# Patient Record
Sex: MALE | Race: WHITE | Employment: OTHER | URBAN - METROPOLITAN AREA
[De-identification: names, ages, dates, MRNs, and addresses within clinical notes are randomized per-mention and may not be internally consistent; named-entity substitution may affect disease eponyms.]

---

## 2017-04-14 RX ORDER — ALPRAZOLAM 0.5 MG/1
0.5 TABLET ORAL DAILY PRN
Qty: 90 TABLET | Refills: 0 | Status: SHIPPED | OUTPATIENT
Start: 2017-04-14 | End: 2017-04-19 | Stop reason: SDUPTHER

## 2017-04-19 RX ORDER — ALPRAZOLAM 0.5 MG/1
0.5 TABLET ORAL DAILY PRN
Qty: 90 TABLET | Refills: 0 | Status: SHIPPED | OUTPATIENT
Start: 2017-04-19 | End: 2017-07-11 | Stop reason: SDUPTHER

## 2017-07-11 RX ORDER — ALPRAZOLAM 0.5 MG/1
0.5 TABLET ORAL DAILY PRN
Qty: 90 TABLET | Refills: 0 | Status: SHIPPED | OUTPATIENT
Start: 2017-07-11 | End: 2017-10-13 | Stop reason: SDUPTHER

## 2017-07-12 ENCOUNTER — NURSE ONLY (OUTPATIENT)
Dept: INTERNAL MEDICINE | Age: 67
End: 2017-07-12

## 2017-07-12 DIAGNOSIS — Z79.899 ENCOUNTER FOR LONG-TERM CURRENT USE OF MEDICATION: Primary | ICD-10-CM

## 2017-07-12 LAB
AMPHETAMINE SCREEN, URINE: NORMAL
BARBITURATE SCREEN URINE: NORMAL
BENZODIAZEPINE SCREEN, URINE: NORMAL
CANNABINOID SCREEN URINE: NORMAL
COCAINE METABOLITE SCREEN URINE: NORMAL
Lab: NORMAL
OPIATE SCREEN URINE: NORMAL
PHENCYCLIDINE SCREEN URINE: NORMAL

## 2017-10-13 ENCOUNTER — OFFICE VISIT (OUTPATIENT)
Dept: INTERNAL MEDICINE | Age: 67
End: 2017-10-13

## 2017-10-13 VITALS
HEIGHT: 66 IN | RESPIRATION RATE: 16 BRPM | OXYGEN SATURATION: 98 % | SYSTOLIC BLOOD PRESSURE: 126 MMHG | HEART RATE: 68 BPM | WEIGHT: 202 LBS | TEMPERATURE: 97.9 F | DIASTOLIC BLOOD PRESSURE: 72 MMHG | BODY MASS INDEX: 32.47 KG/M2

## 2017-10-13 DIAGNOSIS — F41.9 ANXIETY: ICD-10-CM

## 2017-10-13 DIAGNOSIS — F51.04 PSYCHOPHYSIOLOGICAL INSOMNIA: ICD-10-CM

## 2017-10-13 DIAGNOSIS — Z23 NEED FOR INFLUENZA VACCINATION: Primary | ICD-10-CM

## 2017-10-13 PROCEDURE — 99213 OFFICE O/P EST LOW 20 MIN: CPT | Performed by: PHYSICIAN ASSISTANT

## 2017-10-13 PROCEDURE — 90662 IIV NO PRSV INCREASED AG IM: CPT | Performed by: PHYSICIAN ASSISTANT

## 2017-10-13 PROCEDURE — 90471 IMMUNIZATION ADMIN: CPT | Performed by: PHYSICIAN ASSISTANT

## 2017-10-13 RX ORDER — ALPRAZOLAM 0.5 MG/1
0.5 TABLET ORAL DAILY PRN
Qty: 83 TABLET | Refills: 0 | Status: SHIPPED | OUTPATIENT
Start: 2017-10-13 | End: 2018-01-09 | Stop reason: SDUPTHER

## 2017-10-13 RX ORDER — ALPRAZOLAM 0.5 MG/1
0.5 TABLET ORAL DAILY PRN
Qty: 7 TABLET | Refills: 0 | Status: SHIPPED | OUTPATIENT
Start: 2017-10-13 | End: 2018-04-09

## 2017-10-13 ASSESSMENT — ENCOUNTER SYMPTOMS
ABDOMINAL PAIN: 0
NAUSEA: 0
DOUBLE VISION: 0
VOMITING: 0
SHORTNESS OF BREATH: 0
DIARRHEA: 0
EYE PAIN: 0
BACK PAIN: 0
CONSTIPATION: 0
SORE THROAT: 0
COUGH: 0

## 2017-10-13 ASSESSMENT — PATIENT HEALTH QUESTIONNAIRE - PHQ9
1. LITTLE INTEREST OR PLEASURE IN DOING THINGS: 0
SUM OF ALL RESPONSES TO PHQ9 QUESTIONS 1 & 2: 0
SUM OF ALL RESPONSES TO PHQ QUESTIONS 1-9: 0
2. FEELING DOWN, DEPRESSED OR HOPELESS: 0

## 2017-10-13 NOTE — PROGRESS NOTES
Years of education: N/A     Occupational History    Not on file. Social History Main Topics    Smoking status: Former Smoker     Packs/day: 0.50     Years: 3.00     Types: Cigarettes     Quit date: 1/6/2012    Smokeless tobacco: Never Used    Alcohol use Yes      Comment: OCC.  Drug use: Unknown    Sexual activity: Not Currently     Other Topics Concern    Not on file     Social History Narrative    No narrative on file     Family History   Problem Relation Age of Onset    Coronary Art Dis Mother     Heart Failure Mother     Heart Disease Mother     Heart Surgery Sister     Cancer Sister      breast    Heart Disease Sister     Heart Disease Father     Heart Disease Brother      No Known Allergies  Current Outpatient Prescriptions   Medication Sig Dispense Refill    ALPRAZolam (XANAX) 0.5 MG tablet Take 1 tablet by mouth daily as needed for Anxiety 83 tablet 0    ALPRAZolam (XANAX) 0.5 MG tablet Take 1 tablet by mouth daily as needed for Anxiety 7 tablet 0    carvedilol (COREG) 25 MG tablet Take 1 tablet by mouth 2 times daily 90 tablet 0    lisinopril (PRINIVIL;ZESTRIL) 20 MG tablet Take 1 tablet by mouth 2 times daily. 180 tablet 3    atorvastatin (LIPITOR) 80 MG tablet Take 1 tablet by mouth daily. 90 tablet 3    aspirin 81 MG EC tablet Take 81 mg by mouth daily. No current facility-administered medications for this visit. Objective    Vitals:    10/13/17 1527   BP: 126/72   Site: Left Arm   Position: Sitting   Cuff Size: Large Adult   Pulse: 68   Resp: 16   Temp: 97.9 °F (36.6 °C)   TempSrc: Oral   SpO2: 98%   Weight: 202 lb (91.6 kg)   Height: 5' 6\" (1.676 m)     Physical Exam   Constitutional: He is oriented to person, place, and time and well-developed, well-nourished, and in no distress. HENT:   Head: Normocephalic and atraumatic.    Right Ear: External ear normal.   Left Ear: External ear normal.   Nose: Nose normal.   Mouth/Throat: Oropharynx is clear and moist. Eyes: Conjunctivae and EOM are normal. Pupils are equal, round, and reactive to light. Neck: Normal range of motion. Neck supple. Cardiovascular: Normal rate, regular rhythm and normal heart sounds. Pulmonary/Chest: Effort normal and breath sounds normal.   Neurological: He is alert and oriented to person, place, and time. Skin: Skin is warm. Psychiatric: Affect normal.            Assessment & Plan   1. Need for influenza vaccination  INFLUENZA, HIGH DOSE, 65 YRS +, IM, PF, PREFILL SYR, 0.5ML (FLUZONE HD)   2. Anxiety     3. Psychophysiological insomnia           Orders Placed This Encounter   Procedures    INFLUENZA, HIGH DOSE, 65 YRS +, IM, PF, PREFILL SYR, 0.5ML (FLUZONE HD)     Orders Placed This Encounter   Medications    ALPRAZolam (XANAX) 0.5 MG tablet     Sig: Take 1 tablet by mouth daily as needed for Anxiety     Dispense:  83 tablet     Refill:  0    ALPRAZolam (XANAX) 0.5 MG tablet     Sig: Take 1 tablet by mouth daily as needed for Anxiety     Dispense:  7 tablet     Refill:  0     Medications Discontinued During This Encounter   Medication Reason    triamcinolone (KENALOG) 0.025 % cream Therapy completed    ALPRAZolam (XANAX) 0.5 MG tablet Reorder     Return in about 3 months (around 1/13/2018), or if symptoms worsen or fail to improve, for follow up with your PCP as directed.   He should again informed of risk of dependency with this medication  Patient does not show sign of dependency  Desiree Nielsen PA-C

## 2018-01-10 RX ORDER — ALPRAZOLAM 0.5 MG/1
0.5 TABLET ORAL DAILY PRN
Qty: 90 TABLET | Refills: 0 | Status: SHIPPED | OUTPATIENT
Start: 2018-01-10 | End: 2018-04-09 | Stop reason: SDUPTHER

## 2018-01-12 RX ORDER — ALPRAZOLAM 0.5 MG/1
0.5 TABLET ORAL DAILY PRN
Qty: 7 TABLET | Refills: 0 | Status: CANCELLED | OUTPATIENT
Start: 2018-01-12

## 2018-04-09 DIAGNOSIS — F41.9 ANXIETY: Primary | ICD-10-CM

## 2018-04-09 RX ORDER — ALPRAZOLAM 0.5 MG/1
0.5 TABLET ORAL DAILY PRN
Qty: 90 TABLET | Refills: 0 | Status: SHIPPED | OUTPATIENT
Start: 2018-04-09 | End: 2018-07-06 | Stop reason: SDUPTHER

## 2018-05-02 ENCOUNTER — TELEPHONE (OUTPATIENT)
Dept: INTERNAL MEDICINE CLINIC | Age: 68
End: 2018-05-02

## 2018-05-03 ENCOUNTER — TELEPHONE (OUTPATIENT)
Dept: INTERNAL MEDICINE CLINIC | Age: 68
End: 2018-05-03

## 2018-07-06 DIAGNOSIS — F41.9 ANXIETY: ICD-10-CM

## 2018-07-06 RX ORDER — ALPRAZOLAM 0.5 MG/1
0.5 TABLET ORAL DAILY PRN
Qty: 30 TABLET | Refills: 0 | Status: SHIPPED | OUTPATIENT
Start: 2018-07-06 | End: 2018-07-31 | Stop reason: SDUPTHER

## 2018-07-06 NOTE — TELEPHONE ENCOUNTER
Pt requesting medication refill. Rx requested:  Requested Prescriptions     Pending Prescriptions Disp Refills    ALPRAZolam (XANAX) 0.5 MG tablet 90 tablet 0     Sig: Take 1 tablet by mouth daily as needed for Anxiety for up to 90 days. .       Last Office Visit:   Visit date not found    Last Tox screen:  7.12.17    Last Medication contract:    8.1.17    Next Visit Date:  Future Appointments  Date Time Provider Cirilo Lozano   7/31/2018 4:15 PM MD Law Engle 39

## 2018-07-31 ENCOUNTER — OFFICE VISIT (OUTPATIENT)
Dept: INTERNAL MEDICINE CLINIC | Age: 68
End: 2018-07-31
Payer: COMMERCIAL

## 2018-07-31 VITALS
TEMPERATURE: 97.6 F | DIASTOLIC BLOOD PRESSURE: 90 MMHG | HEIGHT: 67 IN | SYSTOLIC BLOOD PRESSURE: 154 MMHG | WEIGHT: 213 LBS | HEART RATE: 70 BPM | RESPIRATION RATE: 16 BRPM | BODY MASS INDEX: 33.43 KG/M2 | OXYGEN SATURATION: 98 %

## 2018-07-31 DIAGNOSIS — Z23 NEED FOR SHINGLES VACCINE: ICD-10-CM

## 2018-07-31 DIAGNOSIS — Z23 NEED FOR VACCINATION WITH 13-POLYVALENT PNEUMOCOCCAL CONJUGATE VACCINE: ICD-10-CM

## 2018-07-31 DIAGNOSIS — G56.01 RIGHT CARPAL TUNNEL SYNDROME: ICD-10-CM

## 2018-07-31 DIAGNOSIS — Z12.5 SCREENING FOR PROSTATE CANCER: ICD-10-CM

## 2018-07-31 DIAGNOSIS — Z79.899 ENCOUNTER FOR LONG-TERM CURRENT USE OF MEDICATION: ICD-10-CM

## 2018-07-31 DIAGNOSIS — F41.9 ANXIETY: ICD-10-CM

## 2018-07-31 DIAGNOSIS — Z23 NEED FOR TDAP VACCINATION: ICD-10-CM

## 2018-07-31 DIAGNOSIS — I10 ESSENTIAL HYPERTENSION: Primary | ICD-10-CM

## 2018-07-31 DIAGNOSIS — I83.93 VARICOSE VEINS OF BOTH LOWER EXTREMITIES: ICD-10-CM

## 2018-07-31 PROCEDURE — 90715 TDAP VACCINE 7 YRS/> IM: CPT | Performed by: FAMILY MEDICINE

## 2018-07-31 PROCEDURE — 99214 OFFICE O/P EST MOD 30 MIN: CPT | Performed by: FAMILY MEDICINE

## 2018-07-31 PROCEDURE — 90472 IMMUNIZATION ADMIN EACH ADD: CPT | Performed by: FAMILY MEDICINE

## 2018-07-31 PROCEDURE — 90670 PCV13 VACCINE IM: CPT | Performed by: FAMILY MEDICINE

## 2018-07-31 PROCEDURE — 90471 IMMUNIZATION ADMIN: CPT | Performed by: FAMILY MEDICINE

## 2018-07-31 RX ORDER — ALPRAZOLAM 0.5 MG/1
0.5 TABLET ORAL DAILY PRN
Qty: 90 TABLET | Refills: 1 | Status: SHIPPED | OUTPATIENT
Start: 2018-07-31 | End: 2019-01-28

## 2018-07-31 RX ORDER — ALPRAZOLAM 0.5 MG/1
0.5 TABLET ORAL DAILY PRN
Qty: 30 TABLET | Refills: 0 | Status: SHIPPED | OUTPATIENT
Start: 2018-07-31 | End: 2018-07-31 | Stop reason: SDUPTHER

## 2018-07-31 ASSESSMENT — PATIENT HEALTH QUESTIONNAIRE - PHQ9
2. FEELING DOWN, DEPRESSED OR HOPELESS: 0
SUM OF ALL RESPONSES TO PHQ QUESTIONS 1-9: 0
SUM OF ALL RESPONSES TO PHQ9 QUESTIONS 1 & 2: 0
1. LITTLE INTEREST OR PLEASURE IN DOING THINGS: 0

## 2018-07-31 NOTE — PATIENT INSTRUCTIONS
sugar-sweetened drinks like soda. The Mediterranean diet may also include red wine with your meal-1 glass each day for women and up to 2 glasses a day for men. Tips for eating at home  · Use herbs, spices, garlic, lemon zest, and citrus juice instead of salt to add flavor to foods. · Add avocado slices to your sandwich instead of wilde. · Have fish for lunch or dinner instead of red meat. Brush the fish with olive oil, and broil or grill it. · Sprinkle your salad with seeds or nuts instead of cheese. · Cook with olive or canola oil instead of butter or oils that are high in saturated fat. · Switch from 2% milk or whole milk to 1% or fat-free milk. · Dip raw vegetables in a vinaigrette dressing or hummus instead of dips made from mayonnaise or sour cream.  · Have a piece of fruit for dessert instead of a piece of cake. Try baked apples, or have some dried fruit. Tips for eating out  · Try broiled, grilled, baked, or poached fish instead of having it fried or breaded. · Ask your  to have your meals prepared with olive oil instead of butter. · Order dishes made with marinara sauce or sauces made from olive oil. Avoid sauces made from cream or mayonnaise. · Choose whole-grain breads, whole wheat pasta and pizza crust, brown rice, beans, and lentils. · Cut back on butter or margarine on bread. Instead, you can dip your bread in a small amount of olive oil. · Ask for a side salad or grilled vegetables instead of french fries or chips. Where can you learn more? Go to https://[employer]okeweb.[employer]Filmaster. org and sign in to your better. account. Enter [phone] in the [facility] box to learn more about \"Learning About the Mediterranean Diet. \"     If you do not have an account, please click on the \"Sign Up Now\" link. Current as of: May 12, 2017  Content Version: 11.6  © 6383-9783 [employer]. Care instructions adapted under license by [facility] ([facility]).  If you have questions about a

## 2018-07-31 NOTE — PROGRESS NOTES
drainage. Respiratory: Negative for cough, dyspnea and wheezing. Cardiovascular:  Negative for chest pain, claudication and irregular heartbeat/palpitations. Gastrointestinal: Negative for abdominal pain, nausea, vomiting, constipation and diarrhea. Genitourinary: No dysuria or penile discharge. Metabolic/Endocrine: Negative for cold intolerance, heat intolerance, polydipsia and polyphagia. No unintended weight loss or gain. Neuro/Psychiatric: Negative for gait disturbance. Negative for psychiatric symptoms. Dermatologic: Negative for pruritus and positive rash. Musculoskeletal: positive for bone/joint symptoms. No numbness or tingling. No weakness. Hematology: Negative for bleeding and easy bruising. Immunology:  Negative for environmental allergies and food allergies. Physical Exam    Patient's medication, allergies, past medical, surgical, social and family histories were reviewed and updated as appropriate. PHYSICAL EXAM   General Appearance:  Alert oriented pleasant cooperative in no acute distress. HEENT: Eyes clear nonicteric facial muscles symmetrical.  Color good  Neck: Soft nontender no adenopathy no carotid bruits  Lungs: Clear to auscultation no wheezes rhonchi or rales  Heart: Regular rate and rhythm without murmurs rubs or gallops  Extremities: He has 1+ pitting edema right above his ankles bilaterally. No calf tenderness no erythema. Assessment:   Diagnosis Orders   1. Essential hypertension  He needs his medication adjusted but he'll be seeing cardiology review prefers to them to attend to that. Talked to him about weight loss as I really feel that his diet is getting out of control and that his weight is going up because she went is eating that's causing his blood pressure to go up and certainly is adding to the swelling in his ankles.   We'll see him back to follow-up on weight loss to see if we can make any progress gave him some information today on the Mediterranean diet.     2. Anxiety  ALPRAZolam (XANAX) 0.5 MG tablet    DISCONTINUED: ALPRAZolam (XANAX) 0.5 MG tablet   3. Encounter for long-term current use of medication  Urine Drug Screen   4. Need for vaccination with 13-polyvalent pneumococcal conjugate vaccine  Pneumococcal conjugate vaccine 13-valent   5. Need for Tdap vaccination  Tdap (age 10y-63y) IM (ADACEL)   10. Need for shingles vaccine  zoster recombinant adjuvanted vaccine (SHINGRIX) 50 MCG SUSR injection   7. Right carpal tunnel syndrome  External Referral - Madelaine Vitale MD - Arthroscopy, Sports Med   8. Varicose veins of both lower extremities     9. Screening for prostate cancer  Psa screening               Plan:  Current Outpatient Prescriptions   Medication Sig Dispense Refill    zoster recombinant adjuvanted vaccine (SHINGRIX) 50 MCG SUSR injection Inject 0.5 mLs into the muscle once for 1 dose 0.5 mL 1    ALPRAZolam (XANAX) 0.5 MG tablet Take 1 tablet by mouth daily as needed for Anxiety for up to 181 days. . 90 tablet 1    carvedilol (COREG) 25 MG tablet Take 1 tablet by mouth 2 times daily 90 tablet 0    lisinopril (PRINIVIL;ZESTRIL) 20 MG tablet Take 1 tablet by mouth 2 times daily. 180 tablet 3    atorvastatin (LIPITOR) 80 MG tablet Take 1 tablet by mouth daily. 90 tablet 3    aspirin 81 MG EC tablet Take 81 mg by mouth daily. No current facility-administered medications for this visit.       Orders Placed This Encounter   Procedures    Pneumococcal conjugate vaccine 13-valent    Tdap (age 10y-63y) IM (ADACEL)    Urine Drug Screen     Standing Status:   Future     Standing Expiration Date:   7/31/2019    Psa screening     Standing Status:   Future     Standing Expiration Date:   7/31/2019    External Referral - Madelaine Vitale MD - Arthroscopy, Sports Med     Referral Priority:   Routine     Referral Type:   Eval and Treat     Referral Reason:   Specialty Services Required     Referred to Provider:   José Gross MD

## 2018-07-31 NOTE — LETTER
disease. Overdose or dangerous interactions with alcohol and other medications may occur, leading to death. Hyperalgesia may develop, in which patients receiving opioids for the treatment of pain may actually become more sensitive to certain painful stimuli, and in some cases, experience pain from ordinarily non-painful stimuli. Women between the ages of 14-53 who could become pregnant should carefully weigh the risks and benefits of opioids with their physicians, as these medications increase the risk of pregnancy complications, including miscarriage,  delivery and stillbirth. It is also possible for babies to be born addicted to opioids. Opioid dependence withdrawal symptoms may include; feelings of uneasiness, increased pain, irritability, belly pain, diarrhea, sweats and goose-flesh. Benzodiazepines and non-benzodiazepine sleep medications: These medications can lead to problems such as addiction/dependence, sedation, fatigue, lightheadedness, dizziness, incoordination, falls, depression, hallucinations, and impaired judgment, memory and concentration. The ability to drive and operate machinery may also be affected. Abnormal sleep-related behaviors have been reported, including sleep walking, driving, making telephone calls, eating, or having sex while not fully awake. These medications can suppress breathing and worsen sleep apnea, particularly when combined with alcohol or other sedating medications, potentially leading to death. Dependence withdrawal symptoms may include tremors, anxiety, hallucinations and seizures. Stimulants:  Common adverse effects include addiction/dependence, increased blood pressure and heart rate, decreased appetite, nausea, involuntary weight loss, insomnia, irritability, and headaches.   These risks may increase when these medications are combined with other stimulants, such as caffeine pills or energy drinks, certain weight loss supplements and oral decongestants. Dependence withdrawal symptoms may include depressed mood, loss of interest, suicidal thoughts, anxiety, fatigue, appetite changes and agitation. Testosterone replacement therapy:  Potential side effects include increased risk of stroke and heart attack, blood clots, increased blood pressure, increased cholesterol, enlarged prostate, sleep apnea, irritability/aggression and other mood disorders, and decreased fertility. Other:     1. I understand that I have the following responsibilities:  · I will take medications at the dose and frequency prescribed. · I will not increase or change how I take my medications without the approval of the health care provider who signs this Medication Agreement. · I will arrange for refills at the prescribed interval ONLY during regular office hours. I will not ask for refills earlier than agreed, after-hours, on holidays or on weekends. · I will obtain all refills for these medications at  ·  ____________________________________  pharmacy (phone number  ·  ________________________), with full consent for my provider and pharmacist to exchange information in writing or verbally. · I will not request any pain medications or controlled substances from other providers and will inform this provider of all other medications I am taking. · I will inform my other health care providers that I am taking these medications and of the existence of this [address]. In the event of an emergency, I will provide the same information to the emergency department providers. · I will protect my prescriptions and medications. I understand that lost or misplaced prescriptions will not be replaced. · I will keep medications only for my own use and will not share them with others. I will keep all medications away from children. · I agree to participate in any medical, psychological or psychiatric assessments recommended by my provider. · I will actively participate in any program designed to improve function, including social, physical, psychological and daily or work activities. 2. I will not use illegal or street drugs or another person's prescription. If I have an addiction problem with drugs or alcohol and my provider asks me to enter a program to address this issue, I agree to follow through. Such programs may include:  · 12-Step program and securing a sponsor  · Individual counseling   · Inpatient or outpatient treatment  · Other:_____________________________________________________________________________________________________________________________________________    If in treatment, I will request that a copy of the programs initial evaluation and treatment recommendations be sent to this provider and will not expect refills until that is received. I will also request written monthly updates be sent to this provider to verify my continuing treatment. 3. I will consent to drug screening upon my providers request to assure I am only taking the prescribed drugs, described in this MEDICATION AGREEMENT. I understand that a drug screen is a laboratory test in which a sample of my urine, blood or saliva is checked to see what drugs I have been taking. 4. I agree that I will treat the providers and staff at this office with respect at all times. I will keep all of my scheduled appointments, but if I need to cancel my appointment, I will do so a minimum of 24 hours before it is scheduled. 5. I understand that this provider may stop prescribing the medications listed if:  · I do not show any improvement in pain, or my activity has not improved. · I develop rapid tolerance or loss of improvement, as described in my treatment plan. · I develop significant side effects from the medication.   · My behavior is inconsistent with the responsibilities outlined above, which may also result in my being prevented from receiving further care from this office. · Other:____________________________________________________________________    AGREEMENT:    I have read the above and have had all of my questions answered. For chronic disease management, I know that my symptoms can be managed with many types of treatments. A chronic medication trial may be part of my treatment, but I must be an active participant in my care. Medication therapy is only one part of my symptom management plan. In some cases, there may be limited scientific evidence to support the chronic use of certain medications to improve symptoms and daily function. Furthermore, in certain circumstances, there may be scientific information that suggests that use of chronic controlled substances may actually worsen my symptoms and increase my risk of unintentional death directly related to this medication therapy. I know that if my provider feels my risk from controlled medications is greater than my benefit, I will have my controlled substance medication(s) compassionately lowered or removed altogether. I agree to a controlled substance medication trial.      I further agree to allow this office to contact family or friends if there are concerns about my safety and use of the controlled medications. I have agreed to use the following medications above as instructed by my physician and as stated in this [address].      Patient Signature:  ______________________  Date:7/31/2018 or _____________    Provider Signature:______________________  Date:7/31/2018 or _____________

## 2018-08-01 LAB
AMPHETAMINE SCREEN, URINE: ABNORMAL
BARBITURATE SCREEN URINE: ABNORMAL
BENZODIAZEPINE SCREEN, URINE: POSITIVE
CANNABINOID SCREEN URINE: ABNORMAL
COCAINE METABOLITE SCREEN URINE: ABNORMAL
Lab: ABNORMAL
OPIATE SCREEN URINE: ABNORMAL
PHENCYCLIDINE SCREEN URINE: ABNORMAL

## 2018-08-23 ENCOUNTER — OFFICE VISIT (OUTPATIENT)
Dept: INTERNAL MEDICINE CLINIC | Age: 68
End: 2018-08-23
Payer: COMMERCIAL

## 2018-08-23 VITALS
HEART RATE: 76 BPM | OXYGEN SATURATION: 98 % | SYSTOLIC BLOOD PRESSURE: 130 MMHG | DIASTOLIC BLOOD PRESSURE: 80 MMHG | WEIGHT: 211 LBS | RESPIRATION RATE: 17 BRPM | BODY MASS INDEX: 33.05 KG/M2 | TEMPERATURE: 98.3 F

## 2018-08-23 DIAGNOSIS — G47.33 OBSTRUCTIVE SLEEP APNEA SYNDROME: Primary | ICD-10-CM

## 2018-08-23 DIAGNOSIS — L30.1 DYSHIDROTIC ECZEMA: ICD-10-CM

## 2018-08-23 PROCEDURE — 1123F ACP DISCUSS/DSCN MKR DOCD: CPT | Performed by: FAMILY MEDICINE

## 2018-08-23 PROCEDURE — 4040F PNEUMOC VAC/ADMIN/RCVD: CPT | Performed by: FAMILY MEDICINE

## 2018-08-23 PROCEDURE — G8427 DOCREV CUR MEDS BY ELIG CLIN: HCPCS | Performed by: FAMILY MEDICINE

## 2018-08-23 PROCEDURE — 99213 OFFICE O/P EST LOW 20 MIN: CPT | Performed by: FAMILY MEDICINE

## 2018-08-23 PROCEDURE — 3017F COLORECTAL CA SCREEN DOC REV: CPT | Performed by: FAMILY MEDICINE

## 2018-08-23 PROCEDURE — 1036F TOBACCO NON-USER: CPT | Performed by: FAMILY MEDICINE

## 2018-08-23 PROCEDURE — 1101F PT FALLS ASSESS-DOCD LE1/YR: CPT | Performed by: FAMILY MEDICINE

## 2018-08-23 PROCEDURE — G8598 ASA/ANTIPLAT THER USED: HCPCS | Performed by: FAMILY MEDICINE

## 2018-08-23 PROCEDURE — G8417 CALC BMI ABV UP PARAM F/U: HCPCS | Performed by: FAMILY MEDICINE

## 2018-08-23 RX ORDER — BETAMETHASONE DIPROPIONATE 0.05 %
OINTMENT (GRAM) TOPICAL
Qty: 50 G | Refills: 1 | Status: SHIPPED | OUTPATIENT
Start: 2018-08-23 | End: 2018-10-08 | Stop reason: SDUPTHER

## 2018-08-23 NOTE — PROGRESS NOTES
claudication and irregular heartbeat/palpitations. Gastrointestinal: Negative for abdominal pain, nausea, vomiting, constipation and diarrhea. Genitourinary: No dysuria or penile discharge. Metabolic/Endocrine: Negative for cold intolerance, heat intolerance, polydipsia and polyphagia. No unintended weight loss or gain. Neuro/Psychiatric: Negative for gait disturbance. Negative for psychiatric symptoms. Dermatologic: Positive  for pruritus and rash. Musculoskeletal:  Positive  for bone/joint symptoms. No numbness or tingling. No weakness. Hematology: Negative for bleeding and easy bruising. Immunology:  Negative for environmental allergies and food allergies. Physical Exam    Patient's medication, allergies, past medical, surgical, social and family histories were reviewed and updated as appropriate. PHYSICAL EXAM   General appearance: Alert oriented pleasant cooperative in no acute distress. Lungs: Clear to auscultation no wheezes rhonchi or rales  Heart: Regular rate and rhythm without murmurs rubs or gallops [place] extremities: No edema neurologically grossly intact. Fingers and between his fingers especially in his right hand he has scaly slightly erythematous pattern of skin irritation in her in the inner digits. On the pads of her dorsum of the hand. No bulla    Assessment:   Diagnosis Orders   1. Obstructive sleep apnea syndrome  CPAP supplies ordered he has a seat working CPAP machine at this time. 2. Dyshidrotic eczema  betamethasone dipropionate (DIPROLENE) 0.05 % ointment       His blood pressure is normal he drink less coffee before he came in.        Plan:  Current Outpatient Prescriptions   Medication Sig Dispense Refill    betamethasone dipropionate (DIPROLENE) 0.05 % ointment Apply topically before bed time and use PRN after it resolves 50 g 1    ALPRAZolam (XANAX) 0.5 MG tablet Take 1 tablet by mouth daily as needed for Anxiety for up to 181 days. . 90 tablet 1    carvedilol

## 2018-10-05 ENCOUNTER — HOSPITAL ENCOUNTER (OUTPATIENT)
Dept: GENERAL RADIOLOGY | Age: 68
Discharge: HOME | End: 2018-10-07
Payer: COMMERCIAL

## 2018-10-05 DIAGNOSIS — M79.641 HAND PAIN, RIGHT: ICD-10-CM

## 2018-10-05 PROCEDURE — 73130 X-RAY EXAM OF HAND: CPT

## 2018-10-08 ENCOUNTER — OFFICE VISIT (OUTPATIENT)
Dept: INTERNAL MEDICINE CLINIC | Age: 68
End: 2018-10-08
Payer: COMMERCIAL

## 2018-10-08 VITALS
WEIGHT: 216.6 LBS | RESPIRATION RATE: 16 BRPM | DIASTOLIC BLOOD PRESSURE: 78 MMHG | HEART RATE: 82 BPM | HEIGHT: 67 IN | OXYGEN SATURATION: 98 % | SYSTOLIC BLOOD PRESSURE: 164 MMHG | TEMPERATURE: 98.1 F | BODY MASS INDEX: 34 KG/M2

## 2018-10-08 DIAGNOSIS — G56.01 RIGHT CARPAL TUNNEL SYNDROME: ICD-10-CM

## 2018-10-08 DIAGNOSIS — I25.810 CORONARY ARTERY DISEASE INVOLVING CORONARY BYPASS GRAFT OF NATIVE HEART WITHOUT ANGINA PECTORIS: ICD-10-CM

## 2018-10-08 DIAGNOSIS — L30.1 DYSHIDROTIC ECZEMA: ICD-10-CM

## 2018-10-08 DIAGNOSIS — I10 ESSENTIAL HYPERTENSION: Primary | ICD-10-CM

## 2018-10-08 PROCEDURE — G8598 ASA/ANTIPLAT THER USED: HCPCS | Performed by: FAMILY MEDICINE

## 2018-10-08 PROCEDURE — 99213 OFFICE O/P EST LOW 20 MIN: CPT | Performed by: FAMILY MEDICINE

## 2018-10-08 PROCEDURE — 1123F ACP DISCUSS/DSCN MKR DOCD: CPT | Performed by: FAMILY MEDICINE

## 2018-10-08 PROCEDURE — 3017F COLORECTAL CA SCREEN DOC REV: CPT | Performed by: FAMILY MEDICINE

## 2018-10-08 PROCEDURE — G8427 DOCREV CUR MEDS BY ELIG CLIN: HCPCS | Performed by: FAMILY MEDICINE

## 2018-10-08 PROCEDURE — 4040F PNEUMOC VAC/ADMIN/RCVD: CPT | Performed by: FAMILY MEDICINE

## 2018-10-08 PROCEDURE — 1036F TOBACCO NON-USER: CPT | Performed by: FAMILY MEDICINE

## 2018-10-08 PROCEDURE — G8417 CALC BMI ABV UP PARAM F/U: HCPCS | Performed by: FAMILY MEDICINE

## 2018-10-08 PROCEDURE — G8482 FLU IMMUNIZE ORDER/ADMIN: HCPCS | Performed by: FAMILY MEDICINE

## 2018-10-08 PROCEDURE — 1101F PT FALLS ASSESS-DOCD LE1/YR: CPT | Performed by: FAMILY MEDICINE

## 2018-10-08 RX ORDER — METOPROLOL SUCCINATE 50 MG/1
50 TABLET, EXTENDED RELEASE ORAL DAILY
Qty: 30 TABLET | Refills: 5 | Status: SHIPPED | OUTPATIENT
Start: 2018-10-08 | End: 2019-04-15 | Stop reason: SDUPTHER

## 2018-10-08 RX ORDER — BETAMETHASONE DIPROPIONATE 0.05 %
OINTMENT (GRAM) TOPICAL
Qty: 50 G | Refills: 1 | Status: SHIPPED | OUTPATIENT
Start: 2018-10-08 | End: 2019-05-08 | Stop reason: ALTCHOICE

## 2018-10-08 NOTE — PROGRESS NOTES
claudication and irregular heartbeat/palpitations. Gastrointestinal: Negative for abdominal pain, nausea, vomiting, constipation and diarrhea. Genitourinary: No dysuria or penile discharge. Metabolic/Endocrine: Negative for cold intolerance, heat intolerance, polydipsia and polyphagia. No unintended weight loss or gain. Neuro/Psychiatric: Negative for gait disturbance. Negative for psychiatric symptoms. Dermatologic: Negative for pruritus and rash. Musculoskeletal: Negative for bone/joint symptoms. No numbness or tingling. No weakness. Hematology: Negative for bleeding and easy bruising. Immunology:  Negative for environmental allergies and food allergies. Physical Exam    Patient's medication, allergies, past medical, surgical, social and family histories were reviewed and updated as appropriate. PHYSICAL EXAM   General appearance: Alert oriented pleasant cooperative in no acute distress. HEENT: Eyes clear nonicteric color good comprehension excellent  Lungs: Clear to auscultation no wheezes rhonchi or rales  Heart: Regular rate and rhythm without murmurs rubs or gallops  Neck: Soft nontender no adenopathy no carotid bruits  Extremities: No rashes or edema neurologically intact right thenar eminence is not atrophied. He moves all his extremities well and normally. Assessment:   Diagnosis Orders   1. Essential hypertension  Comprehensive Metabolic Panel  Add low-dose metoprolol to what he's currently taking and see him back in a week to assess if it improves his blood pressure. 2. Coronary artery disease involving coronary bypass graft of native heart without angina pectoris     3. Dyshidrotic eczema  betamethasone dipropionate (DIPROLENE) 0.05 % ointment   4. Right carpal tunnel syndrome  His blood pressure gets under control I think he can be approved for the carpal tunnel release surgery if a block is uses post general anesthesia.   He also continues on the outcome of his presurgical

## 2018-10-17 ENCOUNTER — OFFICE VISIT (OUTPATIENT)
Dept: INTERNAL MEDICINE CLINIC | Age: 68
End: 2018-10-17
Payer: COMMERCIAL

## 2018-10-17 VITALS
RESPIRATION RATE: 16 BRPM | TEMPERATURE: 97.6 F | HEART RATE: 71 BPM | DIASTOLIC BLOOD PRESSURE: 70 MMHG | OXYGEN SATURATION: 98 % | BODY MASS INDEX: 32.68 KG/M2 | SYSTOLIC BLOOD PRESSURE: 126 MMHG | HEIGHT: 67 IN | WEIGHT: 208.2 LBS

## 2018-10-17 DIAGNOSIS — F51.01 PRIMARY INSOMNIA: ICD-10-CM

## 2018-10-17 DIAGNOSIS — I10 ESSENTIAL HYPERTENSION: Primary | ICD-10-CM

## 2018-10-17 PROCEDURE — G8417 CALC BMI ABV UP PARAM F/U: HCPCS | Performed by: FAMILY MEDICINE

## 2018-10-17 PROCEDURE — 1036F TOBACCO NON-USER: CPT | Performed by: FAMILY MEDICINE

## 2018-10-17 PROCEDURE — G8598 ASA/ANTIPLAT THER USED: HCPCS | Performed by: FAMILY MEDICINE

## 2018-10-17 PROCEDURE — 1101F PT FALLS ASSESS-DOCD LE1/YR: CPT | Performed by: FAMILY MEDICINE

## 2018-10-17 PROCEDURE — G8427 DOCREV CUR MEDS BY ELIG CLIN: HCPCS | Performed by: FAMILY MEDICINE

## 2018-10-17 PROCEDURE — G8482 FLU IMMUNIZE ORDER/ADMIN: HCPCS | Performed by: FAMILY MEDICINE

## 2018-10-17 PROCEDURE — 1123F ACP DISCUSS/DSCN MKR DOCD: CPT | Performed by: FAMILY MEDICINE

## 2018-10-17 PROCEDURE — 4040F PNEUMOC VAC/ADMIN/RCVD: CPT | Performed by: FAMILY MEDICINE

## 2018-10-17 PROCEDURE — 99213 OFFICE O/P EST LOW 20 MIN: CPT | Performed by: FAMILY MEDICINE

## 2018-10-17 PROCEDURE — 3017F COLORECTAL CA SCREEN DOC REV: CPT | Performed by: FAMILY MEDICINE

## 2018-10-17 RX ORDER — TRAZODONE HYDROCHLORIDE 50 MG/1
50 TABLET ORAL NIGHTLY
Qty: 30 TABLET | Refills: 5 | Status: SHIPPED | OUTPATIENT
Start: 2018-10-17 | End: 2019-01-17 | Stop reason: SDUPTHER

## 2018-10-17 NOTE — PROGRESS NOTES
orders of the defined types were placed in this encounter. Orders Placed This Encounter   Medications    traZODone (DESYREL) 50 MG tablet     Sig: Take 1 tablet by mouth nightly     Dispense:  30 tablet     Refill:  5              Return in about 3 months (around 1/17/2019).     Dr. Andrew Piña      10/17/18  3:51 PM

## 2018-10-22 ENCOUNTER — HOSPITAL ENCOUNTER (OUTPATIENT)
Dept: PREADMISSION TESTING | Age: 68
Discharge: HOME | End: 2018-10-26
Payer: COMMERCIAL

## 2018-10-22 VITALS
RESPIRATION RATE: 16 BRPM | BODY MASS INDEX: 32.65 KG/M2 | DIASTOLIC BLOOD PRESSURE: 85 MMHG | TEMPERATURE: 97.2 F | OXYGEN SATURATION: 98 % | WEIGHT: 208 LBS | HEART RATE: 67 BPM | SYSTOLIC BLOOD PRESSURE: 155 MMHG | HEIGHT: 67 IN

## 2018-10-22 DIAGNOSIS — G56.01 CARPAL TUNNEL SYNDROME OF RIGHT WRIST: ICD-10-CM

## 2018-10-22 PROBLEM — G56.00 CARPAL TUNNEL SYNDROME: Status: ACTIVE | Noted: 2018-10-22

## 2018-10-22 LAB
ALBUMIN SERPL-MCNC: 4.2 G/DL (ref 3.9–4.9)
ALP BLD-CCNC: 51 U/L (ref 35–104)
ALT SERPL-CCNC: 34 U/L (ref 0–41)
ANION GAP SERPL CALCULATED.3IONS-SCNC: 12 MEQ/L (ref 7–13)
AST SERPL-CCNC: 30 U/L (ref 0–40)
BILIRUB SERPL-MCNC: 0.6 MG/DL (ref 0–1.2)
BUN BLDV-MCNC: 15 MG/DL (ref 8–23)
CALCIUM SERPL-MCNC: 9.3 MG/DL (ref 8.6–10.2)
CHLORIDE BLD-SCNC: 102 MEQ/L (ref 98–107)
CO2: 25 MEQ/L (ref 22–29)
CREAT SERPL-MCNC: 0.74 MG/DL (ref 0.7–1.2)
GFR AFRICAN AMERICAN: >60
GFR NON-AFRICAN AMERICAN: >60
GLOBULIN: 2.6 G/DL (ref 2.3–3.5)
GLUCOSE BLD-MCNC: 72 MG/DL (ref 74–109)
HCT VFR BLD CALC: 40.1 % (ref 42–52)
HEMOGLOBIN: 13.8 G/DL (ref 14–18)
MCH RBC QN AUTO: 32.1 PG (ref 27–31.3)
MCHC RBC AUTO-ENTMCNC: 34.4 % (ref 33–37)
MCV RBC AUTO: 93.4 FL (ref 80–100)
PDW BLD-RTO: 12.6 % (ref 11.5–14.5)
PLATELET # BLD: 314 K/UL (ref 130–400)
POTASSIUM SERPL-SCNC: 4.2 MEQ/L (ref 3.5–5.1)
RBC # BLD: 4.3 M/UL (ref 4.7–6.1)
SODIUM BLD-SCNC: 139 MEQ/L (ref 132–144)
TOTAL PROTEIN: 6.8 G/DL (ref 6.4–8.1)
WBC # BLD: 7.1 K/UL (ref 4.8–10.8)

## 2018-10-22 PROCEDURE — 85027 COMPLETE CBC AUTOMATED: CPT

## 2018-10-22 PROCEDURE — 80053 COMPREHEN METABOLIC PANEL: CPT

## 2018-10-22 RX ORDER — LIDOCAINE HYDROCHLORIDE 10 MG/ML
1 INJECTION, SOLUTION EPIDURAL; INFILTRATION; INTRACAUDAL; PERINEURAL
Status: CANCELLED | OUTPATIENT
Start: 2018-10-30 | End: 2018-10-30

## 2018-10-22 RX ORDER — NITROGLYCERIN 0.4 MG/1
0.4 TABLET SUBLINGUAL EVERY 5 MIN PRN
COMMUNITY
End: 2019-01-17 | Stop reason: ALTCHOICE

## 2018-10-22 RX ORDER — SODIUM CHLORIDE 0.9 % (FLUSH) 0.9 %
10 SYRINGE (ML) INJECTION PRN
Status: CANCELLED | OUTPATIENT
Start: 2018-10-30

## 2018-10-22 RX ORDER — SODIUM CHLORIDE, SODIUM LACTATE, POTASSIUM CHLORIDE, CALCIUM CHLORIDE 600; 310; 30; 20 MG/100ML; MG/100ML; MG/100ML; MG/100ML
INJECTION, SOLUTION INTRAVENOUS CONTINUOUS
Status: CANCELLED | OUTPATIENT
Start: 2018-10-30

## 2018-10-22 RX ORDER — SODIUM CHLORIDE 0.9 % (FLUSH) 0.9 %
10 SYRINGE (ML) INJECTION EVERY 12 HOURS SCHEDULED
Status: CANCELLED | OUTPATIENT
Start: 2018-10-30

## 2018-10-22 RX ORDER — CEFAZOLIN SODIUM 2 G/50ML
2 SOLUTION INTRAVENOUS ONCE
Status: CANCELLED | OUTPATIENT
Start: 2018-10-30

## 2018-10-30 ENCOUNTER — ANESTHESIA (OUTPATIENT)
Dept: OPERATING ROOM | Age: 68
End: 2018-10-30
Payer: COMMERCIAL

## 2018-10-30 ENCOUNTER — HOSPITAL ENCOUNTER (OUTPATIENT)
Age: 68
Setting detail: OUTPATIENT SURGERY
Discharge: HOME | End: 2018-10-30
Attending: ORTHOPAEDIC SURGERY | Admitting: ORTHOPAEDIC SURGERY
Payer: COMMERCIAL

## 2018-10-30 ENCOUNTER — ANESTHESIA EVENT (OUTPATIENT)
Dept: OPERATING ROOM | Age: 68
End: 2018-10-30
Payer: COMMERCIAL

## 2018-10-30 VITALS — SYSTOLIC BLOOD PRESSURE: 126 MMHG | DIASTOLIC BLOOD PRESSURE: 61 MMHG | OXYGEN SATURATION: 99 %

## 2018-10-30 VITALS
DIASTOLIC BLOOD PRESSURE: 84 MMHG | SYSTOLIC BLOOD PRESSURE: 166 MMHG | HEART RATE: 64 BPM | OXYGEN SATURATION: 97 % | RESPIRATION RATE: 18 BRPM | TEMPERATURE: 98.1 F

## 2018-10-30 DIAGNOSIS — G56.01 CARPAL TUNNEL SYNDROME OF RIGHT WRIST: Primary | ICD-10-CM

## 2018-10-30 PROCEDURE — 2580000003 HC RX 258: Performed by: ORTHOPAEDIC SURGERY

## 2018-10-30 PROCEDURE — 6360000002 HC RX W HCPCS: Performed by: NURSE ANESTHETIST, CERTIFIED REGISTERED

## 2018-10-30 PROCEDURE — 3700000001 HC ADD 15 MINUTES (ANESTHESIA): Performed by: ORTHOPAEDIC SURGERY

## 2018-10-30 PROCEDURE — 7100000010 HC PHASE II RECOVERY - FIRST 15 MIN: Performed by: ORTHOPAEDIC SURGERY

## 2018-10-30 PROCEDURE — 3700000000 HC ANESTHESIA ATTENDED CARE: Performed by: ORTHOPAEDIC SURGERY

## 2018-10-30 PROCEDURE — 6360000002 HC RX W HCPCS: Performed by: NURSE PRACTITIONER

## 2018-10-30 PROCEDURE — 2500000003 HC RX 250 WO HCPCS: Performed by: ORTHOPAEDIC SURGERY

## 2018-10-30 PROCEDURE — 3600000012 HC SURGERY LEVEL 2 ADDTL 15MIN: Performed by: ORTHOPAEDIC SURGERY

## 2018-10-30 PROCEDURE — 2709999900 HC NON-CHARGEABLE SUPPLY: Performed by: ORTHOPAEDIC SURGERY

## 2018-10-30 PROCEDURE — 7100000011 HC PHASE II RECOVERY - ADDTL 15 MIN: Performed by: ORTHOPAEDIC SURGERY

## 2018-10-30 PROCEDURE — 3600000002 HC SURGERY LEVEL 2 BASE: Performed by: ORTHOPAEDIC SURGERY

## 2018-10-30 PROCEDURE — 2580000003 HC RX 258: Performed by: ANESTHESIOLOGY

## 2018-10-30 RX ORDER — SODIUM CHLORIDE 9 MG/ML
50 INJECTION, SOLUTION INTRAVENOUS CONTINUOUS
Status: DISCONTINUED | OUTPATIENT
Start: 2018-10-30 | End: 2018-10-30 | Stop reason: HOSPADM

## 2018-10-30 RX ORDER — SODIUM CHLORIDE 0.9 % (FLUSH) 0.9 %
10 SYRINGE (ML) INJECTION PRN
Status: DISCONTINUED | OUTPATIENT
Start: 2018-10-30 | End: 2018-10-30 | Stop reason: SDUPTHER

## 2018-10-30 RX ORDER — SODIUM CHLORIDE 0.9 % (FLUSH) 0.9 %
10 SYRINGE (ML) INJECTION EVERY 12 HOURS SCHEDULED
Status: DISCONTINUED | OUTPATIENT
Start: 2018-10-30 | End: 2018-10-30 | Stop reason: SDUPTHER

## 2018-10-30 RX ORDER — ONDANSETRON 2 MG/ML
4 INJECTION INTRAMUSCULAR; INTRAVENOUS
Status: DISCONTINUED | OUTPATIENT
Start: 2018-10-30 | End: 2018-10-30 | Stop reason: HOSPADM

## 2018-10-30 RX ORDER — SODIUM CHLORIDE 0.9 % (FLUSH) 0.9 %
10 SYRINGE (ML) INJECTION EVERY 12 HOURS SCHEDULED
Status: DISCONTINUED | OUTPATIENT
Start: 2018-10-30 | End: 2018-10-30 | Stop reason: HOSPADM

## 2018-10-30 RX ORDER — OXYCODONE HYDROCHLORIDE AND ACETAMINOPHEN 5; 325 MG/1; MG/1
2 TABLET ORAL EVERY 4 HOURS PRN
Status: DISCONTINUED | OUTPATIENT
Start: 2018-10-30 | End: 2018-10-30 | Stop reason: HOSPADM

## 2018-10-30 RX ORDER — HYDROMORPHONE HCL 110MG/55ML
0.5 PATIENT CONTROLLED ANALGESIA SYRINGE INTRAVENOUS EVERY 10 MIN PRN
Status: DISCONTINUED | OUTPATIENT
Start: 2018-10-30 | End: 2018-10-30 | Stop reason: HOSPADM

## 2018-10-30 RX ORDER — MEPERIDINE HYDROCHLORIDE 50 MG/ML
12.5 INJECTION INTRAMUSCULAR; INTRAVENOUS; SUBCUTANEOUS EVERY 5 MIN PRN
Status: DISCONTINUED | OUTPATIENT
Start: 2018-10-30 | End: 2018-10-30 | Stop reason: HOSPADM

## 2018-10-30 RX ORDER — SODIUM CHLORIDE 0.9 % (FLUSH) 0.9 %
10 SYRINGE (ML) INJECTION PRN
Status: DISCONTINUED | OUTPATIENT
Start: 2018-10-30 | End: 2018-10-30 | Stop reason: HOSPADM

## 2018-10-30 RX ORDER — FENTANYL CITRATE 50 UG/ML
50 INJECTION, SOLUTION INTRAMUSCULAR; INTRAVENOUS EVERY 10 MIN PRN
Status: DISCONTINUED | OUTPATIENT
Start: 2018-10-30 | End: 2018-10-30 | Stop reason: HOSPADM

## 2018-10-30 RX ORDER — MAGNESIUM HYDROXIDE 1200 MG/15ML
LIQUID ORAL CONTINUOUS PRN
Status: DISCONTINUED | OUTPATIENT
Start: 2018-10-30 | End: 2018-10-30 | Stop reason: HOSPADM

## 2018-10-30 RX ORDER — OXYCODONE HYDROCHLORIDE AND ACETAMINOPHEN 5; 325 MG/1; MG/1
1 TABLET ORAL EVERY 4 HOURS PRN
Status: DISCONTINUED | OUTPATIENT
Start: 2018-10-30 | End: 2018-10-30 | Stop reason: HOSPADM

## 2018-10-30 RX ORDER — LIDOCAINE HYDROCHLORIDE 10 MG/ML
1 INJECTION, SOLUTION EPIDURAL; INFILTRATION; INTRACAUDAL; PERINEURAL
Status: DISCONTINUED | OUTPATIENT
Start: 2018-10-30 | End: 2018-10-30 | Stop reason: SDUPTHER

## 2018-10-30 RX ORDER — DIPHENHYDRAMINE HYDROCHLORIDE 50 MG/ML
12.5 INJECTION INTRAMUSCULAR; INTRAVENOUS
Status: DISCONTINUED | OUTPATIENT
Start: 2018-10-30 | End: 2018-10-30 | Stop reason: HOSPADM

## 2018-10-30 RX ORDER — MORPHINE SULFATE 4 MG/ML
4 INJECTION, SOLUTION INTRAMUSCULAR; INTRAVENOUS
Status: DISCONTINUED | OUTPATIENT
Start: 2018-10-30 | End: 2018-10-30 | Stop reason: HOSPADM

## 2018-10-30 RX ORDER — HYDROCODONE BITARTRATE AND ACETAMINOPHEN 5; 325 MG/1; MG/1
1 TABLET ORAL EVERY 6 HOURS PRN
Qty: 10 TABLET | Refills: 0 | Status: SHIPPED | OUTPATIENT
Start: 2018-10-30 | End: 2021-04-05 | Stop reason: SDUPTHER

## 2018-10-30 RX ORDER — ONDANSETRON 2 MG/ML
4 INJECTION INTRAMUSCULAR; INTRAVENOUS EVERY 6 HOURS PRN
Status: DISCONTINUED | OUTPATIENT
Start: 2018-10-30 | End: 2018-10-30 | Stop reason: HOSPADM

## 2018-10-30 RX ORDER — CEFAZOLIN SODIUM 2 G/50ML
2 SOLUTION INTRAVENOUS ONCE
Status: COMPLETED | OUTPATIENT
Start: 2018-10-30 | End: 2018-10-30

## 2018-10-30 RX ORDER — SODIUM CHLORIDE, SODIUM LACTATE, POTASSIUM CHLORIDE, CALCIUM CHLORIDE 600; 310; 30; 20 MG/100ML; MG/100ML; MG/100ML; MG/100ML
INJECTION, SOLUTION INTRAVENOUS CONTINUOUS
Status: DISCONTINUED | OUTPATIENT
Start: 2018-10-30 | End: 2018-10-30 | Stop reason: HOSPADM

## 2018-10-30 RX ORDER — SODIUM CHLORIDE, SODIUM LACTATE, POTASSIUM CHLORIDE, CALCIUM CHLORIDE 600; 310; 30; 20 MG/100ML; MG/100ML; MG/100ML; MG/100ML
INJECTION, SOLUTION INTRAVENOUS CONTINUOUS
Status: DISCONTINUED | OUTPATIENT
Start: 2018-10-30 | End: 2018-10-30 | Stop reason: SDUPTHER

## 2018-10-30 RX ORDER — HYDROCODONE BITARTRATE AND ACETAMINOPHEN 5; 325 MG/1; MG/1
1 TABLET ORAL PRN
Status: DISCONTINUED | OUTPATIENT
Start: 2018-10-30 | End: 2018-10-30 | Stop reason: HOSPADM

## 2018-10-30 RX ORDER — LIDOCAINE HYDROCHLORIDE 10 MG/ML
1 INJECTION, SOLUTION EPIDURAL; INFILTRATION; INTRACAUDAL; PERINEURAL
Status: DISCONTINUED | OUTPATIENT
Start: 2018-10-30 | End: 2018-10-30 | Stop reason: HOSPADM

## 2018-10-30 RX ORDER — BUPIVACAINE HYDROCHLORIDE 5 MG/ML
INJECTION, SOLUTION EPIDURAL; INTRACAUDAL PRN
Status: DISCONTINUED | OUTPATIENT
Start: 2018-10-30 | End: 2018-10-30 | Stop reason: HOSPADM

## 2018-10-30 RX ORDER — PROPOFOL 10 MG/ML
INJECTION, EMULSION INTRAVENOUS CONTINUOUS PRN
Status: DISCONTINUED | OUTPATIENT
Start: 2018-10-30 | End: 2018-10-30 | Stop reason: SDUPTHER

## 2018-10-30 RX ORDER — METOCLOPRAMIDE HYDROCHLORIDE 5 MG/ML
10 INJECTION INTRAMUSCULAR; INTRAVENOUS
Status: DISCONTINUED | OUTPATIENT
Start: 2018-10-30 | End: 2018-10-30 | Stop reason: HOSPADM

## 2018-10-30 RX ORDER — HYDROCODONE BITARTRATE AND ACETAMINOPHEN 5; 325 MG/1; MG/1
2 TABLET ORAL PRN
Status: DISCONTINUED | OUTPATIENT
Start: 2018-10-30 | End: 2018-10-30 | Stop reason: HOSPADM

## 2018-10-30 RX ORDER — MORPHINE SULFATE 1 MG/ML
2 INJECTION, SOLUTION EPIDURAL; INTRATHECAL; INTRAVENOUS
Status: DISCONTINUED | OUTPATIENT
Start: 2018-10-30 | End: 2018-10-30 | Stop reason: HOSPADM

## 2018-10-30 RX ORDER — ACETAMINOPHEN 325 MG/1
650 TABLET ORAL EVERY 4 HOURS PRN
Status: DISCONTINUED | OUTPATIENT
Start: 2018-10-30 | End: 2018-10-30 | Stop reason: HOSPADM

## 2018-10-30 RX ADMIN — SODIUM CHLORIDE, POTASSIUM CHLORIDE, SODIUM LACTATE AND CALCIUM CHLORIDE: 600; 310; 30; 20 INJECTION, SOLUTION INTRAVENOUS at 07:11

## 2018-10-30 RX ADMIN — PROPOFOL 100 MCG/KG/MIN: 10 INJECTION, EMULSION INTRAVENOUS at 07:42

## 2018-10-30 RX ADMIN — CEFAZOLIN SODIUM 2 G: 2 SOLUTION INTRAVENOUS at 07:34

## 2018-10-30 ASSESSMENT — PULMONARY FUNCTION TESTS
PIF_VALUE: 2
PIF_VALUE: 1
PIF_VALUE: 0
PIF_VALUE: 1
PIF_VALUE: 0
PIF_VALUE: 1
PIF_VALUE: 0
PIF_VALUE: 0
PIF_VALUE: 1
PIF_VALUE: 0
PIF_VALUE: 1
PIF_VALUE: 0
PIF_VALUE: 1
PIF_VALUE: 0
PIF_VALUE: 1
PIF_VALUE: 0
PIF_VALUE: 0

## 2018-10-30 ASSESSMENT — PAIN SCALES - GENERAL
PAINLEVEL_OUTOF10: 0

## 2018-10-30 ASSESSMENT — PAIN - FUNCTIONAL ASSESSMENT: PAIN_FUNCTIONAL_ASSESSMENT: 0-10

## 2018-10-30 NOTE — OP NOTE
antibiotics. Exsanguinated with an Esmarch bandage, tourniquet inflated to 50 mmHg. A linear incision was made in line with the radial border of the 4th ray  through the skin and subcutaneous tissue using sharp and blunt  dissection. Bovie electrocautery was used for hemostasis. Transverse  carpal ligament was then identified. A small nick was placed in the  ligament using a 0.062 [place] blade. Kent elevator was then placed  deep to the ligament under direct visualization without complication. The contents of the carpal tunnel were inspected and no other  abnormalities were noted. The wound was irrigated with copious amounts  of saline irrigation and closed using #5-0 nylon suture for the skin  edges. Skin edges were infiltrated with 0.5% Marcaine. Dressed with a  bulky dressing. Tolerated the procedure well and taken to  post-anesthesia care unit in good condition without complication.         Barbi Hammond MD    D: 10/30/2018 8:13:25       T: 10/30/2018 12:32:55     [email or web address]  Job#: [id number]     Doc#: [id number]    CC:

## 2018-10-30 NOTE — ANESTHESIA PRE PROCEDURE
times per day SWAPNA Liu CNP        sodium chloride flush 0.9 % injection 10 mL  10 mL Intravenous PRN SWAPNA Liu CNP        ceFAZolin (ANCEF) 2 g in dextrose 3 % 50 mL IVPB (duplex)  2 g Intravenous Once SWAPNA Liu CNP        fentaNYL (SUBLIMAZE) injection 50 mcg  50 mcg Intravenous Q10 Min PRN Dmitriy Robles MD        HYDROmorphone (DILAUDID) injection 0.5 mg  0.5 mg Intravenous Q10 Min PRN Dmitriy Robles MD        HYDROcodone-acetaminophen [facility]) 5-325 MG per tablet 1 tablet  1 tablet Oral PRN Dmitriy Robles MD        Or    HYDROcodone-acetaminophen [facility]) 5-325 MG per tablet 2 tablet  2 tablet Oral PRN Dmitriy Robles MD        diphenhydrAMINE (BENADRYL) injection 12.5 mg  12.5 mg Intravenous Once PRN Dmitriy Robles MD        [facility]danset[facility]) injection 4 mg  4 mg Intravenous Once PRN Dmitriy Robles MD        metoclopramide [facility]) injection 10 mg  10 mg Intravenous Once PRN Dmitriy Robles MD        meperidine (DEMEROL) injection 12.5 mg  12.5 mg Intravenous Q5 Min PRN Dmitriy Robles MD        lactated ringers infusion   Intravenous Continuous Dmitriy Robles MD        sodium chloride flush 0.9 % injection 10 mL  10 mL Intravenous 2 times per day Dmitriy Robles MD        sodium chloride flush 0.9 % injection 10 mL  10 mL Intravenous PRN Dmitriy Robles MD        lidocaine PF 1 % injection 1 mL  1 mL Intradermal Once PRN Dmitriy Robles MD           Allergies:  No Known Allergies    Problem List:    Patient Active Problem List   Diagnosis Code    Anxiety F41.9    Hyperlipidemia E78.5    Sleep apnea G47.30    CAD (coronary artery disease) of artery bypass graft I25.810    Keratosis L57.0    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

## 2019-01-17 ENCOUNTER — OFFICE VISIT (OUTPATIENT)
Dept: INTERNAL MEDICINE CLINIC | Age: 69
End: 2019-01-17
Payer: COMMERCIAL

## 2019-01-17 VITALS
BODY MASS INDEX: 33.09 KG/M2 | HEART RATE: 69 BPM | SYSTOLIC BLOOD PRESSURE: 122 MMHG | HEIGHT: 67 IN | WEIGHT: 210.8 LBS | DIASTOLIC BLOOD PRESSURE: 70 MMHG | OXYGEN SATURATION: 100 % | TEMPERATURE: 97.3 F | RESPIRATION RATE: 16 BRPM

## 2019-01-17 DIAGNOSIS — I10 ESSENTIAL HYPERTENSION: Primary | ICD-10-CM

## 2019-01-17 DIAGNOSIS — F51.01 PRIMARY INSOMNIA: ICD-10-CM

## 2019-01-17 PROCEDURE — 99213 OFFICE O/P EST LOW 20 MIN: CPT | Performed by: FAMILY MEDICINE

## 2019-01-17 RX ORDER — TRAZODONE HYDROCHLORIDE 50 MG/1
TABLET ORAL
Qty: 30 TABLET | Refills: 5
Start: 2019-01-17 | End: 2019-10-07 | Stop reason: SDUPTHER

## 2019-02-18 DIAGNOSIS — F41.9 ANXIETY: ICD-10-CM

## 2019-02-18 RX ORDER — ALPRAZOLAM 0.5 MG/1
0.5 TABLET ORAL DAILY PRN
Qty: 90 TABLET | Refills: 0 | Status: SHIPPED | OUTPATIENT
Start: 2019-02-18 | End: 2019-05-23 | Stop reason: SDUPTHER

## 2019-05-08 ENCOUNTER — OFFICE VISIT (OUTPATIENT)
Dept: FAMILY MEDICINE CLINIC | Age: 69
End: 2019-05-08
Payer: COMMERCIAL

## 2019-05-08 VITALS
OXYGEN SATURATION: 99 % | RESPIRATION RATE: 16 BRPM | BODY MASS INDEX: 32.77 KG/M2 | DIASTOLIC BLOOD PRESSURE: 74 MMHG | SYSTOLIC BLOOD PRESSURE: 136 MMHG | TEMPERATURE: 97.7 F | HEART RATE: 72 BPM | WEIGHT: 208.8 LBS | HEIGHT: 67 IN

## 2019-05-08 DIAGNOSIS — L23.9 ALLERGIC CONTACT DERMATITIS, UNSPECIFIED TRIGGER: Primary | ICD-10-CM

## 2019-05-08 DIAGNOSIS — L40.9 PSORIASIS: ICD-10-CM

## 2019-05-08 DIAGNOSIS — M54.50 CHRONIC BILATERAL LOW BACK PAIN WITHOUT SCIATICA: ICD-10-CM

## 2019-05-08 DIAGNOSIS — G89.29 CHRONIC BILATERAL LOW BACK PAIN WITHOUT SCIATICA: ICD-10-CM

## 2019-05-08 PROCEDURE — 99213 OFFICE O/P EST LOW 20 MIN: CPT | Performed by: FAMILY MEDICINE

## 2019-05-08 RX ORDER — PREDNISONE 10 MG/1
TABLET ORAL
Qty: 40 TABLET | Refills: 0 | Status: SHIPPED | OUTPATIENT
Start: 2019-05-08 | End: 2019-06-12

## 2019-05-08 RX ORDER — FLUOCINOLONE ACETONIDE 0.25 MG/G
CREAM TOPICAL
Qty: 120 G | Refills: 0 | Status: SHIPPED | OUTPATIENT
Start: 2019-05-08 | End: 2021-04-12

## 2019-05-08 ASSESSMENT — PATIENT HEALTH QUESTIONNAIRE - PHQ9
SUM OF ALL RESPONSES TO PHQ QUESTIONS 1-9: 0
2. FEELING DOWN, DEPRESSED OR HOPELESS: 0
1. LITTLE INTEREST OR PLEASURE IN DOING THINGS: 0
SUM OF ALL RESPONSES TO PHQ QUESTIONS 1-9: 0
SUM OF ALL RESPONSES TO PHQ9 QUESTIONS 1 & 2: 0

## 2019-05-08 NOTE — PROGRESS NOTES
Patient: Jamison Villa    YOB: 1950    Date: 5/8/19    Chief Complaint   Patient presents with    Rash     He complains of itchy red rash with bumps on his scalp, face and back for one month. Patient Active Problem List    Diagnosis Date Noted    Carpal tunnel syndrome 10/22/2018    Dyshidrotic eczema 08/23/2018    Varicose veins of both lower extremities 07/31/2018    Essential hypertension 11/16/2016    Local neurodermatitis 08/11/2016    Keratosis     CAD (coronary artery disease) of artery bypass graft 01/31/2012    Anxiety     Hyperlipidemia     Sleep apnea        No Known Allergies    Vitals:    05/08/19 1533   BP: 136/74   Site: Right Upper Arm   Position: Sitting   Cuff Size: Large Adult   Pulse: 72   Resp: 16   Temp: 97.7 °F (36.5 °C)   TempSrc: Oral   SpO2: 99%   Weight: 208 lb 12.8 oz (94.7 kg)   Height: 5' 7\" (1.702 m)     Body mass index is 32.7 kg/m². HPI    He comes in with an itchy rash around his eyes on the back of his neck for a month. Can't think of any new exposures. He is otherwise well without wheezing tongue swelling or lip swelling he's had no new medications no new foods and he's never had this reaction before. Review of Systems    Constitutional: Negative for fatigue, fever and sweats. HEENT: Negative for eye discharge and vision loss. Negative for ear drainage, hearing loss and nasal drainage. Respiratory: Negative for cough, dyspnea and wheezing. Cardiovascular:  Negative for chest pain, claudication and irregular heartbeat/palpitations. Gastrointestinal: Negative for abdominal pain, nausea, vomiting, constipation and diarrhea. Genitourinary: No dysuria or penile discharge. Metabolic/Endocrine: Negative for cold intolerance, heat intolerance, polydipsia and polyphagia. No unintended weight loss or gain. Neuro/Psychiatric: Negative for gait disturbance. Negative for psychiatric symptoms.   Dermatologic: Negative for pruritus and positive for rash. Musculoskeletal: Negative for bone/joint symptoms. No numbness or tingling. No weakness. Hematology: Negative for bleeding and easy bruising. Immunology:  Negative for environmental allergies and food allergies. Physical Exam    Patient's medication, allergies, past medical, surgical, social and family histories were reviewed and updated as appropriate. PHYSICAL EXAM   General appearance: Alert oriented pleasant cooperative in no acute distress. HEENT: His eyes are clear but the blood process surrounding his eyes and the skin is slightly inflamed and erythematous. The eyelids are not swollen shut. The conjunctiva is normal.  Examining his back he has hives on the back of his neck. He also has a patch of large psoriasis on the right side of his scalp. Lungs: Clear to auscultation  Heart: Regular rate and rhythm          Assessment:   Diagnosis Orders   1. Allergic contact dermatitis, unspecified trigger  predniSONE (DELTASONE) 10 MG tablet  Is just can use 1% hydrocortisone cream over-the-counter for his eyes. She'll use the stronger fluticasone for his back of his neck and his scalp. fluocinolone (SYNALAR) 0.025 % cream   2. Chronic bilateral low back pain without sciatica  diclofenac (VOLTAREN) 50 MG EC tablet   3. Psoriasis  fluocinolone (SYNALAR) 0.025 % cream               Plan:  Current Outpatient Medications   Medication Sig Dispense Refill    predniSONE (DELTASONE) 10 MG tablet 4 PO each day for 4 days, 3 PO each day for 4 days, 2 PO each day for 4 days, 1 PO each day until gone 40 tablet 0    fluocinolone (SYNALAR) 0.025 % cream Apply topically 2 times daily.  120 g 0    diclofenac (VOLTAREN) 50 MG EC tablet Take 1 tablet by mouth 3 times daily as needed for Pain 60 tablet 1    metoprolol succinate (TOPROL XL) 50 MG extended release tablet TAKE 1 TABLET BY MOUTH EVERY DAY 90 tablet 3    ALPRAZolam (XANAX) 0.5 MG tablet Take 1 tablet by mouth daily as needed for Anxiety for up to 182 days. . 90 tablet 0    traZODone (DESYREL) 50 MG tablet 0.5 tab po at night prn insomnia 30 tablet 5    carvedilol (COREG) 25 MG tablet Take 1 tablet by mouth 2 times daily 90 tablet 0    lisinopril (PRINIVIL;ZESTRIL) 20 MG tablet Take 1 tablet by mouth 2 times daily. 180 tablet 3    atorvastatin (LIPITOR) 80 MG tablet Take 1 tablet by mouth daily. 90 tablet 3    aspirin 81 MG EC tablet Take 81 mg by mouth daily. No current facility-administered medications for this visit. No orders of the defined types were placed in this encounter. Orders Placed This Encounter   Medications    predniSONE (DELTASONE) 10 MG tablet     Si PO each day for 4 days, 3 PO each day for 4 days, 2 PO each day for 4 days, 1 PO each day until gone     Dispense:  40 tablet     Refill:  0    fluocinolone (SYNALAR) 0.025 % cream     Sig: Apply topically 2 times daily. Dispense:  120 g     Refill:  0    diclofenac (VOLTAREN) 50 MG EC tablet     Sig: Take 1 tablet by mouth 3 times daily as needed for Pain     Dispense:  60 tablet     Refill:  1              Return in about 2 weeks (around 2019).     Dr. Ernesto Nevarez      19  4:45 PM

## 2019-05-23 ENCOUNTER — OFFICE VISIT (OUTPATIENT)
Dept: FAMILY MEDICINE CLINIC | Age: 69
End: 2019-05-23
Payer: COMMERCIAL

## 2019-05-23 DIAGNOSIS — I10 ESSENTIAL HYPERTENSION: Primary | ICD-10-CM

## 2019-05-23 DIAGNOSIS — L23.9 ALLERGIC CONTACT DERMATITIS, UNSPECIFIED TRIGGER: ICD-10-CM

## 2019-05-23 DIAGNOSIS — F41.9 ANXIETY: ICD-10-CM

## 2019-05-23 PROCEDURE — 99213 OFFICE O/P EST LOW 20 MIN: CPT | Performed by: FAMILY MEDICINE

## 2019-05-23 RX ORDER — ALPRAZOLAM 0.5 MG/1
0.5 TABLET ORAL DAILY PRN
Qty: 90 TABLET | Refills: 0 | Status: SHIPPED | OUTPATIENT
Start: 2019-05-23 | End: 2019-06-12 | Stop reason: SDUPTHER

## 2019-05-23 NOTE — PROGRESS NOTES
Patient: Silvia Barnard    YOB: 1950    Date: 5/23/19    Chief Complaint   Patient presents with    Rash     2 week follow up rash. Rash is much better.  Medication Refill     Xanax, urine drug screen and contract done on 7/31/2018       Patient Active Problem List    Diagnosis Date Noted    Carpal tunnel syndrome 10/22/2018    Dyshidrotic eczema 08/23/2018    Varicose veins of both lower extremities 07/31/2018    Essential hypertension 11/16/2016    Local neurodermatitis 08/11/2016    Keratosis     CAD (coronary artery disease) of artery bypass graft 01/31/2012    Anxiety     Hyperlipidemia     Sleep apnea        No Known Allergies    There were no vitals filed for this visit. There is no height or weight on file to calculate BMI. HPI    The rash on his eyes and face it completely resolved but he's feeling better. He wanted to get a refill of his anxiety medicine which we discussed stopping or decreasing once he retires. Review of Systems    Constitutional: Negative for fatigue, fever and sweats. HEENT: Negative for eye discharge and vision loss. Negative for ear drainage, hearing loss and nasal drainage. Respiratory: Negative for cough, dyspnea and wheezing. Cardiovascular:  Negative for chest pain, claudication and irregular heartbeat/palpitations. Gastrointestinal: Negative for abdominal pain, nausea, vomiting, constipation and diarrhea. Genitourinary: No dysuria or penile discharge. Metabolic/Endocrine: Negative for cold intolerance, heat intolerance, polydipsia and polyphagia. No unintended weight loss or gain. Neuro/Psychiatric: Negative for gait disturbance. Negative for psychiatric symptoms. Dermatologic: Negative for pruritus and rash. Musculoskeletal: Negative for bone/joint symptoms. No numbness or tingling. No weakness. Hematology: Negative for bleeding and easy bruising.     Physical Exam    Patient's medication, allergies, past medical, surgical,

## 2019-06-11 LAB
ALBUMIN SERPL-MCNC: 4.4 G/DL (ref 3.5–4.6)
ALP BLD-CCNC: 53 U/L (ref 35–104)
ALT SERPL-CCNC: 27 U/L (ref 0–41)
ANION GAP SERPL CALCULATED.3IONS-SCNC: 12 MEQ/L (ref 9–15)
AST SERPL-CCNC: 27 U/L (ref 0–40)
BILIRUB SERPL-MCNC: 0.4 MG/DL (ref 0.2–0.7)
BILIRUBIN DIRECT: <0.2 MG/DL (ref 0–0.4)
BILIRUBIN, INDIRECT: NORMAL MG/DL (ref 0–0.6)
BUN BLDV-MCNC: 11 MG/DL (ref 8–23)
CALCIUM SERPL-MCNC: 8.9 MG/DL (ref 8.5–9.9)
CHLORIDE BLD-SCNC: 106 MEQ/L (ref 95–107)
CHOLESTEROL, TOTAL: 114 MG/DL (ref 0–199)
CO2: 22 MEQ/L (ref 20–31)
CREAT SERPL-MCNC: 0.76 MG/DL (ref 0.7–1.2)
GFR AFRICAN AMERICAN: >60
GFR NON-AFRICAN AMERICAN: >60
GLUCOSE BLD-MCNC: 92 MG/DL (ref 70–99)
HCT VFR BLD CALC: 41 % (ref 42–52)
HDLC SERPL-MCNC: 36 MG/DL (ref 40–59)
HEMOGLOBIN: 14.4 G/DL (ref 14–18)
LDL CHOLESTEROL CALCULATED: 62 MG/DL (ref 0–129)
MCH RBC QN AUTO: 33.4 PG (ref 27–31.3)
MCHC RBC AUTO-ENTMCNC: 35.2 % (ref 33–37)
MCV RBC AUTO: 94.8 FL (ref 80–100)
PDW BLD-RTO: 12.8 % (ref 11.5–14.5)
PLATELET # BLD: 364 K/UL (ref 130–400)
POTASSIUM SERPL-SCNC: 4.4 MEQ/L (ref 3.4–4.9)
RBC # BLD: 4.33 M/UL (ref 4.7–6.1)
SODIUM BLD-SCNC: 140 MEQ/L (ref 135–144)
TOTAL PROTEIN: 6.8 G/DL (ref 6.3–8)
TRIGL SERPL-MCNC: 81 MG/DL (ref 0–150)
WBC # BLD: 8.6 K/UL (ref 4.8–10.8)

## 2019-06-12 ENCOUNTER — OFFICE VISIT (OUTPATIENT)
Dept: FAMILY MEDICINE CLINIC | Age: 69
End: 2019-06-12
Payer: COMMERCIAL

## 2019-06-12 VITALS
DIASTOLIC BLOOD PRESSURE: 80 MMHG | TEMPERATURE: 97.7 F | SYSTOLIC BLOOD PRESSURE: 144 MMHG | HEIGHT: 66 IN | OXYGEN SATURATION: 99 % | HEART RATE: 82 BPM | RESPIRATION RATE: 16 BRPM | WEIGHT: 207 LBS | BODY MASS INDEX: 33.27 KG/M2

## 2019-06-12 DIAGNOSIS — L40.9 PSORIASIS: Primary | ICD-10-CM

## 2019-06-12 DIAGNOSIS — F41.9 ANXIETY: ICD-10-CM

## 2019-06-12 PROCEDURE — 99213 OFFICE O/P EST LOW 20 MIN: CPT | Performed by: NURSE PRACTITIONER

## 2019-06-12 RX ORDER — CLOBETASOL PROPIONATE 0.5 MG/G
AEROSOL, FOAM TOPICAL
Qty: 100 G | Refills: 2 | Status: SHIPPED | OUTPATIENT
Start: 2019-06-12 | End: 2020-03-02

## 2019-06-12 RX ORDER — ALPRAZOLAM 0.5 MG/1
0.5 TABLET ORAL DAILY PRN
Qty: 30 TABLET | Refills: 0 | Status: SHIPPED | OUTPATIENT
Start: 2019-06-12 | End: 2019-07-22 | Stop reason: SDUPTHER

## 2019-06-12 RX ORDER — ALPRAZOLAM 0.5 MG/1
0.5 TABLET ORAL DAILY PRN
Qty: 90 TABLET | Refills: 0 | Status: CANCELLED | OUTPATIENT
Start: 2019-06-12 | End: 2019-12-11

## 2019-06-12 NOTE — PROGRESS NOTES
Subjective:     Patient ID: Florencia Galarza is a 81 y.o. male who presentstoday for:  Chief Complaint   Patient presents with    Psoriasis     Pt. is here with c/o psoriasis in his scalp and in his ears. Pt. states he was prescribed steroids in the past which took it away until now. Pt. c/o discharge in his ears, flaking and itching.  Health Maintenance     Pt. is still waiting for his pharmacy to get the Shingrix vaccine for a year now, would like a new prescription. Rash   This is a recurrent problem. The current episode started more than 1 month ago. The problem has been waxing and waning since onset. The affected locations include the scalp. The rash is characterized by itchiness and peeling. He was exposed to nothing. Pertinent negatives include no anorexia, congestion, cough, diarrhea, eye pain, facial edema, fatigue, fever, joint pain, nail changes, rhinorrhea, shortness of breath, sore throat or vomiting. Past treatments include oral steroids. The treatment provided moderate relief. Past Medical History:   Diagnosis Date    Anxiety     CAD (coronary artery disease) 2012    had CABG    HIGH CHOLESTEROL     Hypercholesterolemia     Hyperlipidemia     Hypertension     Keratosis     Psoriasis     Sleep apnea      Current Outpatient Medications on File Prior to Visit   Medication Sig Dispense Refill    fluocinolone (SYNALAR) 0.025 % cream Apply topically 2 times daily. 120 g 0    diclofenac (VOLTAREN) 50 MG EC tablet Take 1 tablet by mouth 3 times daily as needed for Pain 60 tablet 1    metoprolol succinate (TOPROL XL) 50 MG extended release tablet TAKE 1 TABLET BY MOUTH EVERY DAY 90 tablet 3    traZODone (DESYREL) 50 MG tablet 0.5 tab po at night prn insomnia 30 tablet 5    carvedilol (COREG) 25 MG tablet Take 1 tablet by mouth 2 times daily 90 tablet 0    lisinopril (PRINIVIL;ZESTRIL) 20 MG tablet Take 1 tablet by mouth 2 times daily.  180 tablet 3    atorvastatin (LIPITOR) 80 MG tablet Take 1 tablet by mouth daily. 90 tablet 3    aspirin 81 MG EC tablet Take 81 mg by mouth daily. No current facility-administered medications on file prior to visit. Past Surgical History:   Procedure Laterality Date    CARDIAC SURGERY      CARPAL TUNNEL RELEASE Left     COLONOSCOPY  9-10-12    dr Carline Hagen /2 polyps, diverticulosis, int hemmorrhoids    CORONARY ARTERY BYPASS GRAFT  12    CABG X 3    WI REVISE MEDIAN N/CARPAL TUNNEL SURG Right 10/30/2018    RIGHT WRIST CARPAL TUNNEL RELEASE SUPINE performed by Luis Enrique Mabry [facility] at [facility]        Family History   Problem Relation Age of Onset    Coronary Art Dis Mother     Heart Failure Mother     Heart Disease Mother     Heart Surgery Sister     Cancer Sister         breast    Heart Disease Sister     Heart Disease Father     Heart Disease Brother      Social History     Socioeconomic History    Marital status:      Spouse name: Not on file    Number of children: Not on file    Years of education: Not on file    Highest education level: Not on file   Occupational History    Not on file   Social Needs    Financial resource strain: Not on file    Food insecurity:     Worry: Not on file     Inability: Not on file    Transportation needs:     Medical: Not on file     Non-medical: Not on file   Tobacco Use    Smoking status: Former Smoker     Packs/day: 0.50     Years: 3.00     Pack years: 1.50     Types: Cigarettes     Last attempt to quit: 2012     Years since quittin.4    Smokeless tobacco: Never Used   Substance and Sexual Activity    Alcohol use: Yes     Comment: OCC.     Drug use: No    Sexual activity: Not Currently   Lifestyle    Physical activity:     Days per week: Not on file     Minutes per session: Not on file    Stress: Not on file   Relationships    Social connections:     Talks on phone: Not on file     Gets together: Not on file     Attends Muslim service: Not on file     Active member of club or organization: Not on file     Attends meetings of clubs or organizations: Not on file     Relationship status: Not on file    Intimate partner violence:     Fear of current or ex partner: Not on file     Emotionally abused: Not on file     Physically abused: Not on file     Forced sexual activity: Not on file   Other Topics Concern    Not on file   Social History Narrative    Not on file     Allergies:  Patient has no known allergies. Review of Systems   Constitutional: Negative for chills, diaphoresis, fatigue and fever. HENT: Negative for congestion, rhinorrhea and sore throat. Eyes: Negative. Negative for pain. Respiratory: Negative for cough, chest tightness, shortness of breath and wheezing. Cardiovascular: Negative for leg swelling. Gastrointestinal: Negative for anorexia, diarrhea, nausea and vomiting. Musculoskeletal: Negative. Negative for joint pain. Skin: Positive for rash. Negative for nail changes. Neurological: Negative for dizziness, syncope, light-headedness and headaches. Objective:    BP (!) 144/80   Pulse 82   Temp 97.7 °F (36.5 °C) (Oral)   Resp 16   Ht 5' 6\" (1.676 m)   Wt 207 lb (93.9 kg)   SpO2 99%   BMI 33.41 kg/m²     Physical Exam   Constitutional: He is oriented to person, place, and time. He appears well-developed and well-nourished. No distress. HENT:   Head: Normocephalic and atraumatic. Nose: Nose normal.   Mouth/Throat: Oropharynx is clear and moist.   Eyes: Conjunctivae are normal.   Neck: Normal range of motion. Neck supple. Cardiovascular: Normal rate, regular rhythm and normal heart sounds. Pulmonary/Chest: Effort normal and breath sounds normal. No respiratory distress. He has no wheezes. Musculoskeletal: He exhibits no edema or tenderness. Lymphadenopathy:     He has no cervical adenopathy. Neurological: He is alert and oriented to person, place, and time.    Skin: Skin is warm. Rash (plaque like lesions through-out scalp) noted. He is not diaphoretic. Assessment & Plan:       Diagnosis Orders   1. Dimitrios Govea MD, Dermatology, [facility]    clobetasol (OLUX) 0.05 % foam   2. Anxiety  ALPRAZolam (XANAX) 0.5 MG tablet     Orders Placed This Encounter   Procedures   Rebekah Alexandre MD, Dermatology, [facility]     Referral Priority:   Routine     Referral Type:   Eval and Treat     Referral Reason:   Specialty Services Required     Referred to Provider:   Hiram Cordoba MD     Requested Specialty:   Dermatology     Number of Visits Requested:   1     Orders Placed This Encounter   Medications    zoster recombinant adjuvanted vaccine [facility]) 50 MCG/0.5ML SUSR injection     Sig: Inject 0.5 mLs into the muscle once for 1 dose     Dispense:  0.5 mL     Refill:  1    DISCONTD: desonide (VERDESO) 0.05 % foam     Sig: Apply topically 2 times daily. Dispense:  1 Can     Refill:  1    ALPRAZolam (XANAX) 0.5 MG tablet     Sig: Take 1 tablet by mouth daily as needed for Anxiety for up to 30 days. Dispense:  30 tablet     Refill:  0    clobetasol (OLUX) 0.05 % foam     Sig: Apply topically 2 times daily. Dispense:  100 g     Refill:  2     Medications Discontinued During This Encounter   Medication Reason    ALPRAZolam (XANAX) 0.5 MG tablet REORDER    desonide (VERDESO) 0.05 % foam      Return in about 2 weeks (around 6/26/2019) for PCP follow-up. Reviewed with the patient: currentclinical status, medications, activities and diet. Side effects, adverse effects of the medicationprescribed today, as well as treatment plan/ rationale and result expectations havebeen discussed with the patient who expresses understanding and desires to proceed. Pt instructions reviewed and given to patient.     Close follow up to evaluate treatment resultsand for coordination of care.   I have reviewed the patient's medical historyin detail and updated the computerized patient record.     SWAPNA Blackburn - CNP

## 2019-06-15 ASSESSMENT — ENCOUNTER SYMPTOMS
NAUSEA: 0
EYES NEGATIVE: 1
SORE THROAT: 0
WHEEZING: 0
CHEST TIGHTNESS: 0
VOMITING: 0
COUGH: 0
SHORTNESS OF BREATH: 0
RHINORRHEA: 0
EYE PAIN: 0
DIARRHEA: 0
NAIL CHANGES: 0

## 2019-06-19 ENCOUNTER — TELEPHONE (OUTPATIENT)
Dept: FAMILY MEDICINE CLINIC | Age: 69
End: 2019-06-19

## 2019-06-19 NOTE — TELEPHONE ENCOUNTER
The patients wife called to let you know that he has an appointment set with a dermatologist on July 10th. If you have any questions, she is available at [phone].

## 2019-07-22 DIAGNOSIS — F41.9 ANXIETY: ICD-10-CM

## 2019-07-22 DIAGNOSIS — F51.01 PRIMARY INSOMNIA: ICD-10-CM

## 2019-07-22 RX ORDER — ALPRAZOLAM 0.5 MG/1
0.5 TABLET ORAL DAILY PRN
Qty: 30 TABLET | Refills: 0 | Status: SHIPPED | OUTPATIENT
Start: 2019-07-22 | End: 2019-08-21

## 2019-07-23 ENCOUNTER — NURSE ONLY (OUTPATIENT)
Dept: FAMILY MEDICINE CLINIC | Age: 69
End: 2019-07-23

## 2019-07-23 DIAGNOSIS — Z79.899 ENCOUNTER FOR LONG-TERM CURRENT USE OF MEDICATION: Primary | ICD-10-CM

## 2019-07-23 DIAGNOSIS — F41.9 ANXIETY: Primary | ICD-10-CM

## 2019-09-05 DIAGNOSIS — F41.9 ANXIETY: ICD-10-CM

## 2019-09-05 RX ORDER — ALPRAZOLAM 0.5 MG/1
0.5 TABLET ORAL DAILY PRN
Qty: 90 TABLET | Refills: 0 | OUTPATIENT
Start: 2019-09-05 | End: 2020-03-05

## 2019-09-06 RX ORDER — ALPRAZOLAM 0.5 MG/1
0.5 TABLET ORAL DAILY PRN
Qty: 90 TABLET | Refills: 0 | Status: SHIPPED | OUTPATIENT
Start: 2019-09-06 | End: 2019-12-10 | Stop reason: SDUPTHER

## 2019-09-09 ENCOUNTER — TELEPHONE (OUTPATIENT)
Dept: FAMILY MEDICINE CLINIC | Age: 69
End: 2019-09-09

## 2019-10-07 DIAGNOSIS — F51.01 PRIMARY INSOMNIA: ICD-10-CM

## 2019-10-07 RX ORDER — TRAZODONE HYDROCHLORIDE 50 MG/1
TABLET ORAL
Qty: 30 TABLET | Refills: 5 | Status: SHIPPED | OUTPATIENT
Start: 2019-10-07 | End: 2020-10-09 | Stop reason: SDUPTHER

## 2020-01-23 LAB
ALBUMIN SERPL-MCNC: 4.4 G/DL (ref 3.5–4.6)
ALP BLD-CCNC: 60 U/L (ref 35–104)
ALT SERPL-CCNC: 36 U/L (ref 0–41)
ANION GAP SERPL CALCULATED.3IONS-SCNC: 16 MEQ/L (ref 9–15)
AST SERPL-CCNC: 34 U/L (ref 0–40)
BILIRUB SERPL-MCNC: 0.6 MG/DL (ref 0.2–0.7)
BILIRUBIN DIRECT: <0.2 MG/DL (ref 0–0.4)
BILIRUBIN, INDIRECT: NORMAL MG/DL (ref 0–0.6)
BUN BLDV-MCNC: 12 MG/DL (ref 8–23)
CALCIUM SERPL-MCNC: 9.2 MG/DL (ref 8.5–9.9)
CHLORIDE BLD-SCNC: 98 MEQ/L (ref 95–107)
CHOLESTEROL, TOTAL: 118 MG/DL (ref 0–199)
CO2: 23 MEQ/L (ref 20–31)
CREAT SERPL-MCNC: 0.83 MG/DL (ref 0.7–1.2)
GFR AFRICAN AMERICAN: >60
GFR NON-AFRICAN AMERICAN: >60
GLUCOSE BLD-MCNC: 103 MG/DL (ref 70–99)
HCT VFR BLD CALC: 45 % (ref 42–52)
HDLC SERPL-MCNC: 37 MG/DL (ref 40–59)
HEMOGLOBIN: 15.2 G/DL (ref 14–18)
LDL CHOLESTEROL CALCULATED: 63 MG/DL (ref 0–129)
MCH RBC QN AUTO: 32 PG (ref 27–31.3)
MCHC RBC AUTO-ENTMCNC: 33.9 % (ref 33–37)
MCV RBC AUTO: 94.6 FL (ref 80–100)
PDW BLD-RTO: 12.9 % (ref 11.5–14.5)
PLATELET # BLD: 376 K/UL (ref 130–400)
POTASSIUM SERPL-SCNC: 4.5 MEQ/L (ref 3.4–4.9)
RBC # BLD: 4.75 M/UL (ref 4.7–6.1)
SODIUM BLD-SCNC: 137 MEQ/L (ref 135–144)
TOTAL PROTEIN: 6.8 G/DL (ref 6.3–8)
TRIGL SERPL-MCNC: 88 MG/DL (ref 0–150)
WBC # BLD: 9.9 K/UL (ref 4.8–10.8)

## 2020-03-02 ENCOUNTER — OFFICE VISIT (OUTPATIENT)
Dept: FAMILY MEDICINE CLINIC | Age: 70
End: 2020-03-02
Payer: MEDICARE

## 2020-03-02 VITALS
DIASTOLIC BLOOD PRESSURE: 70 MMHG | TEMPERATURE: 97.5 F | HEART RATE: 95 BPM | BODY MASS INDEX: 32.02 KG/M2 | OXYGEN SATURATION: 96 % | SYSTOLIC BLOOD PRESSURE: 124 MMHG | HEIGHT: 67 IN | RESPIRATION RATE: 16 BRPM | WEIGHT: 204 LBS

## 2020-03-02 PROCEDURE — G8482 FLU IMMUNIZE ORDER/ADMIN: HCPCS | Performed by: FAMILY MEDICINE

## 2020-03-02 PROCEDURE — G8417 CALC BMI ABV UP PARAM F/U: HCPCS | Performed by: FAMILY MEDICINE

## 2020-03-02 PROCEDURE — 1036F TOBACCO NON-USER: CPT | Performed by: FAMILY MEDICINE

## 2020-03-02 PROCEDURE — 99213 OFFICE O/P EST LOW 20 MIN: CPT | Performed by: FAMILY MEDICINE

## 2020-03-02 PROCEDURE — G8427 DOCREV CUR MEDS BY ELIG CLIN: HCPCS | Performed by: FAMILY MEDICINE

## 2020-03-02 PROCEDURE — 4040F PNEUMOC VAC/ADMIN/RCVD: CPT | Performed by: FAMILY MEDICINE

## 2020-03-02 PROCEDURE — 1123F ACP DISCUSS/DSCN MKR DOCD: CPT | Performed by: FAMILY MEDICINE

## 2020-03-02 PROCEDURE — 90732 PPSV23 VACC 2 YRS+ SUBQ/IM: CPT | Performed by: FAMILY MEDICINE

## 2020-03-02 PROCEDURE — 3017F COLORECTAL CA SCREEN DOC REV: CPT | Performed by: FAMILY MEDICINE

## 2020-03-02 PROCEDURE — G0009 ADMIN PNEUMOCOCCAL VACCINE: HCPCS | Performed by: FAMILY MEDICINE

## 2020-03-02 RX ORDER — BETAMETHASONE DIPROPIONATE 0.5 MG/G
CREAM TOPICAL
Qty: 50 G | Refills: 0 | Status: SHIPPED | OUTPATIENT
Start: 2020-03-02 | End: 2020-04-01

## 2020-03-02 RX ORDER — ALPRAZOLAM 0.5 MG/1
0.5 TABLET ORAL DAILY PRN
Qty: 90 TABLET | Refills: 0 | Status: CANCELLED | OUTPATIENT
Start: 2020-03-02 | End: 2020-08-31

## 2020-03-02 RX ORDER — NITROGLYCERIN 0.4 MG/1
TABLET SUBLINGUAL
COMMUNITY
Start: 2020-01-29

## 2020-03-02 RX ORDER — CEPHALEXIN 500 MG/1
500 CAPSULE ORAL 4 TIMES DAILY
Qty: 40 CAPSULE | Refills: 0 | Status: SHIPPED | OUTPATIENT
Start: 2020-03-02 | End: 2021-04-12

## 2020-03-02 NOTE — PROGRESS NOTES
Patient: Mela Bowman    YOB: 1950    Date: 3/2/20    Chief Complaint   Patient presents with    Swelling     He complains of bilateral leg swelling and red painful rash, right leg worse for 4 days.  Immunizations     He is due for pneumonia vaccine.  Medication Refill     Pending       Patient Active Problem List    Diagnosis Date Noted    Carpal tunnel syndrome 10/22/2018    Dyshidrotic eczema 08/23/2018    Varicose veins of both lower extremities 07/31/2018    Essential hypertension 11/16/2016    Local neurodermatitis 08/11/2016    Keratosis     CAD (coronary artery disease) of artery bypass graft 01/31/2012    Anxiety     Hyperlipidemia     Sleep apnea        No Known Allergies    Vitals:    03/02/20 1300   BP: 124/70   Site: Left Upper Arm   Position: Sitting   Cuff Size: Large Adult   Pulse: 95   Resp: 16   Temp: 97.5 °F (36.4 °C)   TempSrc: Oral   SpO2: 96%   Weight: 204 lb (92.5 kg)   Height: 5' 7\" (1.702 m)     Body mass index is 31.95 kg/m². HPI    He comes in with swelling and redness of the right leg. He has a chronic rash that always flares and itches a great deal that he uses a cream for. He now has this rash as stated above. It is warm irritated and is painful. No injury. No shortness of breath chest pain cough nausea vomiting or diarrhea. He got lab work done by his cardiologist in December    Review of Systems    Constitutional: Negative for fatigue, fever and positive for night sweats. HEENT: Negative for eye discharge and vision loss. Negative for ear drainage, hearing loss and nasal drainage. Respiratory: Negative for cough, dyspnea and wheezing. Cardiovascular:  Negative for chest pain, claudication and irregular heartbeat/palpitations. Gastrointestinal: Negative for abdominal pain, nausea, vomiting, constipation and positive for diarrhea. Genitourinary: No dysuria or penile discharge.   Metabolic/Endocrine: Negative for cold intolerance, heat intolerance, polydipsia and polyphagia. No unintended weight loss or gain. Neuro/Psychiatric: Negative for gait disturbance. Negative for psychiatric symptoms. Dermatologic: Negative for pruritus and positive rash. Musculoskeletal: Negative for bone/joint symptoms. No numbness or tingling. No weakness. Hematology: Negative for bleeding and easy bruising. Immunology:  Negative for environmental allergies and food allergies. Physical Exam    Patient's medication, allergies, past medical, surgical, social and family histories were reviewed and updated as appropriate. PHYSICAL EXAM   General appearance: Alert oriented pleasant cooperative no acute distress. Lungs: Clear to auscultation  Heart: Regular rate and rhythm  Extremities: Right leg he has an erythematous area irregular macule and patch of on the dorsum of his right leg with generalized swelling. He has also small nummular scaly patches scattered on his legs. No calf tenderness no ecchymosis. Assessment:   Diagnosis Orders   1. Cellulitis of right lower extremity  cephALEXin (KEFLEX) 500 MG capsule   2. Anxiety   I discontinued his Xanax as he told me when he retired he would be able to come off of it so he is now retired. 3. Right leg swelling  US DUP LOWER EXTREMITY RIGHT JAVIER   4. Dermatitis  AFL - Thom Morrison MD, Dermatology, [facility]  Pending getting to see the dermatologist I will start him on some Diprolene and stop the clobetasol   5.  Need for pneumococcal vaccination  PNEUMOVAX 23 subcutaneous/IM (Pneumococcal polysaccharide vaccine 23-valent >= 1yo)               Plan:  Current Outpatient Medications   Medication Sig Dispense Refill    nitroGLYCERIN (NITROSTAT) 0.4 MG SL tablet DISSOLVE 1 TABLET UNDER THE TONGUE AS NEEDED FOR CHEST PAIN- MAY REPEAT EVERY 5 MINUTES IF NEEDED ( MAX 3 DOSES.- IF NO RELIEF CALL 911)      cephALEXin (KEFLEX) 500 MG capsule Take 1 capsule by mouth 4 times daily 40 capsule 0    augmented betamethasone dipropionate (DIPROLENE-AF) 0.05 % cream Apply two times daily to affected area. 50 g 0    traZODone (DESYREL) 50 MG tablet 0.5 tab po at night prn insomnia 30 tablet 5    fluocinolone (SYNALAR) 0.025 % cream Apply topically 2 times daily. 120 g 0    metoprolol succinate (TOPROL XL) 50 MG extended release tablet TAKE 1 TABLET BY MOUTH EVERY DAY 90 tablet 3    lisinopril (PRINIVIL;ZESTRIL) 20 MG tablet Take 1 tablet by mouth 2 times daily. 180 tablet 3    atorvastatin (LIPITOR) 80 MG tablet Take 1 tablet by mouth daily. 90 tablet 3    aspirin 81 MG EC tablet Take 81 mg by mouth daily. No current facility-administered medications for this visit. Orders Placed This Encounter   Procedures    US DUP LOWER EXTREMITY RIGHT JAVIER     Standing Status:   Future     Standing Expiration Date:   3/2/2021    PNEUMOVAX 23 subcutaneous/IM (Pneumococcal polysaccharide vaccine 23-valent >= 1yo)   Luis Kessler MD, Dermatology, [facility]     Referral Priority:   Routine     Referral Type:   Eval and Treat     Referral Reason:   Specialty Services Required     Referred to Provider:   Johnson Abarca DO     Requested Specialty:   Dermatology     Number of Visits Requested:   1       Orders Placed This Encounter   Medications    cephALEXin (KEFLEX) 500 MG capsule     Sig: Take 1 capsule by mouth 4 times daily     Dispense:  40 capsule     Refill:  0    augmented betamethasone dipropionate (DIPROLENE-AF) 0.05 % cream     Sig: Apply two times daily to affected area. Dispense:  50 g     Refill:  0              Return in about 2 weeks (around 3/16/2020).     Dr. Anthony Parada      3/2/20  1:25 PM

## 2020-03-07 ENCOUNTER — HOSPITAL ENCOUNTER (OUTPATIENT)
Dept: ULTRASOUND IMAGING | Age: 70
Discharge: HOME | End: 2020-03-09
Payer: MEDICARE

## 2020-03-07 PROCEDURE — 93971 EXTREMITY STUDY: CPT

## 2020-07-17 LAB
ALBUMIN SERPL-MCNC: 4.1 G/DL (ref 3.5–4.6)
ALP BLD-CCNC: 58 U/L (ref 35–104)
ALT SERPL-CCNC: 22 U/L (ref 0–41)
ANION GAP SERPL CALCULATED.3IONS-SCNC: 12 MEQ/L (ref 9–15)
AST SERPL-CCNC: 22 U/L (ref 0–40)
BILIRUB SERPL-MCNC: 0.4 MG/DL (ref 0.2–0.7)
BILIRUBIN DIRECT: <0.2 MG/DL (ref 0–0.4)
BILIRUBIN, INDIRECT: NORMAL MG/DL (ref 0–0.6)
BUN BLDV-MCNC: 14 MG/DL (ref 8–23)
CALCIUM SERPL-MCNC: 9.4 MG/DL (ref 8.5–9.9)
CHLORIDE BLD-SCNC: 103 MEQ/L (ref 95–107)
CHOLESTEROL, TOTAL: 129 MG/DL (ref 0–199)
CO2: 24 MEQ/L (ref 20–31)
CREAT SERPL-MCNC: 0.8 MG/DL (ref 0.7–1.2)
GFR AFRICAN AMERICAN: >60
GFR NON-AFRICAN AMERICAN: >60
GLUCOSE BLD-MCNC: 102 MG/DL (ref 70–99)
HCT VFR BLD CALC: 44.9 % (ref 42–52)
HDLC SERPL-MCNC: 40 MG/DL (ref 40–59)
HEMOGLOBIN: 15.2 G/DL (ref 14–18)
LDL CHOLESTEROL CALCULATED: 70 MG/DL (ref 0–129)
MCH RBC QN AUTO: 31.8 PG (ref 27–31.3)
MCHC RBC AUTO-ENTMCNC: 33.8 % (ref 33–37)
MCV RBC AUTO: 94 FL (ref 80–100)
PDW BLD-RTO: 12.7 % (ref 11.5–14.5)
PLATELET # BLD: 372 K/UL (ref 130–400)
POTASSIUM SERPL-SCNC: 4.5 MEQ/L (ref 3.4–4.9)
RBC # BLD: 4.78 M/UL (ref 4.7–6.1)
SODIUM BLD-SCNC: 139 MEQ/L (ref 135–144)
TOTAL PROTEIN: 6.8 G/DL (ref 6.3–8)
TRIGL SERPL-MCNC: 95 MG/DL (ref 0–150)
WBC # BLD: 12.4 K/UL (ref 4.8–10.8)

## 2020-07-23 ENCOUNTER — TELEPHONE (OUTPATIENT)
Dept: FAMILY MEDICINE CLINIC | Age: 70
End: 2020-07-23

## 2020-10-09 RX ORDER — TRAZODONE HYDROCHLORIDE 50 MG/1
TABLET ORAL
Qty: 30 TABLET | Refills: 5 | Status: SHIPPED | OUTPATIENT
Start: 2020-10-09 | End: 2021-10-06 | Stop reason: CLARIF

## 2020-10-09 NOTE — TELEPHONE ENCOUNTER
Patient requesting medication refill. Please approve or deny this request.    Rx requested:  Requested Prescriptions     Pending Prescriptions Disp Refills    traZODone (DESYREL) 50 MG tablet 30 tablet 5     Si.5 tab po at night prn insomnia         Last Office Visit:   3/2/2020      Next Visit Date:  No future appointments.

## 2021-04-05 ENCOUNTER — OFFICE VISIT (OUTPATIENT)
Dept: FAMILY MEDICINE CLINIC | Age: 71
End: 2021-04-05
Payer: MEDICARE

## 2021-04-05 VITALS
RESPIRATION RATE: 18 BRPM | HEIGHT: 67 IN | HEART RATE: 89 BPM | TEMPERATURE: 97.5 F | BODY MASS INDEX: 32.18 KG/M2 | SYSTOLIC BLOOD PRESSURE: 128 MMHG | DIASTOLIC BLOOD PRESSURE: 64 MMHG | OXYGEN SATURATION: 98 % | WEIGHT: 205 LBS

## 2021-04-05 DIAGNOSIS — Z12.5 SCREENING FOR PROSTATE CANCER: ICD-10-CM

## 2021-04-05 DIAGNOSIS — R81 GLUCOSURIA: ICD-10-CM

## 2021-04-05 DIAGNOSIS — G56.01 CARPAL TUNNEL SYNDROME OF RIGHT WRIST: ICD-10-CM

## 2021-04-05 DIAGNOSIS — Z91.81 AT HIGH RISK FOR FALLS: ICD-10-CM

## 2021-04-05 DIAGNOSIS — M54.50 LUMBAR PAIN: Primary | ICD-10-CM

## 2021-04-05 LAB
BILIRUBIN, POC: NORMAL
BLOOD URINE, POC: NORMAL
CLARITY, POC: CLEAR
COLOR, POC: YELLOW
GLUCOSE URINE, POC: NORMAL
HBA1C MFR BLD: 5.4 %
KETONES, POC: NORMAL
LEUKOCYTE EST, POC: NORMAL
NITRITE, POC: NORMAL
PH, POC: 6
PROSTATE SPECIFIC ANTIGEN: 0.99 NG/ML (ref 0–6.22)
PROTEIN, POC: NORMAL
SPECIFIC GRAVITY, POC: 1.01
UROBILINOGEN, POC: NORMAL

## 2021-04-05 PROCEDURE — 1123F ACP DISCUSS/DSCN MKR DOCD: CPT | Performed by: FAMILY MEDICINE

## 2021-04-05 PROCEDURE — 81002 URINALYSIS NONAUTO W/O SCOPE: CPT | Performed by: FAMILY MEDICINE

## 2021-04-05 PROCEDURE — 99214 OFFICE O/P EST MOD 30 MIN: CPT | Performed by: FAMILY MEDICINE

## 2021-04-05 PROCEDURE — 96372 THER/PROPH/DIAG INJ SC/IM: CPT | Performed by: FAMILY MEDICINE

## 2021-04-05 PROCEDURE — 1036F TOBACCO NON-USER: CPT | Performed by: FAMILY MEDICINE

## 2021-04-05 PROCEDURE — G8427 DOCREV CUR MEDS BY ELIG CLIN: HCPCS | Performed by: FAMILY MEDICINE

## 2021-04-05 PROCEDURE — 4040F PNEUMOC VAC/ADMIN/RCVD: CPT | Performed by: FAMILY MEDICINE

## 2021-04-05 PROCEDURE — G8417 CALC BMI ABV UP PARAM F/U: HCPCS | Performed by: FAMILY MEDICINE

## 2021-04-05 PROCEDURE — 3017F COLORECTAL CA SCREEN DOC REV: CPT | Performed by: FAMILY MEDICINE

## 2021-04-05 RX ORDER — ETODOLAC 400 MG/1
400 TABLET, FILM COATED ORAL 2 TIMES DAILY
Qty: 60 TABLET | Refills: 0 | Status: SHIPPED | OUTPATIENT
Start: 2021-04-05 | End: 2021-10-06 | Stop reason: CLARIF

## 2021-04-05 RX ORDER — HYDROCODONE BITARTRATE AND ACETAMINOPHEN 5; 325 MG/1; MG/1
1 TABLET ORAL EVERY 6 HOURS PRN
Qty: 10 TABLET | Refills: 0 | Status: SHIPPED | OUTPATIENT
Start: 2021-04-05 | End: 2021-04-08

## 2021-04-05 RX ORDER — KETOROLAC TROMETHAMINE 30 MG/ML
60 INJECTION, SOLUTION INTRAMUSCULAR; INTRAVENOUS ONCE
Status: COMPLETED | OUTPATIENT
Start: 2021-04-05 | End: 2021-04-05

## 2021-04-05 RX ORDER — TIZANIDINE 4 MG/1
4 TABLET ORAL 3 TIMES DAILY PRN
Qty: 30 TABLET | Refills: 0 | Status: SHIPPED | OUTPATIENT
Start: 2021-04-05 | End: 2021-06-10 | Stop reason: ALTCHOICE

## 2021-04-05 RX ADMIN — KETOROLAC TROMETHAMINE 60 MG: 30 INJECTION, SOLUTION INTRAMUSCULAR; INTRAVENOUS at 12:32

## 2021-04-05 SDOH — ECONOMIC STABILITY: TRANSPORTATION INSECURITY
IN THE PAST 12 MONTHS, HAS THE LACK OF TRANSPORTATION KEPT YOU FROM MEDICAL APPOINTMENTS OR FROM GETTING MEDICATIONS?: NO

## 2021-04-05 SDOH — ECONOMIC STABILITY: FOOD INSECURITY: WITHIN THE PAST 12 MONTHS, YOU WORRIED THAT YOUR FOOD WOULD RUN OUT BEFORE YOU GOT MONEY TO BUY MORE.: NEVER TRUE

## 2021-04-05 SDOH — ECONOMIC STABILITY: TRANSPORTATION INSECURITY
IN THE PAST 12 MONTHS, HAS LACK OF TRANSPORTATION KEPT YOU FROM MEETINGS, WORK, OR FROM GETTING THINGS NEEDED FOR DAILY LIVING?: NO

## 2021-04-05 ASSESSMENT — PATIENT HEALTH QUESTIONNAIRE - PHQ9
SUM OF ALL RESPONSES TO PHQ QUESTIONS 1-9: 0
SUM OF ALL RESPONSES TO PHQ QUESTIONS 1-9: 0
2. FEELING DOWN, DEPRESSED OR HOPELESS: 0
SUM OF ALL RESPONSES TO PHQ QUESTIONS 1-9: 0

## 2021-04-05 NOTE — PROGRESS NOTES
Patient: Hayden Dennison    YOB: 1950    Date: 4/5/21    Chief Complaint   Patient presents with    Back Pain     Pt is here today for low back pain that radiates into the legs bilaterally, 9/10 sharp pain that is on and off, worse when going from sitting to standing x 1 week, tylenol taken with mild relief       Patient Active Problem List    Diagnosis Date Noted    Carpal tunnel syndrome 10/22/2018    Dyshidrotic eczema 08/23/2018    Varicose veins of both lower extremities 07/31/2018    Essential hypertension 11/16/2016    Local neurodermatitis 08/11/2016    Keratosis     CAD (coronary artery disease) of artery bypass graft 01/31/2012    Anxiety     Hyperlipidemia     Sleep apnea        No Known Allergies    Vitals:    04/05/21 1103   BP: 128/64   Pulse: 89   Resp: 18   Temp: 97.5 °F (36.4 °C)   TempSrc: Oral   SpO2: 98%   Weight: 205 lb (93 kg)   Height: 5' 7\" (1.702 m)     Body mass index is 32.11 kg/m². HPI    He comes in with a little over 1 week history of low back pain. He says he has this every year and this time he has no idea what caused it. He did fall earlier in the year by slipping on the ice and hitting his head but he has been fine since then without injury. He was not knocked unconscious. He has no numbness or tingling but he reports that the pain does radiate almost to his knees equally and bilaterally. He is otherwise well he has been following up with his cardiologist regularly. He normally gets his blood work done by his cardiologist on a regular basis. No family history of diabetes but he has been told in the past that he is borderline diabetic. He did get his Covid vaccines    Review of Systems he has    Constitutional: Negative for fatigue, fever and sweats. HEENT: Negative for eye discharge and vision loss. Negative for ear drainage, hearing loss and nasal drainage. Respiratory: Negative for cough, dyspnea and wheezing.   Cardiovascular:  Negative for chest pain, claudication and irregular heartbeat/palpitations. Gastrointestinal: Negative for abdominal pain, nausea, vomiting, constipation and diarrhea. Genitourinary: No dysuria or penile discharge. Metabolic/Endocrine: Negative for cold intolerance, heat intolerance, polydipsia and polyphagia. No unintended weight loss or gain. Neuro/Psychiatric: Negative for gait disturbance. Negative for psychiatric symptoms. Dermatologic: Negative for pruritus and rash. Musculoskeletal: Negative for bone/joint symptoms. No numbness or tingling. No weakness. Hematology: Negative for bleeding and easy bruising. Immunology:  Negative for environmental allergies and food allergies. Physical Exam    Patient's medication, allergies, past medical, surgical, social and family histories were reviewed and updated as appropriate. PHYSICAL EXAM     General: Alert oriented pleasant cooperative no acute distress  Lungs: Clear to auscultation without wheezes rhonchi rales  Heart: Regular rate and rhythm without murmurs rubs or gallops  Back: Some tenderness at about L1-L2 bilaterally equal just paraspinally negative straight leg raises difficult to elicit any deep tendon reflexes in his lower legs today intact sensation normal gait. Assessment:   Diagnosis Orders   1. Glucosuria  POCT glycosylated hemoglobin (Hb A1C) 5.4 today \"find that this glucose in his urine is a false positive    POCT Urinalysis no Micro   2. Lumbar pain  XR LUMBAR SPINE (MIN 4 VIEWS) he also received Toradol 60 mg IM today    HYDROcodone-acetaminophen (NORCO) 5-325 MG per tablet    etodolac (LODINE) 400 MG tablet   3. Carpal tunnel syndrome of right wrist   resolved   4. Screening for prostate cancer  PSA Screening   5.  At high risk for falls   patient declines additional assessment         On this date 04/05/21 I have spent 32 minutes reviewing previous notes, test results and face to face with the patient discussing the diagnosis and importance of compliance with the treatment plan. Plan:  Current Outpatient Medications   Medication Sig Dispense Refill    HYDROcodone-acetaminophen (NORCO) 5-325 MG per tablet Take 1 tablet by mouth every 6 hours as needed for Pain for up to 3 days. Intended supply: 3 days. Take lowest dose possible to manage pain 10 tablet 0    tiZANidine (ZANAFLEX) 4 MG tablet Take 1 tablet by mouth 3 times daily as needed (back pain) 30 tablet 0    etodolac (LODINE) 400 MG tablet Take 1 tablet by mouth 2 times daily 60 tablet 0    traZODone (DESYREL) 50 MG tablet 0.5 tab po at night prn insomnia 30 tablet 5    nitroGLYCERIN (NITROSTAT) 0.4 MG SL tablet DISSOLVE 1 TABLET UNDER THE TONGUE AS NEEDED FOR CHEST PAIN- MAY REPEAT EVERY 5 MINUTES IF NEEDED ( MAX 3 DOSES.- IF NO RELIEF CALL 911)      metoprolol succinate (TOPROL XL) 50 MG extended release tablet TAKE 1 TABLET BY MOUTH EVERY DAY 90 tablet 3    lisinopril (PRINIVIL;ZESTRIL) 20 MG tablet Take 1 tablet by mouth 2 times daily. 180 tablet 3    atorvastatin (LIPITOR) 80 MG tablet Take 1 tablet by mouth daily. 90 tablet 3    aspirin 81 MG EC tablet Take 81 mg by mouth daily.  cephALEXin (KEFLEX) 500 MG capsule Take 1 capsule by mouth 4 times daily (Patient not taking: Reported on 4/5/2021) 40 capsule 0    fluocinolone (SYNALAR) 0.025 % cream Apply topically 2 times daily. (Patient not taking: Reported on 4/5/2021) 120 g 0     No current facility-administered medications for this visit.       Orders Placed This Encounter   Procedures    XR LUMBAR SPINE (MIN 4 VIEWS)     Standing Status:   Future     Number of Occurrences:   1     Standing Expiration Date:   4/5/2022    PSA Screening     Standing Status:   Future     Number of Occurrences:   1     Standing Expiration Date:   4/5/2022    POCT glycosylated hemoglobin (Hb A1C)    POCT Urinalysis no Micro       Orders Placed This Encounter   Medications    HYDROcodone-acetaminophen (NORCO) 5-325 MG per tablet     Sig: Take 1 tablet by mouth every 6 hours as needed for Pain for up to 3 days. Intended supply: 3 days. Take lowest dose possible to manage pain     Dispense:  10 tablet     Refill:  0     Reduce doses taken as pain becomes manageable    tiZANidine (ZANAFLEX) 4 MG tablet     Sig: Take 1 tablet by mouth 3 times daily as needed (back pain)     Dispense:  30 tablet     Refill:  0    etodolac (LODINE) 400 MG tablet     Sig: Take 1 tablet by mouth 2 times daily     Dispense:  60 tablet     Refill:  0              Return in about 6 months (around 10/5/2021). An electronic signature was used to authenticate this note.   Dr. Jacklyn Kaminski      4/5/21  12:17 PM

## 2021-04-12 ENCOUNTER — NURSE TRIAGE (OUTPATIENT)
Dept: OTHER | Facility: CLINIC | Age: 71
End: 2021-04-12

## 2021-04-12 ENCOUNTER — OFFICE VISIT (OUTPATIENT)
Dept: FAMILY MEDICINE CLINIC | Age: 71
End: 2021-04-12
Payer: MEDICARE

## 2021-04-12 VITALS
SYSTOLIC BLOOD PRESSURE: 164 MMHG | RESPIRATION RATE: 16 BRPM | WEIGHT: 203 LBS | OXYGEN SATURATION: 98 % | BODY MASS INDEX: 31.86 KG/M2 | HEIGHT: 67 IN | TEMPERATURE: 98 F | HEART RATE: 88 BPM | DIASTOLIC BLOOD PRESSURE: 90 MMHG

## 2021-04-12 DIAGNOSIS — M47.816 OSTEOARTHRITIS OF LUMBAR SPINE, UNSPECIFIED SPINAL OSTEOARTHRITIS COMPLICATION STATUS: ICD-10-CM

## 2021-04-12 DIAGNOSIS — M54.50 LUMBAR PAIN: ICD-10-CM

## 2021-04-12 DIAGNOSIS — M51.26 HERNIATED INTERVERTEBRAL DISC OF LUMBAR SPINE: Primary | ICD-10-CM

## 2021-04-12 PROCEDURE — 4040F PNEUMOC VAC/ADMIN/RCVD: CPT | Performed by: FAMILY MEDICINE

## 2021-04-12 PROCEDURE — 1123F ACP DISCUSS/DSCN MKR DOCD: CPT | Performed by: FAMILY MEDICINE

## 2021-04-12 PROCEDURE — 99213 OFFICE O/P EST LOW 20 MIN: CPT | Performed by: FAMILY MEDICINE

## 2021-04-12 PROCEDURE — 3017F COLORECTAL CA SCREEN DOC REV: CPT | Performed by: FAMILY MEDICINE

## 2021-04-12 PROCEDURE — G8427 DOCREV CUR MEDS BY ELIG CLIN: HCPCS | Performed by: FAMILY MEDICINE

## 2021-04-12 PROCEDURE — G8417 CALC BMI ABV UP PARAM F/U: HCPCS | Performed by: FAMILY MEDICINE

## 2021-04-12 PROCEDURE — 1036F TOBACCO NON-USER: CPT | Performed by: FAMILY MEDICINE

## 2021-04-12 RX ORDER — HYDROCODONE BITARTRATE AND ACETAMINOPHEN 5; 325 MG/1; MG/1
1 TABLET ORAL EVERY 6 HOURS PRN
Qty: 28 TABLET | Refills: 0 | Status: SHIPPED | OUTPATIENT
Start: 2021-04-12 | End: 2021-04-19

## 2021-04-12 RX ORDER — HYDROCODONE BITARTRATE AND ACETAMINOPHEN 5; 325 MG/1; MG/1
1 TABLET ORAL EVERY 6 HOURS PRN
COMMUNITY
End: 2021-04-12 | Stop reason: SDUPTHER

## 2021-04-12 NOTE — TELEPHONE ENCOUNTER
Received call from Danna  at [facility])  with Red Flag Complaint. Brief description of triage: Saw PCP last week for back pain- anti inflammatory and pain pills. Pain is not better. Triage indicates for patient to be seen today. This writer did recommend [facility] or the ED for immediate assistance if no PCP appointments available today. Care advice provided, patient verbalizes understanding; denies any other questions or concerns; instructed to call back for any new or worsening symptoms. Writer provided warm transfer to Rosario Wolfe   at [facility] for appointment scheduling. Attention Provider: Thank you for allowing me to participate in the care of your patient. The patient was connected to triage in response to information provided to the ECC. Please do not respond through this encounter as the response is not directed to a shared pool. Reason for Disposition   SEVERE back pain (e.g., excruciating, unable to do any normal activities) and not improved after pain medicine and CARE ADVICE    Answer Assessment - Initial Assessment Questions  1. ONSET: \"When did the pain begin? \"         2 weeks ago     2. LOCATION: \"Where does it hurt? \" (upper, mid or lower back)       Lower back pain- seen last week for the same issue    3. SEVERITY: \"How bad is the pain? \"  (e.g., Scale 1-10; mild, moderate, or severe)    - MILD (1-3): doesn't interfere with normal activities     - MODERATE (4-7): interferes with normal activities or awakens from sleep     - SEVERE (8-10): excruciating pain, unable to do any normal activities        10-11/10per the patient    4. PATTERN: \"Is the pain constant? \" (e.g., yes, no; constant, intermittent)         Constant pain    5. RADIATION: \"Does the pain shoot into your legs or elsewhere? \"        Both legs with left being main leg affected    6. CAUSE:  \"What do you think is causing the back pain? \"         Unknown    7.  BACK OVERUSE:  Janie rPado recent lifting of heavy objects, strenuous work or exercise? \"        Denies    8. MEDICATIONS: \"What have you taken so far for the pain? \" (e.g., nothing, acetaminophen, NSAIDS)        Pain pills and anti inflammatory prescribed last week for the same issue    9. NEUROLOGIC SYMPTOMS: \"Do you have any weakness, numbness, or problems with bowel/bladder control? \"        Denies    10. OTHER SYMPTOMS: \"Do you have any other symptoms? \" (e.g., fever, abdominal pain, burning with urination, blood in urine)         Denies all  11. PREGNANCY: \"Is there any chance you are pregnant? \" (e.g., yes, no; LMP)        M/a male    Protocols used: BACK PAIN-ADULT-[place]  See above documentation

## 2021-04-12 NOTE — PROGRESS NOTES
Patient: Dina Perea (: 1950) is a 73 y.o. male,Established patient, here for evaluation of the following chief complaint(s):  Chief Complaint   Patient presents with    Back Pain     Lumbar pain. He is still having lower back pain, pain radiates down both legs. Date: 21    No Known Allergies    Vitals:    21 1118 21 1125   BP: (!) 168/100 (!) 164/90   Site: Left Upper Arm Left Upper Arm   Position: Sitting Sitting   Cuff Size: Large Adult Large Adult   Pulse: 88    Resp: 16    Temp: 98 °F (36.7 °C)    TempSrc: Oral    SpO2: 98%    Weight: 203 lb (92.1 kg)    Height: 5' 7\" (1.702 m)      Body mass index is 31.79 kg/m². PHQ Scores 2021 3/2/2020 2019 2018 10/13/2017 2016   PHQ2 Score 0 0 0 0 0 0   PHQ9 Score 0 0 0 0 0 0     Interpretation of Total Score Depression Severity: 1-4 = Minimal depression, 5-9 = Mild depression, 10-14 = Moderate depression, 15-19 = Moderately severe depression, 20-27 = Severe depression    HPI    He comes in today because his back is no better if anything gets worse. He says is really weird [place] times when there is no pain at all such as as she was driving over here or sitting in the waiting room that he goes to stand up and the pain is so severe that it rockets down his legs more in the left than the right and he feels like he is going to fall down. No problems with bowels or bladder no fever no chills. The x-ray showed a lot of arthritis no fractures or tumors. Medications not really helping even the oxycodone    Review of Systems    Constitutional: Negative for fatigue, fever and sweats. HEENT: Negative for eye discharge and vision loss. Negative for ear drainage, hearing loss and nasal drainage. Respiratory: Negative for cough, dyspnea and wheezing. Cardiovascular:  Negative for chest pain, claudication and irregular heartbeat/palpitations.   Gastrointestinal: Negative for abdominal pain, nausea, vomiting, constipation and diarrhea. Genitourinary: No dysuria or penile discharge. Metabolic/Endocrine: Negative for cold intolerance, heat intolerance, polydipsia and polyphagia. No unintended weight loss or gain. Neuro/Psychiatric: Negative for gait disturbance. Negative for psychiatric symptoms. Dermatologic: Negative for pruritus and rash. Musculoskeletal: positive for bone/joint symptoms. No numbness or tingling. No weakness. Physical Exam    Patient's medication, allergies, past medical, surgical, social and family histories were reviewed and updated as appropriate. PHYSICAL EXAM     General: Alert oriented pleasant cooperative in no acute lungs: Clear to auscultation  Heart: Regular rate and rhythm  Back: No pain to palpation of back positive straight leg raises especially on the left and patient could not stand maintain standing without his legs weakening because of pain              Assessment:   Diagnosis Orders   1. Herniated intervertebral disc of lumbar spine  MRI LUMBAR SPINE WO CONTRAST    Ambulatory referral to Pain Clinic    HYDROcodone-acetaminophen (NORCO) 5-325 MG per tablet   2. Lumbar pain  Ambulatory referral to Pain Clinic    HYDROcodone-acetaminophen (NORCO) 5-325 MG per tablet   3. Osteoarthritis of lumbar spine, unspecified spinal osteoarthritis complication status       Get an MRI to make sure he does not have a radiculopathy from like over to [facility] for pain evaluation and possible surgery pending the out of the MRI    On this date 04/12/21 I have spent 22 minutes reviewing previous notes, test results and face to face with the patient discussing the diagnosis and importance of compliance with the treatment plan. Plan:  Current Outpatient Medications   Medication Sig Dispense Refill    HYDROcodone-acetaminophen (NORCO) 5-325 MG per tablet Take 1 tablet by mouth every 6 hours as needed for Pain for up to 7 days.  28 tablet 0    Handicap Placard MISC by Does not apply route Applying form Negative for bleeding and easy bruising. Immunology:  Negative for environmental allergies and food allergies.

## 2021-04-24 PROBLEM — F17.200 SMOKER: Status: ACTIVE | Noted: 2021-04-24

## 2021-04-24 PROBLEM — M51.26 HERNIATED NUCLEUS PULPOSUS, LUMBAR: Status: ACTIVE | Noted: 2021-04-24

## 2021-04-24 PROBLEM — M48.062 SPINAL STENOSIS OF LUMBAR REGION WITH NEUROGENIC CLAUDICATION: Status: ACTIVE | Noted: 2021-04-24

## 2021-04-30 ENCOUNTER — OFFICE VISIT (OUTPATIENT)
Dept: FAMILY MEDICINE CLINIC | Age: 71
End: 2021-04-30
Payer: MEDICARE

## 2021-04-30 VITALS
BODY MASS INDEX: 31.86 KG/M2 | HEART RATE: 75 BPM | DIASTOLIC BLOOD PRESSURE: 84 MMHG | TEMPERATURE: 97.6 F | WEIGHT: 203 LBS | HEIGHT: 67 IN | OXYGEN SATURATION: 97 % | SYSTOLIC BLOOD PRESSURE: 134 MMHG

## 2021-04-30 DIAGNOSIS — Z01.818 PRE-OP EXAMINATION: Primary | ICD-10-CM

## 2021-04-30 DIAGNOSIS — Z01.818 PRE-OP EXAMINATION: ICD-10-CM

## 2021-04-30 LAB
ANION GAP SERPL CALCULATED.3IONS-SCNC: 17 MEQ/L (ref 9–15)
APTT: 25.9 SEC (ref 24.4–36.8)
BUN BLDV-MCNC: 36 MG/DL (ref 8–23)
CALCIUM SERPL-MCNC: 8.6 MG/DL (ref 8.5–9.9)
CHLORIDE BLD-SCNC: 102 MEQ/L (ref 95–107)
CO2: 20 MEQ/L (ref 20–31)
CREAT SERPL-MCNC: 1.09 MG/DL (ref 0.7–1.2)
GFR AFRICAN AMERICAN: >60
GFR NON-AFRICAN AMERICAN: >60
GLUCOSE BLD-MCNC: 112 MG/DL (ref 70–99)
HCT VFR BLD CALC: 46.6 % (ref 42–52)
HEMOGLOBIN: 15.6 G/DL (ref 14–18)
INR BLD: 1
MCH RBC QN AUTO: 31.7 PG (ref 27–31.3)
MCHC RBC AUTO-ENTMCNC: 33.4 % (ref 33–37)
MCV RBC AUTO: 95 FL (ref 80–100)
PDW BLD-RTO: 13.6 % (ref 11.5–14.5)
PLATELET # BLD: 428 K/UL (ref 130–400)
POTASSIUM SERPL-SCNC: 5.6 MEQ/L (ref 3.4–4.9)
PROTHROMBIN TIME: 13.5 SEC (ref 12.3–14.9)
RBC # BLD: 4.91 M/UL (ref 4.7–6.1)
SODIUM BLD-SCNC: 139 MEQ/L (ref 135–144)
WBC # BLD: 15.9 K/UL (ref 4.8–10.8)

## 2021-04-30 PROCEDURE — G8427 DOCREV CUR MEDS BY ELIG CLIN: HCPCS | Performed by: PHYSICIAN ASSISTANT

## 2021-04-30 PROCEDURE — 99213 OFFICE O/P EST LOW 20 MIN: CPT | Performed by: PHYSICIAN ASSISTANT

## 2021-04-30 PROCEDURE — G8417 CALC BMI ABV UP PARAM F/U: HCPCS | Performed by: PHYSICIAN ASSISTANT

## 2021-04-30 RX ORDER — SODIUM POLYSTYRENE SULFONATE 15 G/60ML
15 SUSPENSION ORAL; RECTAL ONCE
Qty: 1 BOTTLE | Refills: 1 | Status: SHIPPED | OUTPATIENT
Start: 2021-04-30 | End: 2021-10-06 | Stop reason: ALTCHOICE

## 2021-04-30 NOTE — PROGRESS NOTES
Preoperative Consultation      Logan Lemos  YOB: 1950    Date of Service:  4/30/2021    Vitals:    04/30/21 1008   BP: 134/84   Site: Right Upper Arm   Position: Sitting   Cuff Size: Large Adult   Pulse: 75   Temp: 97.6 °F (36.4 °C)   SpO2: 97%   Weight: 203 lb (92.1 kg)   Height: 5' 7\" (1.702 m)      Wt Readings from Last 2 Encounters:   04/30/21 203 lb (92.1 kg)   04/24/21 203 lb (92.1 kg)     BP Readings from Last 3 Encounters:   04/30/21 134/84   04/12/21 (!) 164/90   04/05/21 128/64        Chief Complaint   Patient presents with   Yuliet Mcdonald Pre-op Exam     Pt is here for pre-op examination. No Known Allergies  Outpatient Medications Marked as Taking for the 4/30/21 encounter (Office Visit) with JAE Rasheed   Medication Sig Dispense Refill    HYDROcodone-acetaminophen (NORCO) 5-325 MG per tablet Take 1 tablet by mouth 3 times daily as needed for Pain for up to 15 days. Intended supply: 30 days 45 tablet 0    Handicap Placard MISC by Does not apply route Applying form 4/12/2021 -4/11/2022. 1 each 0    tiZANidine (ZANAFLEX) 4 MG tablet Take 1 tablet by mouth 3 times daily as needed (back pain) 30 tablet 0    etodolac (LODINE) 400 MG tablet Take 1 tablet by mouth 2 times daily 60 tablet 0    traZODone (DESYREL) 50 MG tablet 0.5 tab po at night prn insomnia 30 tablet 5    nitroGLYCERIN (NITROSTAT) 0.4 MG SL tablet DISSOLVE 1 TABLET UNDER THE TONGUE AS NEEDED FOR CHEST PAIN- MAY REPEAT EVERY 5 MINUTES IF NEEDED ( MAX 3 DOSES.- IF NO RELIEF CALL 911)      metoprolol succinate (TOPROL XL) 50 MG extended release tablet TAKE 1 TABLET BY MOUTH EVERY DAY 90 tablet 3    lisinopril (PRINIVIL;ZESTRIL) 20 MG tablet Take 1 tablet by mouth 2 times daily. 180 tablet 3    atorvastatin (LIPITOR) 80 MG tablet Take 1 tablet by mouth daily. 90 tablet 3    aspirin 81 MG EC tablet Take 81 mg by mouth daily.            This patient presents to the office today for a preoperative consultation at the request of surgeon, Dr. Abena Chen, who plans on performing L 3-4 bilateral Microdecompression on April 5 at [facility]. The current problem began 4 years ago, and symptoms have been worsening with time.   Conservative therapy: No.    Planned anesthesia: General   Known anesthesia problems: None   Bleeding risk: No recent or remote history of abnormal bleeding  Personal or FH of DVT/PE: No    Patient objection to receiving blood products: No    Patient Active Problem List   Diagnosis    Anxiety    Hyperlipidemia    Sleep apnea    CAD (coronary artery disease) of artery bypass graft    Keratosis    Local neurodermatitis    Essential hypertension    Varicose veins of both lower extremities    Dyshidrotic eczema    Carpal tunnel syndrome    Spinal stenosis of lumbar region with neurogenic claudication    Herniated nucleus pulposus, lumbar    Smoker       Past Medical History:   Diagnosis Date    Anxiety     CAD (coronary artery disease) 2012    had CABG    HIGH CHOLESTEROL     Hypercholesterolemia     Hyperlipidemia     Hypertension     Keratosis     Neuropathy     Psoriasis     Sleep apnea      Past Surgical History:   Procedure Laterality Date    CARDIAC SURGERY      CARPAL TUNNEL RELEASE Left     COLONOSCOPY  9-10-12    dr Benoit Randall /2 polyps, diverticulosis, int hemmorrhoids    CORONARY ARTERY BYPASS GRAFT  1/9/12    CABG X 3    [place] REVISE MEDIAN N/CARPAL TUNNEL SURG Right 10/30/2018    RIGHT WRIST CARPAL TUNNEL RELEASE SUPINE performed by Kiel Saini MD at [facility]     Family History   Problem Relation Age of Onset    Coronary Art Dis Mother     Heart Failure Mother     Heart Disease Mother     Heart Surgery Sister     Cancer Sister         breast    Heart Disease Sister     Heart Disease Father     Heart Disease Brother      Social History     Socioeconomic History    Marital status:      Spouse name: Not on present. Carotid bruit is not present. No mass and no thyromegaly present. Cardiovascular: Normal rate, regular rhythm, normal heart sounds and intact distal pulses. Exam reveals no gallop and no friction rub. No murmur heard. Pulmonary/Chest: Effort normal and breath sounds normal. No respiratory distress. He has no wheezes. He has no rales. Abdominal: Soft. Normal aorta and bowel sounds are normal. He exhibits no distension and no mass. There is no hepatosplenomegaly. No tenderness. Musculoskeletal: He exhibits no edema and no tenderness. Neurological: He is alert and oriented to person, place, and time. He has normal strength. No cranial nerve deficit or sensory deficit. Coordination and gait normal.   Skin: Skin is warm and dry. No rash noted. No erythema. Psychiatric: He has a normal mood and affect. His behavior is normal.     EKG Interpretation:  normal EKG, normal sinus rhythm, unchanged from previous tracings. EKG performed by Dr. Eliud Hunter with cardiac clearance    Lab Review   Orders Only on 04/05/2021   Component Date Value    PSA 04/05/2021 0.99    Office Visit on 04/05/2021   Component Date Value    Hemoglobin A1C 04/05/2021 5.4     Color, UA 04/05/2021 yellow     Clarity, UA 04/05/2021 clear     Glucose, UA POC 04/05/2021 1+ 15mmol/l     Bilirubin, UA 04/05/2021 -     Ketones, UA 04/05/2021 -     Spec Grav, UA 04/05/2021 1.010     Blood, UA POC 04/05/2021 -     pH, UA 04/05/2021 6.0     Protein, UA POC 04/05/2021 -     Urobilinogen, UA 04/05/2021 -     Leukocytes, UA 04/05/2021 -     Nitrite, UA 04/05/2021 -            Assessment:       73 y.o. patient with planned surgery as above. Known risk factors for perioperative complications: Coronary artery disease, Hypertension  Current medications which may produce withdrawal symptoms if withheld perioperatively: none      Plan:     1.  Preoperative workup as follows: ECG, hemoglobin, hematocrit, electrolytes, creatinine, glucose, coagulation studies  2. Change in medication regimen before surgery: Take troprol xl on morning of surgery with sip of water, and hold all other medications until after surgery  3. Prophylaxis for cardiac events with perioperative beta-blockers: Currently taking  metoprolol  ACC/AHA indications for pre-operative beta-blocker use:    · Vascular surgery with history of postitive stress test  · Intermediate or high risk surgery with history of CAD   · Intermediate or high risk surgery with multiple clinical predictors of CAD- 2 of the following: history of compensated or prior heart failure, history of cerebrovascular disease, DM, or renal insufficiency    Routine administration of higher-dose, long-acting metoprolol in beta-blockernaïve patients on the day of surgery, and in the absence of dose titration is associated with an overall increase in mortality. Beta-blockers should be started days to weeks prior to surgery and titrated to pulse < 70.  4. Deep vein thrombosis prophylaxis: regimen to be chosen by surgical team  5. Please review above information for medical clearance.

## 2021-05-01 LAB
SARS-COV-2: NOT DETECTED
SOURCE: NORMAL

## 2021-05-04 ENCOUNTER — ANESTHESIA EVENT (OUTPATIENT)
Dept: OPERATING ROOM | Age: 71
DRG: 520 | End: 2021-05-04
Payer: MEDICARE

## 2021-05-04 ASSESSMENT — LIFESTYLE VARIABLES: SMOKING_STATUS: 1

## 2021-05-05 ENCOUNTER — HOSPITAL ENCOUNTER (INPATIENT)
Age: 71
LOS: 1 days | Discharge: HOME | DRG: 520 | End: 2021-05-07
Attending: NEUROLOGICAL SURGERY | Admitting: NEUROLOGICAL SURGERY
Payer: MEDICARE

## 2021-05-05 ENCOUNTER — APPOINTMENT (OUTPATIENT)
Dept: GENERAL RADIOLOGY | Age: 71
DRG: 520 | End: 2021-05-05
Attending: NEUROLOGICAL SURGERY
Payer: MEDICARE

## 2021-05-05 ENCOUNTER — ANESTHESIA (OUTPATIENT)
Dept: OPERATING ROOM | Age: 71
DRG: 520 | End: 2021-05-05
Payer: MEDICARE

## 2021-05-05 VITALS — SYSTOLIC BLOOD PRESSURE: 109 MMHG | DIASTOLIC BLOOD PRESSURE: 59 MMHG | OXYGEN SATURATION: 99 %

## 2021-05-05 PROBLEM — M48.062 LUMBAR STENOSIS WITH NEUROGENIC CLAUDICATION: Status: ACTIVE | Noted: 2021-05-05

## 2021-05-05 LAB
AMPHETAMINE SCREEN, URINE: ABNORMAL
BARBITURATE SCREEN URINE: ABNORMAL
BENZODIAZEPINE SCREEN, URINE: ABNORMAL
CANNABINOID SCREEN URINE: ABNORMAL
COCAINE METABOLITE SCREEN URINE: ABNORMAL
Lab: ABNORMAL
METHADONE SCREEN, URINE: ABNORMAL
OPIATE SCREEN URINE: POSITIVE
OXYCODONE URINE: ABNORMAL
PHENCYCLIDINE SCREEN URINE: ABNORMAL
POTASSIUM SERPL-SCNC: 4.7 MEQ/L (ref 3.4–4.9)
PROPOXYPHENE SCREEN: ABNORMAL

## 2021-05-05 PROCEDURE — 6370000000 HC RX 637 (ALT 250 FOR IP): Performed by: NEUROLOGICAL SURGERY

## 2021-05-05 PROCEDURE — 6360000002 HC RX W HCPCS: Performed by: NEUROLOGICAL SURGERY

## 2021-05-05 PROCEDURE — 2580000003 HC RX 258: Performed by: STUDENT IN AN ORGANIZED HEALTH CARE EDUCATION/TRAINING PROGRAM

## 2021-05-05 PROCEDURE — 7100000001 HC PACU RECOVERY - ADDTL 15 MIN: Performed by: NEUROLOGICAL SURGERY

## 2021-05-05 PROCEDURE — 80307 DRUG TEST PRSMV CHEM ANLYZR: CPT

## 2021-05-05 PROCEDURE — 2500000003 HC RX 250 WO HCPCS: Performed by: STUDENT IN AN ORGANIZED HEALTH CARE EDUCATION/TRAINING PROGRAM

## 2021-05-05 PROCEDURE — 3600000014 HC SURGERY LEVEL 4 ADDTL 15MIN: Performed by: NEUROLOGICAL SURGERY

## 2021-05-05 PROCEDURE — 2580000003 HC RX 258: Performed by: NEUROLOGICAL SURGERY

## 2021-05-05 PROCEDURE — 76942 ECHO GUIDE FOR BIOPSY: CPT | Performed by: STUDENT IN AN ORGANIZED HEALTH CARE EDUCATION/TRAINING PROGRAM

## 2021-05-05 PROCEDURE — 2500000003 HC RX 250 WO HCPCS: Performed by: NURSE ANESTHETIST, CERTIFIED REGISTERED

## 2021-05-05 PROCEDURE — 3600000004 HC SURGERY LEVEL 4 BASE: Performed by: NEUROLOGICAL SURGERY

## 2021-05-05 PROCEDURE — 84132 ASSAY OF SERUM POTASSIUM: CPT

## 2021-05-05 PROCEDURE — 2700000000 HC OXYGEN THERAPY PER DAY

## 2021-05-05 PROCEDURE — 2500000003 HC RX 250 WO HCPCS: Performed by: NEUROLOGICAL SURGERY

## 2021-05-05 PROCEDURE — 6360000002 HC RX W HCPCS: Performed by: STUDENT IN AN ORGANIZED HEALTH CARE EDUCATION/TRAINING PROGRAM

## 2021-05-05 PROCEDURE — 6360000002 HC RX W HCPCS: Performed by: NURSE ANESTHETIST, CERTIFIED REGISTERED

## 2021-05-05 PROCEDURE — 6370000000 HC RX 637 (ALT 250 FOR IP): Performed by: NURSE PRACTITIONER

## 2021-05-05 PROCEDURE — 2580000003 HC RX 258: Performed by: NURSE ANESTHETIST, CERTIFIED REGISTERED

## 2021-05-05 PROCEDURE — 3209999900 FLUORO FOR SURGICAL PROCEDURES

## 2021-05-05 PROCEDURE — 2709999900 HC NON-CHARGEABLE SUPPLY: Performed by: NEUROLOGICAL SURGERY

## 2021-05-05 PROCEDURE — 7100000000 HC PACU RECOVERY - FIRST 15 MIN: Performed by: NEUROLOGICAL SURGERY

## 2021-05-05 PROCEDURE — 0SB20ZZ EXCISION OF LUMBAR VERTEBRAL DISC, OPEN APPROACH: ICD-10-PCS | Performed by: NEUROLOGICAL SURGERY

## 2021-05-05 PROCEDURE — 3700000000 HC ANESTHESIA ATTENDED CARE: Performed by: NEUROLOGICAL SURGERY

## 2021-05-05 PROCEDURE — 3700000001 HC ADD 15 MINUTES (ANESTHESIA): Performed by: NEUROLOGICAL SURGERY

## 2021-05-05 PROCEDURE — 2720000010 HC SURG SUPPLY STERILE: Performed by: NEUROLOGICAL SURGERY

## 2021-05-05 RX ORDER — OXYCODONE HYDROCHLORIDE 5 MG/1
5 TABLET ORAL EVERY 4 HOURS PRN
Status: DISCONTINUED | OUTPATIENT
Start: 2021-05-05 | End: 2021-05-07 | Stop reason: HOSPADM

## 2021-05-05 RX ORDER — LIDOCAINE HYDROCHLORIDE AND EPINEPHRINE 10; 10 MG/ML; UG/ML
INJECTION, SOLUTION INFILTRATION; PERINEURAL PRN
Status: DISCONTINUED | OUTPATIENT
Start: 2021-05-05 | End: 2021-05-05 | Stop reason: ALTCHOICE

## 2021-05-05 RX ORDER — TRAZODONE HYDROCHLORIDE 50 MG/1
50 TABLET ORAL NIGHTLY
Status: DISCONTINUED | OUTPATIENT
Start: 2021-05-05 | End: 2021-05-07 | Stop reason: HOSPADM

## 2021-05-05 RX ORDER — SODIUM CHLORIDE 0.9 % (FLUSH) 0.9 %
10 SYRINGE (ML) INJECTION PRN
Status: DISCONTINUED | OUTPATIENT
Start: 2021-05-05 | End: 2021-05-07 | Stop reason: HOSPADM

## 2021-05-05 RX ORDER — FENTANYL CITRATE 50 UG/ML
50 INJECTION, SOLUTION INTRAMUSCULAR; INTRAVENOUS EVERY 10 MIN PRN
Status: DISCONTINUED | OUTPATIENT
Start: 2021-05-05 | End: 2021-05-05 | Stop reason: HOSPADM

## 2021-05-05 RX ORDER — SODIUM CHLORIDE 9 MG/ML
INJECTION, SOLUTION INTRAVENOUS CONTINUOUS
Status: DISCONTINUED | OUTPATIENT
Start: 2021-05-05 | End: 2021-05-07 | Stop reason: HOSPADM

## 2021-05-05 RX ORDER — LIDOCAINE HYDROCHLORIDE 10 MG/ML
1 INJECTION, SOLUTION EPIDURAL; INFILTRATION; INTRACAUDAL; PERINEURAL
Status: DISCONTINUED | OUTPATIENT
Start: 2021-05-05 | End: 2021-05-05 | Stop reason: HOSPADM

## 2021-05-05 RX ORDER — MAGNESIUM HYDROXIDE 1200 MG/15ML
LIQUID ORAL CONTINUOUS PRN
Status: DISCONTINUED | OUTPATIENT
Start: 2021-05-05 | End: 2021-05-05 | Stop reason: ALTCHOICE

## 2021-05-05 RX ORDER — ONDANSETRON 2 MG/ML
INJECTION INTRAMUSCULAR; INTRAVENOUS PRN
Status: DISCONTINUED | OUTPATIENT
Start: 2021-05-05 | End: 2021-05-05 | Stop reason: SDUPTHER

## 2021-05-05 RX ORDER — SODIUM CHLORIDE 0.9 % (FLUSH) 0.9 %
10 SYRINGE (ML) INJECTION EVERY 12 HOURS SCHEDULED
Status: DISCONTINUED | OUTPATIENT
Start: 2021-05-05 | End: 2021-05-07 | Stop reason: HOSPADM

## 2021-05-05 RX ORDER — CEFAZOLIN SODIUM 2 G/50ML
2000 SOLUTION INTRAVENOUS EVERY 8 HOURS
Status: COMPLETED | OUTPATIENT
Start: 2021-05-05 | End: 2021-05-06

## 2021-05-05 RX ORDER — LIDOCAINE HYDROCHLORIDE 20 MG/ML
INJECTION, SOLUTION INTRAVENOUS PRN
Status: DISCONTINUED | OUTPATIENT
Start: 2021-05-05 | End: 2021-05-05 | Stop reason: SDUPTHER

## 2021-05-05 RX ORDER — MIDAZOLAM HYDROCHLORIDE 1 MG/ML
INJECTION INTRAMUSCULAR; INTRAVENOUS PRN
Status: DISCONTINUED | OUTPATIENT
Start: 2021-05-05 | End: 2021-05-05 | Stop reason: SDUPTHER

## 2021-05-05 RX ORDER — ONDANSETRON 2 MG/ML
4 INJECTION INTRAMUSCULAR; INTRAVENOUS EVERY 6 HOURS PRN
Status: DISCONTINUED | OUTPATIENT
Start: 2021-05-05 | End: 2021-05-07 | Stop reason: HOSPADM

## 2021-05-05 RX ORDER — SODIUM CHLORIDE 0.9 % (FLUSH) 0.9 %
10 SYRINGE (ML) INJECTION PRN
Status: DISCONTINUED | OUTPATIENT
Start: 2021-05-05 | End: 2021-05-05 | Stop reason: HOSPADM

## 2021-05-05 RX ORDER — HYDROCODONE BITARTRATE AND ACETAMINOPHEN 5; 325 MG/1; MG/1
1 TABLET ORAL PRN
Status: DISCONTINUED | OUTPATIENT
Start: 2021-05-05 | End: 2021-05-05 | Stop reason: HOSPADM

## 2021-05-05 RX ORDER — MEPERIDINE HYDROCHLORIDE 25 MG/ML
12.5 INJECTION INTRAMUSCULAR; INTRAVENOUS; SUBCUTANEOUS EVERY 5 MIN PRN
Status: DISCONTINUED | OUTPATIENT
Start: 2021-05-05 | End: 2021-05-05 | Stop reason: HOSPADM

## 2021-05-05 RX ORDER — METOCLOPRAMIDE HYDROCHLORIDE 5 MG/ML
10 INJECTION INTRAMUSCULAR; INTRAVENOUS
Status: DISCONTINUED | OUTPATIENT
Start: 2021-05-05 | End: 2021-05-05 | Stop reason: HOSPADM

## 2021-05-05 RX ORDER — DEXAMETHASONE SODIUM PHOSPHATE 10 MG/ML
INJECTION INTRAMUSCULAR; INTRAVENOUS PRN
Status: DISCONTINUED | OUTPATIENT
Start: 2021-05-05 | End: 2021-05-05 | Stop reason: SDUPTHER

## 2021-05-05 RX ORDER — SODIUM CHLORIDE 0.9 % (FLUSH) 0.9 %
10 SYRINGE (ML) INJECTION EVERY 12 HOURS SCHEDULED
Status: DISCONTINUED | OUTPATIENT
Start: 2021-05-05 | End: 2021-05-05 | Stop reason: HOSPADM

## 2021-05-05 RX ORDER — SODIUM CHLORIDE 9 MG/ML
25 INJECTION, SOLUTION INTRAVENOUS PRN
Status: DISCONTINUED | OUTPATIENT
Start: 2021-05-05 | End: 2021-05-07 | Stop reason: HOSPADM

## 2021-05-05 RX ORDER — ROCURONIUM BROMIDE 10 MG/ML
INJECTION, SOLUTION INTRAVENOUS PRN
Status: DISCONTINUED | OUTPATIENT
Start: 2021-05-05 | End: 2021-05-05 | Stop reason: SDUPTHER

## 2021-05-05 RX ORDER — FENTANYL CITRATE 50 UG/ML
INJECTION, SOLUTION INTRAMUSCULAR; INTRAVENOUS PRN
Status: DISCONTINUED | OUTPATIENT
Start: 2021-05-05 | End: 2021-05-05 | Stop reason: SDUPTHER

## 2021-05-05 RX ORDER — ATORVASTATIN CALCIUM 80 MG/1
80 TABLET, FILM COATED ORAL NIGHTLY
Status: DISCONTINUED | OUTPATIENT
Start: 2021-05-05 | End: 2021-05-07 | Stop reason: HOSPADM

## 2021-05-05 RX ORDER — PROPOFOL 10 MG/ML
INJECTION, EMULSION INTRAVENOUS PRN
Status: DISCONTINUED | OUTPATIENT
Start: 2021-05-05 | End: 2021-05-05 | Stop reason: SDUPTHER

## 2021-05-05 RX ORDER — METOPROLOL SUCCINATE 50 MG/1
50 TABLET, EXTENDED RELEASE ORAL DAILY
Status: DISCONTINUED | OUTPATIENT
Start: 2021-05-06 | End: 2021-05-07 | Stop reason: HOSPADM

## 2021-05-05 RX ORDER — HYDROCODONE BITARTRATE AND ACETAMINOPHEN 5; 325 MG/1; MG/1
2 TABLET ORAL PRN
Status: DISCONTINUED | OUTPATIENT
Start: 2021-05-05 | End: 2021-05-05 | Stop reason: HOSPADM

## 2021-05-05 RX ORDER — SODIUM CHLORIDE, SODIUM LACTATE, POTASSIUM CHLORIDE, CALCIUM CHLORIDE 600; 310; 30; 20 MG/100ML; MG/100ML; MG/100ML; MG/100ML
INJECTION, SOLUTION INTRAVENOUS CONTINUOUS PRN
Status: DISCONTINUED | OUTPATIENT
Start: 2021-05-05 | End: 2021-05-05 | Stop reason: SDUPTHER

## 2021-05-05 RX ORDER — ACETAMINOPHEN 325 MG/1
650 TABLET ORAL EVERY 4 HOURS PRN
Status: DISCONTINUED | OUTPATIENT
Start: 2021-05-05 | End: 2021-05-07 | Stop reason: HOSPADM

## 2021-05-05 RX ORDER — SODIUM CHLORIDE, SODIUM LACTATE, POTASSIUM CHLORIDE, CALCIUM CHLORIDE 600; 310; 30; 20 MG/100ML; MG/100ML; MG/100ML; MG/100ML
INJECTION, SOLUTION INTRAVENOUS CONTINUOUS
Status: DISCONTINUED | OUTPATIENT
Start: 2021-05-05 | End: 2021-05-05

## 2021-05-05 RX ORDER — POLYETHYLENE GLYCOL 3350 17 G/17G
17 POWDER, FOR SOLUTION ORAL DAILY PRN
Status: DISCONTINUED | OUTPATIENT
Start: 2021-05-05 | End: 2021-05-07 | Stop reason: HOSPADM

## 2021-05-05 RX ORDER — OXYCODONE HYDROCHLORIDE 5 MG/1
5 TABLET ORAL EVERY 6 HOURS PRN
Status: DISCONTINUED | OUTPATIENT
Start: 2021-05-05 | End: 2021-05-07 | Stop reason: HOSPADM

## 2021-05-05 RX ORDER — CEFAZOLIN SODIUM 2 G/50ML
2000 SOLUTION INTRAVENOUS
Status: COMPLETED | OUTPATIENT
Start: 2021-05-05 | End: 2021-05-05

## 2021-05-05 RX ORDER — SODIUM CHLORIDE 9 MG/ML
25 INJECTION, SOLUTION INTRAVENOUS PRN
Status: DISCONTINUED | OUTPATIENT
Start: 2021-05-05 | End: 2021-05-05 | Stop reason: HOSPADM

## 2021-05-05 RX ORDER — VANCOMYCIN HYDROCHLORIDE 1 G/20ML
INJECTION, POWDER, LYOPHILIZED, FOR SOLUTION INTRAVENOUS PRN
Status: DISCONTINUED | OUTPATIENT
Start: 2021-05-05 | End: 2021-05-05 | Stop reason: ALTCHOICE

## 2021-05-05 RX ORDER — DIPHENHYDRAMINE HYDROCHLORIDE 50 MG/ML
12.5 INJECTION INTRAMUSCULAR; INTRAVENOUS
Status: DISCONTINUED | OUTPATIENT
Start: 2021-05-05 | End: 2021-05-05 | Stop reason: HOSPADM

## 2021-05-05 RX ORDER — ROPIVACAINE HYDROCHLORIDE 5 MG/ML
INJECTION, SOLUTION EPIDURAL; INFILTRATION; PERINEURAL PRN
Status: DISCONTINUED | OUTPATIENT
Start: 2021-05-05 | End: 2021-05-05 | Stop reason: SDUPTHER

## 2021-05-05 RX ORDER — LISINOPRIL 20 MG/1
20 TABLET ORAL 2 TIMES DAILY
Status: DISCONTINUED | OUTPATIENT
Start: 2021-05-05 | End: 2021-05-07 | Stop reason: HOSPADM

## 2021-05-05 RX ORDER — LIDOCAINE HYDROCHLORIDE AND EPINEPHRINE 10; 10 MG/ML; UG/ML
INJECTION, SOLUTION INFILTRATION; PERINEURAL PRN
Status: DISCONTINUED | OUTPATIENT
Start: 2021-05-05 | End: 2021-05-05 | Stop reason: SDUPTHER

## 2021-05-05 RX ORDER — ONDANSETRON 2 MG/ML
4 INJECTION INTRAMUSCULAR; INTRAVENOUS
Status: DISCONTINUED | OUTPATIENT
Start: 2021-05-05 | End: 2021-05-05 | Stop reason: HOSPADM

## 2021-05-05 RX ADMIN — ROPIVACAINE HYDROCHLORIDE 20 ML: 5 INJECTION, SOLUTION EPIDURAL; INFILTRATION; PERINEURAL at 07:11

## 2021-05-05 RX ADMIN — PHENYLEPHRINE HYDROCHLORIDE 100 MCG: 10 INJECTION INTRAVENOUS at 08:14

## 2021-05-05 RX ADMIN — SODIUM CHLORIDE 100 ML/HR: 9 INJECTION, SOLUTION INTRAVENOUS at 17:57

## 2021-05-05 RX ADMIN — PROPOFOL 200 MG: 10 INJECTION, EMULSION INTRAVENOUS at 07:35

## 2021-05-05 RX ADMIN — HYDROMORPHONE HYDROCHLORIDE 0.5 MG: 1 INJECTION, SOLUTION INTRAMUSCULAR; INTRAVENOUS; SUBCUTANEOUS at 10:16

## 2021-05-05 RX ADMIN — LISINOPRIL 20 MG: 20 TABLET ORAL at 20:53

## 2021-05-05 RX ADMIN — SODIUM CHLORIDE, POTASSIUM CHLORIDE, SODIUM LACTATE AND CALCIUM CHLORIDE: 600; 310; 30; 20 INJECTION, SOLUTION INTRAVENOUS at 07:00

## 2021-05-05 RX ADMIN — FENTANYL CITRATE 25 MCG: 50 INJECTION, SOLUTION INTRAMUSCULAR; INTRAVENOUS at 09:39

## 2021-05-05 RX ADMIN — ONDANSETRON 4 MG: 2 INJECTION INTRAMUSCULAR; INTRAVENOUS at 09:10

## 2021-05-05 RX ADMIN — OXYCODONE 5 MG: 5 TABLET ORAL at 10:46

## 2021-05-05 RX ADMIN — HYDROMORPHONE HYDROCHLORIDE 0.5 MG: 1 INJECTION, SOLUTION INTRAMUSCULAR; INTRAVENOUS; SUBCUTANEOUS at 11:44

## 2021-05-05 RX ADMIN — PHENYLEPHRINE HYDROCHLORIDE 100 MCG: 10 INJECTION INTRAVENOUS at 08:53

## 2021-05-05 RX ADMIN — OXYCODONE 5 MG: 5 TABLET ORAL at 23:29

## 2021-05-05 RX ADMIN — FENTANYL CITRATE 25 MCG: 50 INJECTION, SOLUTION INTRAMUSCULAR; INTRAVENOUS at 07:55

## 2021-05-05 RX ADMIN — OXYCODONE 5 MG: 5 TABLET ORAL at 14:42

## 2021-05-05 RX ADMIN — FENTANYL CITRATE 50 MCG: 50 INJECTION, SOLUTION INTRAMUSCULAR; INTRAVENOUS at 09:57

## 2021-05-05 RX ADMIN — OXYCODONE 5 MG: 5 TABLET ORAL at 19:29

## 2021-05-05 RX ADMIN — CEFAZOLIN SODIUM 2000 MG: 2 SOLUTION INTRAVENOUS at 15:36

## 2021-05-05 RX ADMIN — SUGAMMADEX 200 MG: 100 INJECTION, SOLUTION INTRAVENOUS at 09:32

## 2021-05-05 RX ADMIN — CEFAZOLIN SODIUM 2000 MG: 2 SOLUTION INTRAVENOUS at 07:29

## 2021-05-05 RX ADMIN — TRAZODONE HYDROCHLORIDE 50 MG: 50 TABLET ORAL at 20:52

## 2021-05-05 RX ADMIN — DEXAMETHASONE SODIUM PHOSPHATE 10 MG: 10 INJECTION INTRAMUSCULAR; INTRAVENOUS at 07:46

## 2021-05-05 RX ADMIN — LIDOCAINE HYDROCHLORIDE 50 MG: 20 INJECTION, SOLUTION INTRAVENOUS at 07:35

## 2021-05-05 RX ADMIN — PHENYLEPHRINE HYDROCHLORIDE 100 MCG: 10 INJECTION INTRAVENOUS at 08:21

## 2021-05-05 RX ADMIN — ROCURONIUM BROMIDE 20 MG: 10 INJECTION INTRAVENOUS at 08:45

## 2021-05-05 RX ADMIN — ATORVASTATIN CALCIUM 80 MG: 80 TABLET, FILM COATED ORAL at 20:52

## 2021-05-05 RX ADMIN — BISACODYL 5 MG: 5 TABLET, COATED ORAL at 20:52

## 2021-05-05 RX ADMIN — ROCURONIUM BROMIDE 20 MG: 10 INJECTION INTRAVENOUS at 07:55

## 2021-05-05 RX ADMIN — PHENYLEPHRINE HYDROCHLORIDE 100 MCG: 10 INJECTION INTRAVENOUS at 08:50

## 2021-05-05 RX ADMIN — LIDOCAINE HYDROCHLORIDE AND EPINEPHRINE 10 ML: 10; 10 INJECTION, SOLUTION INFILTRATION; PERINEURAL at 07:11

## 2021-05-05 RX ADMIN — FENTANYL CITRATE 50 MCG: 50 INJECTION, SOLUTION INTRAMUSCULAR; INTRAVENOUS at 07:35

## 2021-05-05 RX ADMIN — CEFAZOLIN SODIUM 2000 MG: 2 SOLUTION INTRAVENOUS at 23:30

## 2021-05-05 RX ADMIN — HYDROMORPHONE HYDROCHLORIDE 0.5 MG: 1 INJECTION, SOLUTION INTRAMUSCULAR; INTRAVENOUS; SUBCUTANEOUS at 17:57

## 2021-05-05 RX ADMIN — ROCURONIUM BROMIDE 50 MG: 10 INJECTION INTRAVENOUS at 07:35

## 2021-05-05 RX ADMIN — SODIUM CHLORIDE, POTASSIUM CHLORIDE, SODIUM LACTATE AND CALCIUM CHLORIDE: 600; 310; 30; 20 INJECTION, SOLUTION INTRAVENOUS at 06:50

## 2021-05-05 RX ADMIN — MIDAZOLAM HYDROCHLORIDE 2 MG: 2 INJECTION, SOLUTION INTRAMUSCULAR; INTRAVENOUS at 07:05

## 2021-05-05 RX ADMIN — SODIUM CHLORIDE, PRESERVATIVE FREE 10 ML: 5 INJECTION INTRAVENOUS at 20:53

## 2021-05-05 RX ADMIN — SODIUM CHLORIDE, POTASSIUM CHLORIDE, SODIUM LACTATE AND CALCIUM CHLORIDE: 600; 310; 30; 20 INJECTION, SOLUTION INTRAVENOUS at 09:51

## 2021-05-05 RX ADMIN — FENTANYL CITRATE 50 MCG: 50 INJECTION, SOLUTION INTRAMUSCULAR; INTRAVENOUS at 10:08

## 2021-05-05 ASSESSMENT — PULMONARY FUNCTION TESTS
PIF_VALUE: 25
PIF_VALUE: 27
PIF_VALUE: 1
PIF_VALUE: 18
PIF_VALUE: 27
PIF_VALUE: 26
PIF_VALUE: 11
PIF_VALUE: 25
PIF_VALUE: 24
PIF_VALUE: 26
PIF_VALUE: 26
PIF_VALUE: 14
PIF_VALUE: 24
PIF_VALUE: 25
PIF_VALUE: 26
PIF_VALUE: 25
PIF_VALUE: 16
PIF_VALUE: 25
PIF_VALUE: 25
PIF_VALUE: 8
PIF_VALUE: 24
PIF_VALUE: 25
PIF_VALUE: 25
PIF_VALUE: 26
PIF_VALUE: 27
PIF_VALUE: 1
PIF_VALUE: 27
PIF_VALUE: 28
PIF_VALUE: 24
PIF_VALUE: 3
PIF_VALUE: 27
PIF_VALUE: 25
PIF_VALUE: 0
PIF_VALUE: 16
PIF_VALUE: 25
PIF_VALUE: 26
PIF_VALUE: 13
PIF_VALUE: 24
PIF_VALUE: 28
PIF_VALUE: 4
PIF_VALUE: 24
PIF_VALUE: 25
PIF_VALUE: 26
PIF_VALUE: 23
PIF_VALUE: 27
PIF_VALUE: 25
PIF_VALUE: 24
PIF_VALUE: 3
PIF_VALUE: 25
PIF_VALUE: 27
PIF_VALUE: 25
PIF_VALUE: 28
PIF_VALUE: 26
PIF_VALUE: 10
PIF_VALUE: 25
PIF_VALUE: 24
PIF_VALUE: 27
PIF_VALUE: 24
PIF_VALUE: 1
PIF_VALUE: 25
PIF_VALUE: 26
PIF_VALUE: 19
PIF_VALUE: 22
PIF_VALUE: 26
PIF_VALUE: 27
PIF_VALUE: 25
PIF_VALUE: 25

## 2021-05-05 ASSESSMENT — PAIN SCALES - GENERAL
PAINLEVEL_OUTOF10: 7
PAINLEVEL_OUTOF10: 5
PAINLEVEL_OUTOF10: 3
PAINLEVEL_OUTOF10: 7
PAINLEVEL_OUTOF10: 6
PAINLEVEL_OUTOF10: 7
PAINLEVEL_OUTOF10: 6

## 2021-05-05 NOTE — ANESTHESIA PRE PROCEDURE
Department of Anesthesiology  Preprocedure Note       Name:  Donald Fink   Age:  73 y.o.  :  1950                                          MRN:  [id number]         Date:  2021      Surgeon: Haider Colby):  Felicitas Cruz MD    Procedure: Procedure(s):  BILATERAL L 3-4 MICRODECOMPRESSION 1.5 HOURS/ 1 C-ARM/ POWER RONGENATHALY  (PAT AT [facility]) 1ST CASE    Medications prior to admission:   Prior to Admission medications    Medication Sig Start Date End Date Taking? Authorizing Provider   HYDROcodone-acetaminophen (NORCO) 5-325 MG per tablet Take 1 tablet by mouth 3 times daily as needed for Pain for up to 15 days. Intended supply: 30 days 21 Yes Ziggy Natarajan DO   etodolac (LODINE) 400 MG tablet Take 1 tablet by mouth 2 times daily 21 Yes Codi Sanchez MD   metoprolol succinate (TOPROL XL) 50 MG extended release tablet TAKE 1 TABLET BY MOUTH EVERY DAY 4/15/19  Yes Codi Sanchez MD   lisinopril (PRINIVIL;ZESTRIL) 20 MG tablet Take 1 tablet by mouth 2 times daily. 14  Yes Codi Sanchez MD   atorvastatin (LIPITOR) 80 MG tablet Take 1 tablet by mouth daily. 14  Yes Codi Sanchez MD   sodium polystyrene (KAYEXALATE) 15 GM/60ML suspension Take 60 mLs by mouth once for 1 dose 21  JAE Whitt   Handgracep Placard MISC by Does not apply route Applying form 2021 -2022. 21   Codi Sanchez MD   tiZANidine (ZANAFLEX) 4 MG tablet Take 1 tablet by mouth 3 times daily as needed (back pain) 21   Codi Sanchez MD   traZODone (DESYREL) 50 MG tablet 0.5 tab po at night prn insomnia 10/9/20   Codi Sanchez MD   nitroGLYCERIN (NITROSTAT) 0.4 MG SL tablet DISSOLVE 1 TABLET UNDER THE TONGUE AS NEEDED FOR CHEST PAIN- MAY REPEAT EVERY 5 MINUTES IF NEEDED ( MAX 3 DOSES.- IF NO RELIEF CALL 911) 20   Historical Provider, MD   aspirin 81 MG EC tablet Take 81 mg by mouth daily.       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 G56.00    Spinal stenosis of lumbar region with neurogenic claudication M48.062    Herniated nucleus pulposus, lumbar M51.26    Smoker F17.200    Pre-op examination Z01.818       Past Medical History:        Diagnosis Date    Anxiety     CAD (coronary artery disease)     had CABG    HIGH CHOLESTEROL     Hypercholesterolemia     Hyperlipidemia     Hypertension     Keratosis     Neuropathy     Psoriasis     Sleep apnea        Past Surgical History:        Procedure Laterality Date    CARDIAC SURGERY      CARPAL TUNNEL RELEASE Left     COLONOSCOPY  9-10-12    dr Arguelles Frank /2 polyps, diverticulosis, int hemmorrhoids    CORONARY ARTERY BYPASS GRAFT  12    CABG X 3    SD REVISE MEDIAN N/CARPAL TUNNEL SURG Right 10/30/2018    RIGHT WRIST CARPAL TUNNEL RELEASE SUPINE performed by Tone Whitman MD at [facility]       Social History:    Social History     Tobacco Use    Smoking status: Light Tobacco Smoker     Packs/day: 0.50     Years: 3.00     Pack years: 1.50     Types: Cigarettes     Last attempt to quit: 2012     Years since quittin.3    Smokeless tobacco: Never Used   Substance Use Topics    Alcohol use: Yes     Comment: OCC.                                 Ready to quit: Not Answered  Counseling given: Not Answered      Vital Signs (Current):   Vitals:    05/05/21 0612   BP: 134/77   Pulse: 80   Resp: 16   Temp: 97.4 °F (36.3 °C)   TempSrc: Temporal   SpO2: 97%   Weight: 203 lb (92.1 kg)   Height: 5' 7\" (1.702 m)                                              BP Readings from Last 3 Encounters:   05/05/21 134/77   04/30/21 134/84   04/12/21 (!) 164/90       NPO Status: Time of last liquid consumption: 2200(sow with medication)                        Time of last solid consumption: 1800                        Date of last liquid consumption: 05/04/21                        Date of last solid food consumption: 05/04/21    BMI:   Wt Readings from Last 3 Encounters:   05/05/21 203 lb (92.1 kg)   04/30/21 203 lb (92.1 kg)   04/24/21 203 lb (92.1 kg)     Body mass index is 31.79 kg/m². CBC:   Lab Results   Component Value Date    WBC 15.9 04/30/2021    RBC 4.91 04/30/2021    RBC 4.22 01/31/2012    HGB 15.6 04/30/2021    HCT 46.6 04/30/2021    MCV 95.0 04/30/2021    RDW 13.6 04/30/2021     04/30/2021       CMP:   Lab Results   Component Value Date     04/30/2021    K 5.6 04/30/2021     04/30/2021    CO2 20 04/30/2021    BUN 36 04/30/2021    CREATININE 1.09 04/30/2021    GFRAA >60.0 04/30/2021    LABGLOM >60.0 04/30/2021    GLUCOSE 112 04/30/2021    GLUCOSE 96 01/14/2012    PROT 6.8 07/17/2020    CALCIUM 8.6 04/30/2021    BILITOT 0.4 07/17/2020    ALKPHOS 58 07/17/2020    AST 22 07/17/2020    ALT 22 07/17/2020       POC Tests: No results for input(s): POCGLU, POCNA, POCK, POCCL, POCBUN, POCHEMO, POCHCT in the last 72 hours.     Coags:   Lab Results   Component Value Date    PROTIME 13.5 04/30/2021    PROTIME 11.2 01/10/2012    INR 1.0 04/30/2021    APTT 25.9 04/30/2021       HCG (If Applicable): No results found for: PREGTESTUR, PREGSERUM, HCG, HCGQUANT     ABGs: No results found for: PHART, PO2ART, GIN4EIM, IRW8UVH, BEART, Z5HRZTIZ     Type & Screen (If Applicable):  No results found for: Anoop Kennedy Drug/Infectious Status (If Applicable):  No results found for: HIV, HEPCAB    COVID-19 Screening (If Applicable):   Lab Results   Component Value Date    COVID19 Not Detected 04/30/2021           Anesthesia Evaluation  Patient summary reviewed and Nursing notes reviewed no history of anesthetic complications:   Airway: Mallampati: II  TM distance: >3 FB   Neck ROM: full  Mouth opening: > = 3 FB Dental: normal exam         Pulmonary:Negative Pulmonary ROS and normal exam  breath sounds clear to auscultation  (+) sleep apnea:  current smoker          Patient did not smoke on day of surgery. Cardiovascular:Negative CV ROS  Exercise tolerance: good (>4 METS),   (+) hypertension:, CAD:, CABG/stent:, hyperlipidemia      ECG reviewed  Rhythm: regular  Rate: normal           Beta Blocker:  Dose within 24 Hrs         Neuro/Psych:   Negative Neuro/Psych ROS  (+) neuromuscular disease:,             GI/Hepatic/Renal: Neg GI/Hepatic/Renal ROS            Endo/Other:    (+) electrolyte abnormalities, . Pt had PAT visit. Abdominal:           Vascular: negative vascular ROS. Anesthesia Plan      general and regional     ASA 3     (ETT  MAXI  Discussed risk of post operative visual disturbances, facial trauma, facial edema and pressure sores)  Induction: intravenous. MIPS: Postoperative opioids intended. Anesthetic plan and risks discussed with patient. Plan discussed with CRNA.                   [facility],    5/5/2021

## 2021-05-05 NOTE — H&P
Patient:  Ky Saldana  YOB: 1950  Date: 5 5 2021  The patient is a 73 y.o. male who presents today for evaluation of the following problems:          Chief Complaint   Patient presents with    Back Pain         Referred by No ref. provider found     HISTORY OF PRESENT ILLNESS:     He has not tried physical therapy. Date of last of last PT treatment: none           Estimated body mass index is 31.79 kg/m² as calculated from the following:    Height as of this encounter: 5' 7\" (1.702 m). Weight as of this encounter: 203 lb (92.1 kg). PAST MEDICAL, FAMILY AND SOCIAL HISTORY:  Past Medical History        Past Medical History:   Diagnosis Date    Anxiety      CAD (coronary artery disease) 2012     had CABG    HIGH CHOLESTEROL      Hypercholesterolemia      Hyperlipidemia      Hypertension      Keratosis      Psoriasis      Sleep apnea           Past Surgical History         Past Surgical History:   Procedure Laterality Date    CARDIAC SURGERY        CARPAL TUNNEL RELEASE Left      COLONOSCOPY   9-10-12     dr Juana Purcell /2 polyps, diverticulosis, int hemmorrhoids    CORONARY ARTERY BYPASS GRAFT   1/9/12     CABG X 3    ND REVISE MEDIAN N/CARPAL TUNNEL SURG Right 10/30/2018     RIGHT WRIST CARPAL TUNNEL RELEASE SUPINE performed by Nishi Ma MD at [facility]         Family History         Family History   Problem Relation Age of Onset    Coronary Art Dis Mother      Heart Failure Mother      Heart Disease Mother      Heart Surgery Sister      Cancer Sister           breast    Heart Disease Sister      Heart Disease Father      Heart Disease Brother                  Current Outpatient Medications on File Prior to Visit   Medication Sig Dispense Refill    methylPREDNISolone (MEDROL, JOYCE,) 4 MG tablet Take by mouth.  1 kit 0    HYDROcodone-acetaminophen (NORCO) 5-325 MG per tablet Take 1 tablet by mouth 3 times daily as needed for Pain for up to 15 days. Intended supply: 30 days 45 tablet 0    Handicap Placard MISC by Does not apply route Applying form 2021 -2022. 1 each 0    tiZANidine (ZANAFLEX) 4 MG tablet Take 1 tablet by mouth 3 times daily as needed (back pain) 30 tablet 0    etodolac (LODINE) 400 MG tablet Take 1 tablet by mouth 2 times daily 60 tablet 0    traZODone (DESYREL) 50 MG tablet 0.5 tab po at night prn insomnia 30 tablet 5    nitroGLYCERIN (NITROSTAT) 0.4 MG SL tablet DISSOLVE 1 TABLET UNDER THE TONGUE AS NEEDED FOR CHEST PAIN- MAY REPEAT EVERY 5 MINUTES IF NEEDED ( MAX 3 DOSES.- IF NO RELIEF CALL 911)        metoprolol succinate (TOPROL XL) 50 MG extended release tablet TAKE 1 TABLET BY MOUTH EVERY DAY 90 tablet 3    lisinopril (PRINIVIL;ZESTRIL) 20 MG tablet Take 1 tablet by mouth 2 times daily. 180 tablet 3    atorvastatin (LIPITOR) 80 MG tablet Take 1 tablet by mouth daily. 90 tablet 3    aspirin 81 MG EC tablet Take 81 mg by mouth daily. No current facility-administered medications on file prior to visit. Social History            Tobacco Use    Smoking status: Former Smoker       Packs/day: 0.50       Years: 3.00       Pack years: 1.50       Types: Cigarettes       Quit date: 2012       Years since quittin.3    Smokeless tobacco: Never Used   Substance Use Topics    Alcohol use: Yes       Comment: OCC.  Drug use: No         ALLERGIES  No Known Allergies     REVIEW OF SYSTEMS:  Review of Systems   Constitutional: Negative for fever. HENT: Negative for hearing loss. Respiratory: Negative for shortness of breath. Gastrointestinal: Negative for constipation, diarrhea and nausea. Genitourinary: Negative for difficulty urinating. Musculoskeletal: Positive for back pain. Negative for neck pain. Skin: Negative for rash. Neurological: Negative for headaches. Hematological: Bruises/bleeds easily. Psychiatric/Behavioral: Negative for sleep disturbance. Physical Exam:       Vitals       Vitals:     04/24/21 0859   Temp: 97.1 °F (36.2 °C)   Weight: 203 lb (92.1 kg)   Height: 5' 7\" (1.702 m)         4/22/2021 MRI lumbar spine       72-year-old male who does smoke has been locked up with her houses for 2 years because a covert. They have [place] head trip planned in the end of May in which there locked in. For 3 weeks she's had severe back pain to the point where he is unable to ambulate. He can barely get up the stairs to go to bed mornings can barely get out of bed because the pain in his back radiating down both lower extremities into his legs left greater than right but significant bilaterally. Sometimes he can't get his legs going. Bladder bowel control is intact. Unable to respond to a full course of physical therapy pain management. Exam shows the patient can push himself up out of the chair. He is mildly paraparetic he can go up on his toes and his heels but has difficulty with ambulation. Regular gait is a very slow short stepped flat-footed gait. With assistance he can stand on one leg or the other but not independently. Deep tendon reflexes are absent in the knees and ankles. SLR negative for radicular pain good range of motion of the knees and hip sensation objectively intact light touch. Lungs decreased breath sounds secondary to smoking heart tones are normal no rubs or murmurs. He has had heart surgery. Cranial nerves II 12 grossly intact no adenopathy abdomen soft bowel sounds present skin is good color radialis pulses palpable. Impression severe L3-4 stenosis HNP     Discussed bilateral L3-4 microdecompression with the patient and the wife. The procedure, indications and risks of the surgery have been discussed.  There is a low chance of having any type of risk, but the risks are still present including even something serious like death, paralysis, sensory loss, loss of bladder or bowel control, pain, bleeding, infection, chance of recurrence and so forth. Surgery is not a guarantee of normalcy. We cannot undo any permanent damage already done. We cannot change the course of any of the other medical diseases. I have discussed alternative procedures, risks and benefits. I have answered all questions. There is understanding and agreement to proceed.      Plan bilateral L3-4 microdecompression     Sarkis Ford MD

## 2021-05-05 NOTE — BRIEF OP NOTE
Brief Postoperative Note      Patient: Ronald Luis  YOB: 1950  MRN: [id number]    Date of Procedure: 5/5/2021    Pre-Op Diagnosis: L3-4 STENOSIS, HERNIATED NUCLEUS PULPOSUS    Post-Op Diagnosis: Same       Procedure(s):  BILATERAL L 3-4 MICRODECOMPRESSION    Surgeon(s):  Tamera Dale MD    Assistant:  First Assistant: Ebony Burgos    Anesthesia: General    Estimated Blood Loss (mL): less than 50     Complications: None        Drains:   [REMOVED] Closed/Suction Drain Inferior Back Accordion 10 Vietnamese (Removed)       Findings: Stenosis and HNP    Electronically signed by Ganesh Fernandez MD on 5/5/2021 at 9:34 AM

## 2021-05-05 NOTE — OP NOTE
[address]                      [address] [place], [address] [facility]                                OPERATIVE REPORT    PATIENT NAME: Romina May                     :        1950  MED REC NO:   [id number]                            ROOM:  ACCOUNT NO:   [de-identified]                           ADMIT DATE: 2021  PROVIDER:     Jeremy Burrell MD    DATE OF PROCEDURE:  2021    PREOPERATIVE DIAGNOSIS:  L4-L5 severe canal stenosis, protruded disk. POSTOPERATIVE DIAGNOSIS:  L4-L5 severe canal stenosis, protruded disk. OPERATION PERFORMED:  Bilateral L3-L4 microdissection, decompression,  diskectomy. SURGEON:  Jeremy Burrell MD    ANESTHESIA:  General endotracheal anesthesia. DESCRIPTION OF PROCEDURE:  The patient was given general endotracheal  anesthesia in supine position. Turned to prone position. Back was  prepped and draped. Time-out, patient identified. Needle was placed. Lateral x-rays obtained to confirm level. Needle was withdrawn. Skin  infiltrated with 1% lidocaine with epinephrine. Skin incision made over  the tips of spinous process of L3 on the left side. The dissection was  carried down to the fascia which was split, exposing the lamina, medial  aspect of the facet joint complex on the left at L3-L4. Needle was  placed. Lateral x-rays obtained to confirm level. Needle was  withdrawn. Working on the patient's right side, the fascia was excised  and I could see an exposure of the right L3 spinous process lamina and  medial aspect of the facet joint complex L3-L4 identified. Microscope  was brought into the field after placement of the microretractor. On  the right side, partial hemilaminectomy at the inferior aspect of L3 was  performed, working superiorly and out laterally taking great care to  preserve the facet joint complex of L3-L4.   Ligamentum flavum was  removed from the medial aspect of the facet joint complex and from the superior lamina of L4. Gently, the dural sac was mobilized medially and  indeed there was a protruded disk. I made a small cut in the annulus  and removed the protruded disk directly from the anterior aspect of the  dural sac. I entered into the disk space removing the degenerated disk  material.  This area was then thoroughly irrigated. Vancomycin powder  was applied. Working on the patient's left side, partial hemilaminectomy at the  inferior aspect of L3 was performed removing the ligamentum flavum from  the medial aspect of the facet joint complex and from the superior  lamina of L4, keeping the L3-L4 joint intact. The dura was gently  mobilized medially. I encountered a much larger portion of the  protruded disk. A small cut was made and the protruded disk material  was removed from the anterior aspect of the dural sac decompressing the  dural sac completely. Entered into the disk space removing the  degenerated disk material to prevent and reduce the chances of  recurrence. The wound was thoroughly irrigated. Vancomycin powder was  applied. Retractor removed. Fascia closed with 0 Vicryl suture. Subcutaneous tissues were closed with 2-0, 3-0, 4-0 Vicryl suture. Minimal blood loss less than 20 mL. No blood transfused. The patient  tolerated the procedure well.         Krish Pretty MD    D: 05/05/2021 9:51:29       T: 05/05/2021 9:59:06     [email or web address]  Job#: [id number]     Doc#: [id number]    CC:

## 2021-05-05 NOTE — CONSULTS
Consult Note    Reason for Consult: Medical management of chronic conditions including coronary artery disease, hyperlipidemia and hypertension. Requesting Physician:  Charlie Dukes MD    HISTORY OF PRESENT ILLNESS:    The patient is a 73 y.o. male who presents status post bilateral L3-L4 microdissection, decompression and diskectomy for severe canal stenosis of L4-L5. Patient tolerated procedure well. Has no needs at this time. Hemodynamically stable and afebrile. Patient Seen, Chart, Labs, Radiologystudies, and Consults reviewed. Patient denies headache, chest pain, shortness of breath, N/V/D/C, fever/chills. Past Medical History:   Diagnosis Date    Anxiety     CAD (coronary artery disease) 2012    had CABG    HIGH CHOLESTEROL     Hypercholesterolemia     Hyperlipidemia     Hypertension     Keratosis     Neuropathy     Psoriasis     Sleep apnea        Past Surgical History:   Procedure Laterality Date    CARDIAC SURGERY      CARPAL TUNNEL RELEASE Left     COLONOSCOPY  9-10-12    dr Kelly Pauline /2 polyps, diverticulosis, int hemmorrhoids    CORONARY ARTERY BYPASS GRAFT  1/9/12    CABG X 3    [place] REVISE MEDIAN N/CARPAL TUNNEL SURG Right 10/30/2018    RIGHT WRIST CARPAL TUNNEL RELEASE SUPINE performed by Pao Perez MD at [facility] [address]       Prior to Admission medications    Medication Sig Start Date End Date Taking? Authorizing Provider   HYDROcodone-acetaminophen (NORCO) 5-325 MG per tablet Take 1 tablet by mouth 3 times daily as needed for Pain for up to 15 days. Intended supply: 30 days 4/22/21 5/7/21 Yes Erlin Bill DO   etodolac (LODINE) 400 MG tablet Take 1 tablet by mouth 2 times daily 4/5/21 5/5/21 Yes Skylar Parr MD   metoprolol succinate (TOPROL XL) 50 MG extended release tablet TAKE 1 TABLET BY MOUTH EVERY DAY 4/15/19  Yes Skylar Parr MD   lisinopril (PRINIVIL;ZESTRIL) 20 MG tablet Take 1 tablet by mouth 2 times daily.  1/30/14 Yes Winston Galan MD   atorvastatin (LIPITOR) 80 MG tablet Take 1 tablet by mouth daily. 1/29/14  Yes Winston Galan MD   HYDROcodone-acetaminophen (NORCO) 5-325 MG per tablet Take 1 tablet by mouth every 6 hours as needed for Pain for up to 7 days. Intended supply: 7 days. Take lowest dose possible to manage pain 5/5/21 5/12/21  Franci Balderas MD   sodium polystyrene (KAYEXALATE) 15 GM/60ML suspension Take 60 mLs by mouth once for 1 dose 4/30/21 4/30/21  JAE Britt   Handicap Placard MISC by Does not apply route Applying form 4/12/2021 -4/11/2022. 4/12/21   Winston Galan MD   tiZANidine (ZANAFLEX) 4 MG tablet Take 1 tablet by mouth 3 times daily as needed (back pain) 4/5/21   Winston Galan MD   traZODone (DESYREL) 50 MG tablet 0.5 tab po at night prn insomnia 10/9/20   Winston Galan MD   nitroGLYCERIN (NITROSTAT) 0.4 MG SL tablet DISSOLVE 1 TABLET UNDER THE TONGUE AS NEEDED FOR CHEST PAIN- MAY REPEAT EVERY 5 MINUTES IF NEEDED ( MAX 3 DOSES.- IF NO RELIEF CALL 911) 1/29/20   Historical Provider, MD   aspirin 81 MG EC tablet Take 81 mg by mouth daily.       Historical Provider, MD       Scheduled Meds:   sodium chloride flush  10 mL Intravenous 2 times per day    ceFAZolin (ANCEF) IVPB  2,000 mg Intravenous Q8H    bisacodyl  5 mg Oral Daily     Continuous Infusions:   sodium chloride 100 mL/hr (05/05/21 5093)    sodium chloride       PRN Meds:sodium chloride flush, sodium chloride, acetaminophen, oxyCODONE **OR** oxyCODONE, HYDROmorphone **OR** HYDROmorphone, ondansetron, magnesium hydroxide, polyethylene glycol    No Known Allergies    Social History     Socioeconomic History    Marital status:      Spouse name: Not on file    Number of children: Not on file    Years of education: Not on file    Highest education level: Not on file   Occupational History    Not on file   Social Needs    Financial resource strain: Not hard at all    Food insecurity     Worry: Never true Inability: Never true    Transportation needs     Medical: No     Non-medical: No   Tobacco Use    Smoking status: Light Tobacco Smoker     Packs/day: 0.50     Years: 3.00     Pack years: 1.50     Types: Cigarettes     Last attempt to quit: 2012     Years since quittin.3    Smokeless tobacco: Never Used   Substance and Sexual Activity    Alcohol use: Yes     Comment: OCC.  Drug use: No    Sexual activity: Not Currently   Lifestyle    Physical activity     Days per week: Not on file     Minutes per session: Not on file    Stress: Not on file   Relationships    Social connections     Talks on phone: Not on file     Gets together: Not on file     Attends Faith service: Not on file     Active member of club or organization: Not on file     Attends meetings of clubs or organizations: Not on file     Relationship status: Not on file    Intimate partner violence     Fear of current or ex partner: Not on file     Emotionally abused: Not on file     Physically abused: Not on file     Forced sexual activity: Not on file   Other Topics Concern    Not on file   Social History Narrative    Not on file       Family History   Problem Relation Age of Onset    Coronary Art Dis Mother     Heart Failure Mother     Heart Disease Mother     Heart Surgery Sister     Cancer Sister         breast    Heart Disease Sister     Heart Disease Father     Heart Disease Brother        Review Of Systems:   Constitutional: Negative for fever. HENT: Negative for hearing loss.    Respiratory: Negative for shortness of breath.    Gastrointestinal: Negative for constipation, diarrhea and nausea. Genitourinary: Negative for difficulty urinating. Musculoskeletal: Positive for back pain. Negative for neck pain. Skin: Negative for rash. Neurological: Negative for headaches.    Hematological: negative  Psychiatric/Behavioral: Negative for sleep disturbance.     Physical Exam:  Vitals:    21 1100 21 1105 05/05/21 1300 05/05/21 1440   BP: (!) 161/90  (!) 142/69    Pulse: 85 85 82 82   Resp: 11 19 13    Temp:    97 °F (36.1 °C)   TempSrc:       SpO2: 100% 100% 100% 94%   Weight:       Height:           CONSTITUTIONAL:  awake, alert, cooperative, no apparent distress, and appears stated age  EYES:  Lids and lashes normal, pupils equal, round and reactive to light, extra ocular muscles intact, sclera clear, conjunctiva normal  ENT:  Normocephalic, without obvious abnormality, atraumatic, sinuses nontender on palpation, external ears without lesions, oral pharynx with moist mucus membranes, tonsils without erythema or exudates, gums normal and good dentition. NECK:  Supple, symmetrical, trachea midline, no adenopathy, thyroid symmetric, not enlarged and no tenderness, skin normal  LUNGS:  No increased work of breathing, good air exchange, clear to auscultation bilaterally, no crackles or wheezing  CARDIOVASCULAR:  Normal apical impulse, regular rate and rhythm, normal S1 and S2, no S3 or S4, and no murmur noted  ABDOMEN:  No scars, normal bowel sounds, soft, non-distended, non-tender, no masses palpated, no hepatosplenomegally  MUSCULOSKELETAL:  There is no redness, warmth, or swelling of the joints. Full range of motion noted. Motor strength is 5 out of 5 all extremities bilaterally. Tone is normal.  NEUROLOGIC:  Awake, alert, oriented to name, place and time. Cranial nerves II-XII are grossly intact. Motor is 5 out of 5 bilaterally. Sensory is intact.   Babinski down going, Romberg negative  SKIN:  no rashes and no lesions    Labs:  Recent Results (from the past 24 hour(s))   POTASSIUM    Collection Time: 05/05/21  6:15 AM   Result Value Ref Range    Potassium 4.7 3.4 - 4.9 mEq/L   Urine Drug Screen    Collection Time: 05/05/21  6:15 AM   Result Value Ref Range    Amphetamine Screen, Urine Neg Negative <1000 ng/mL    Barbiturate Screen, Ur Neg Negative < 200 ng/mL    Benzodiazepine Screen, Urine Neg Negative < 200 ng/mL    Cannabinoid Scrn, Ur Neg Negative < 50 ng/mL    Cocaine Metabolite Screen, Urine Neg Negative < 300 ng/mL    Opiate Scrn, Ur POSITIVE (A) Negative < 300 ng/mL    PCP Screen, Urine Neg Negative < 25 ng/mL    Methadone Screen, Urine Neg Negative <300 ng/mL    Propoxyphene Scrn, Ur Neg Negative <300 ng/mL    Oxycodone Urine Neg Negative <100 ng/mL    Drug Screen Comment: see below      Assessment/Plan:  1. Spinal stenosis of lumbar region with neurogenic claudication status post bilateral L3-L4 microdissection, decompression and diskectomy. Postsurgical and pain management per neurosurgery. Continue neurovascular checks. Monitor for signs symptoms of urinary retention. Incentive spirometry. Wound care instructions ordered per neurosurgery  2. Hypertension/hyperlipidemia/CAD: Continue atorvastatin, lisinopril and metoprolol. Resume aspirin upon discharge. 3. Anxiety and sleep disturbance: Continue trazodone 50 mg nightly    Electronically signed by SWAPNA Gallardo CNP on 5/5/21 at 6:17 PM EDT    I saw and evaluated the pt. I personally obtained the key and critical portions of the history and physical exam and made additions where appropriate in the documentation.  I reviewed the mid level documentation and agree with assessment and plan that we come up with together    Linda Dao, DO  Internal Medicine

## 2021-05-05 NOTE — DISCHARGE INSTR - COC
Continuity of Care Form    Patient Name: Laquita Robledo   :  1950  MRN:  [id number]    [facility] date:  2021  Discharge date:  ***    Code Status Order: Prior   Advance Directives:   Donell Francis 33 Directive Type of Healthcare Directive Copy in [address] Agent's Name Healthcare Agent's Phone Number    21 [phone]  No, patient does not have an advance directive for healthcare treatment -- -- -- -- --          Admitting Physician:  Franci Balderas MD  PCP: Winston Galan MD    Discharging Nurse: [facility] Unit/Room#: [facility] Unit Phone Number: ***    Emergency Contact:   Extended Emergency Contact Information  Primary Emergency Contact: Ivan Mercado  Address: [address] [place] [address] Phone: [phone]  Work Phone: [phone]  Mobile Phone: [phone]  Relation: Spouse    Past Surgical History:  Past Surgical History:   Procedure Laterality Date    CARDIAC SURGERY      CARPAL TUNNEL RELEASE Left     COLONOSCOPY  9-10-12    dr Vicki Esteves /2 polyps, diverticulosis, int hemmorrhoids    CORONARY ARTERY BYPASS GRAFT  12    CABG X 3    [place] REVISE MEDIAN N/CARPAL TUNNEL SURG Right 10/30/2018    RIGHT WRIST CARPAL TUNNEL RELEASE SUPINE performed by Madalyn Leslie MD at [facility]       Immunization History:   Immunization History   Administered Date(s) Administered    COVID-19, Vega Peter, PF, 30mcg/0.3mL 2021, 2021    DT (pediatric) 06/15/2009    Influenza Virus Vaccine 2007, 2014    Influenza, High Dose (Fluzone 65 yrs and older) 10/13/2017, 10/06/2018, 10/05/2019    Influenza, Quadv, IM, PF (6 mo and older Fluzone, Flulaval, Fluarix, and 3 yrs and older Afluria) 2016    Pneumococcal Conjugate 13-valent ([id number]) 2018    Pneumococcal Polysaccharide (Frbqpzrka15) 2020    Tdap (Boostrix, Adacel) 2018       Active Problems:  Patient Active Problem List   Diagnosis Code    Anxiety F41.9    Hyperlipidemia E78.5    Sleep apnea G47.30    CAD (coronary artery disease) of artery bypass graft I25.810    Keratosis L57.0    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 G56.00    Spinal stenosis of lumbar region with neurogenic claudication M48.062    Herniated nucleus pulposus, lumbar M51.26    Smoker F17.200    Pre-op examination Z01.818    Lumbar stenosis with neurogenic claudication M48.062       Isolation/Infection:   Isolation          No Isolation        Patient Infection Status     Infection Onset Added Last Indicated Last Indicated By Review Planned Expiration Resolved Resolved By    None active    Resolved    COVID-19 Rule Out 21 COVID-19 Ambulatory (Ordered)   21 Rule-Out Test Resulted          Nurse Assessment:  Last Vital Signs: /77   Pulse 80   Temp 97.4 °F (36.3 °C) (Temporal)   Resp 16   Ht 5' 7\" (1.702 m)   Wt 203 lb (92.1 kg)   SpO2 97%   BMI 31.79 kg/m²     Last documented pain score (0-10 scale): Pain Level: 0(pain upon moving, states 11/10)  Last Weight:   Wt Readings from Last 1 Encounters:   21 203 lb (92.1 kg)     Mental Status:  {IP PT MENTAL STATUS:00266}    IV Access:  { ARACELIS IV ACCESS:105236683}    Nursing Mobility/ADLs:  Walking   {CHP DME NQRQ:758928749}  Transfer  {CHP DME VOSX:517307923}  Bathing  {CHP DME YRO}  Dressing  {CHP DME PSBO:299267420}  Toileting  {CHP DME BNTS:777051478}  Feeding  {CHP DME PORF:706764844}  Med Admin  {P DME HSCD:463051475}  Med Delivery   { ARACELIS MED Delivery:679459037}    Wound Care Documentation and Therapy:        Elimination:  Continence:   · Bowel: {YES / XM:93414}  · Bladder: {YES / GA:06263}  Urinary Catheter: {Urinary Catheter:104067148}   Colostomy/Ileostomy/Ileal Conduit: {YES / RI:43992}       Date of Last BM: ***  No intake or output data in the 24 hours ending 21 0947  No intake/output data recorded.     Safety Concerns:     [address] [facility] Safety Concerns:226958492}    Impairments/Disabilities:      [address] [facility] Impairments/Disabilities:184876359}    Nutrition Therapy:  Current Nutrition Therapy:   [address] Diet List:845970680}    Routes of Feeding: {CHP DME Other Feedings:125311375}  Liquids: {Slp liquid thickness:52472}  Daily Fluid Restriction: {CHP DME Yes amt example:673228230}  Last Modified Barium Swallow with Video (Video Swallowing Test): {Done Not Done JYXN:774073321}    Treatments at the Time of Hospital Discharge:   Respiratory Treatments: ***  Oxygen Therapy:  {Therapy; copd oxygen:71106}  Ventilator:    { CC Vent KUFE:975355076}    Rehab Therapies: {THERAPEUTIC INTERVENTION:7581573407}  Weight Bearing Status/Restrictions: [address]  Weight Bearin}  Other Medical Equipment (for information only, NOT a DME order):  {EQUIPMENT:606861457}  Other Treatments: ***    Patient's personal belongings (please select all that are sent with patient):  {[facility] DME Belongings:058160035}    RN SIGNATURE:  {Esignature:446850955}    CASE MANAGEMENT/SOCIAL WORK SECTION    Inpatient Status Date: ***    Readmission Risk Assessment Score:  Readmission Risk              Risk of Unplanned Readmission:        0           Discharging to Facility/ Agency   · Name:   · Address:  · Phone:  · Fax:    Dialysis Facility (if applicable)   · Name:  · Address:  · Dialysis Schedule:  · Phone:  · Fax:    / signature: {Esignature:024087480}    PHYSICIAN SECTION    Prognosis: {Prognosis:2436902691}    Condition at Discharge: [address] Patient Condition:522700168}    Rehab Potential (if transferring to Rehab): {Prognosis:0255017917}    Recommended Labs or Other Treatments After Discharge: ***    Physician Certification: I certify the above information and transfer of Kelley Flaherty  is necessary for the continuing treatment of the diagnosis listed and that he requires {Admit to Appropriate Level of Care:98086} for {GREATER/LESS:611134941} 30 days.      Update Admission H&P: {CHP DME Changes in NWY:456506327}    PHYSICIAN SIGNATURE:  Electronically signed by Facundo Nick MD on 5/5/21 at 9:47 AM EDT

## 2021-05-05 NOTE — ANESTHESIA POSTPROCEDURE EVALUATION
Department of Anesthesiology  Postprocedure Note    Patient: Jessica Major  MRN: [id number]  [place]: 1950  Date of evaluation: 5/5/2021  Time:  9:53 AM     Procedure Summary     Date: 05/05/21 Room / Location: [address]    Anesthesia Start: 0827 Anesthesia Stop: 7800    Procedure: BILATERAL L 3-4 MICRODECOMPRESSION (Bilateral ) Diagnosis: (L3-4 STENOSIS, HERNIATED NUCLEUS PULPOSUS)    Surgeons: Bere Whalen MD Responsible Provider: Ness Blair DO    Anesthesia Type: general, regional ASA Status: 3          Anesthesia Type: general, regional    Marion Phase I:      Marion Phase II:      Last vitals: Reviewed and per EMR flowsheets.        Anesthesia Post Evaluation    Patient location during evaluation: PACU  Patient participation: complete - patient participated  Level of consciousness: awake and alert  Pain score: 1  Airway patency: patent  Nausea & Vomiting: no nausea and no vomiting  Complications: no  Cardiovascular status: hemodynamically stable  Respiratory status: acceptable and face mask  Hydration status: euvolemic  Comments: Report to RN, normal sinus rhythm

## 2021-05-05 NOTE — ANESTHESIA PROCEDURE NOTES
Peripheral Block    Patient location during procedure: pre-op  Start time: 5/5/2021 7:03 AM  End time: 5/5/2021 7:13 AM  Staffing  Performed: anesthesiologist   Anesthesiologist: Dorothey Lefort, DO  Preanesthetic Checklist  Completed: patient identified, IV checked, site marked, risks and benefits discussed, surgical consent, monitors and equipment checked, pre-op evaluation, timeout performed, anesthesia consent given, oxygen available and patient being monitored  Peripheral Block  Patient position: supine  Prep: ChloraPrep  Patient monitoring: cardiac monitor, continuous pulse ox, frequent blood pressure checks and IV access  Block type: Erector spinae (Lumbar 3-4)  Laterality: bilateral  Injection technique: single-shot  Guidance: ultrasound guided  Local infiltration: ropivacaine  Infiltration strength: 0.5 %  Dose: 20 mL  Provider prep: mask and sterile gloves (Sterile probe cover)  Local infiltration: ropivacaine  Needle  Needle type: combined needle/nerve stimulator   Needle gauge: 22 G  Needle length: 10 cm  Needle localization: anatomical landmarks and ultrasound guidance  Assessment  Injection assessment: negative aspiration for heme, no paresthesia on injection and local visualized surrounding nerve on ultrasound  Paresthesia pain: immediately resolved  Slow fractionated injection: yes  Hemodynamics: stable  Additional Notes  Ultrasound image printed and saved in patient chart.     Sterile probe cover used      Ropivacaine 0.5% 20 ml + lidocaine 1% with epi 1:100 k 10 ml    15 ml bilateral      Reason for block: post-op pain management and at surgeon's request

## 2021-05-06 PROBLEM — M48.062 SPINAL STENOSIS, LUMBAR REGION WITH NEUROGENIC CLAUDICATION: Status: ACTIVE | Noted: 2021-05-06

## 2021-05-06 PROCEDURE — 97162 PT EVAL MOD COMPLEX 30 MIN: CPT

## 2021-05-06 PROCEDURE — 6370000000 HC RX 637 (ALT 250 FOR IP): Performed by: NEUROLOGICAL SURGERY

## 2021-05-06 PROCEDURE — G0378 HOSPITAL OBSERVATION PER HR: HCPCS

## 2021-05-06 PROCEDURE — 96376 TX/PRO/DX INJ SAME DRUG ADON: CPT

## 2021-05-06 PROCEDURE — 2580000003 HC RX 258: Performed by: NEUROLOGICAL SURGERY

## 2021-05-06 PROCEDURE — 96365 THER/PROPH/DIAG IV INF INIT: CPT

## 2021-05-06 PROCEDURE — 96375 TX/PRO/DX INJ NEW DRUG ADDON: CPT

## 2021-05-06 PROCEDURE — 6370000000 HC RX 637 (ALT 250 FOR IP): Performed by: NURSE PRACTITIONER

## 2021-05-06 PROCEDURE — 6360000002 HC RX W HCPCS: Performed by: NEUROLOGICAL SURGERY

## 2021-05-06 RX ADMIN — TRAZODONE HYDROCHLORIDE 50 MG: 50 TABLET ORAL at 20:53

## 2021-05-06 RX ADMIN — OXYCODONE 5 MG: 5 TABLET ORAL at 20:53

## 2021-05-06 RX ADMIN — LISINOPRIL 20 MG: 20 TABLET ORAL at 08:31

## 2021-05-06 RX ADMIN — OXYCODONE 5 MG: 5 TABLET ORAL at 16:49

## 2021-05-06 RX ADMIN — OXYCODONE 5 MG: 5 TABLET ORAL at 03:26

## 2021-05-06 RX ADMIN — MAGNESIUM HYDROXIDE 30 ML: 400 SUSPENSION ORAL at 03:29

## 2021-05-06 RX ADMIN — BISACODYL 5 MG: 5 TABLET, COATED ORAL at 08:31

## 2021-05-06 RX ADMIN — POLYETHYLENE GLYCOL 3350 17 G: 17 POWDER, FOR SOLUTION ORAL at 08:32

## 2021-05-06 RX ADMIN — ATORVASTATIN CALCIUM 80 MG: 80 TABLET, FILM COATED ORAL at 20:53

## 2021-05-06 RX ADMIN — HYDROMORPHONE HYDROCHLORIDE 0.5 MG: 1 INJECTION, SOLUTION INTRAMUSCULAR; INTRAVENOUS; SUBCUTANEOUS at 00:34

## 2021-05-06 RX ADMIN — LISINOPRIL 20 MG: 20 TABLET ORAL at 20:53

## 2021-05-06 RX ADMIN — METOPROLOL SUCCINATE 50 MG: 50 TABLET, EXTENDED RELEASE ORAL at 08:31

## 2021-05-06 RX ADMIN — SODIUM CHLORIDE, PRESERVATIVE FREE 10 ML: 5 INJECTION INTRAVENOUS at 21:00

## 2021-05-06 RX ADMIN — OXYCODONE 5 MG: 5 TABLET ORAL at 08:31

## 2021-05-06 RX ADMIN — HYDROMORPHONE HYDROCHLORIDE 0.5 MG: 1 INJECTION, SOLUTION INTRAMUSCULAR; INTRAVENOUS; SUBCUTANEOUS at 05:55

## 2021-05-06 RX ADMIN — OXYCODONE 5 MG: 5 TABLET ORAL at 12:42

## 2021-05-06 RX ADMIN — HYDROMORPHONE HYDROCHLORIDE 0.5 MG: 1 INJECTION, SOLUTION INTRAMUSCULAR; INTRAVENOUS; SUBCUTANEOUS at 18:02

## 2021-05-06 ASSESSMENT — PAIN DESCRIPTION - PAIN TYPE: TYPE: SURGICAL PAIN

## 2021-05-06 ASSESSMENT — PAIN SCALES - GENERAL
PAINLEVEL_OUTOF10: 8
PAINLEVEL_OUTOF10: 9
PAINLEVEL_OUTOF10: 5
PAINLEVEL_OUTOF10: 5

## 2021-05-06 ASSESSMENT — PAIN DESCRIPTION - LOCATION
LOCATION: BACK
LOCATION: BACK

## 2021-05-06 ASSESSMENT — PAIN DESCRIPTION - DESCRIPTORS: DESCRIPTORS: SORE

## 2021-05-06 NOTE — PROGRESS NOTES
Department of Internal Medicine  General Internal Medicine  Attending Progress Note      SUBJECTIVE:  Pt seen and examined. Medically stable. States pain is too severe for discharge. Plan is for discharge tomorrow if pain improved    OBJECTIVE      Medications    Current Facility-Administered Medications: 0.9 % sodium chloride infusion, , Intravenous, Continuous  sodium chloride flush 0.9 % injection 10 mL, 10 mL, Intravenous, 2 times per day  sodium chloride flush 0.9 % injection 10 mL, 10 mL, Intravenous, PRN  0.9 % sodium chloride infusion, 25 mL, Intravenous, PRN  acetaminophen (TYLENOL) tablet 650 mg, 650 mg, Oral, Q4H PRN  oxyCODONE (ROXICODONE) immediate release tablet 5 mg, 5 mg, Oral, Q6H PRN **OR** oxyCODONE (ROXICODONE) immediate release tablet 5 mg, 5 mg, Oral, Q4H PRN  HYDROmorphone (DILAUDID) injection 0.25 mg, 0.25 mg, Intravenous, Q3H PRN **OR** HYDROmorphone (DILAUDID) injection 0.5 mg, 0.5 mg, Intravenous, Q3H PRN  ondansetron (ZOFRAN) injection 4 mg, 4 mg, Intravenous, Q6H PRN  bisacodyl (DULCOLAX) EC tablet 5 mg, 5 mg, Oral, Daily  magnesium hydroxide (MILK OF MAGNESIA) 400 MG/5ML suspension 30 mL, 30 mL, Oral, Daily PRN  polyethylene glycol (GLYCOLAX) packet 17 g, 17 g, Oral, Daily PRN  atorvastatin (LIPITOR) tablet 80 mg, 80 mg, Oral, Nightly  lisinopril (PRINIVIL;ZESTRIL) tablet 20 mg, 20 mg, Oral, BID  metoprolol succinate (TOPROL XL) extended release tablet 50 mg, 50 mg, Oral, Daily  traZODone (DESYREL) tablet 50 mg, 50 mg, Oral, Nightly  Physical    VITALS:  BP (!) 123/59   Pulse 82   Temp 97.5 °F (36.4 °C) (Oral)   Resp 18   Ht 5' 7\" (1.702 m)   Wt 203 lb (92.1 kg)   SpO2 99%   BMI 31.79 kg/m²   Constitutional: Awake and alert in no acute distress.  Sitting in chair comfortably  Head: Normocephalic, atraumatic  Eyes: EOMI, PERRLA  ENT: moist mucous membranes  Neck: neck supple, trachea midline  Lungs: Good inspiratory effort, CTABL, no wheeze, no rhonchi, no rales  Heart: RRR, normal S1 and S2  GI: Soft, non-distended, non tender, no guarding, no rebound, +BS  MSK: no edema noted  Skin: warm, dry  Psych: appropriate affect       ASSESSMENT AND PLAN      1. Spinal stenosis of lumbar region with neurogenic claudication status post bilateral L3-L4 microdissection, decompression and diskectomy. Postsurgical and pain management per neurosurgery. Continue neurovascular checks. Monitor for signs symptoms of urinary retention. Incentive spirometry. Wound care instructions ordered per neurosurgery  2. Hypertension/hyperlipidemia/CAD: Continue atorvastatin, lisinopril and metoprolol. Resume aspirin upon discharge. 3. Anxiety and sleep disturbance: Continue trazodone 50 mg nightly    Disposition: Pt medically stable. Discharge per primary service.       Awa Lara DO  Internal Medicine

## 2021-05-06 NOTE — CARE COORDINATION
Patient plans to d/c home tomorrow. No needs identified. CM to follow for any new d/c needs or changes to the d/c plan.

## 2021-05-06 NOTE — DISCHARGE SUMMARY
Deepa [address]                      [facility], [address] [facility]                               DISCHARGE SUMMARY    PATIENT NAME: Lane Ontiveros                     :        1950  MED REC NO:   [id number]                            ROOM:       [id number]  ACCOUNT NO:   [de-identified]                           ADMIT DATE: 2021  PROVIDER:     Zenon Carrera MD                      [facility] DATE: 2021, bilateral L4-L5 microdecompression discectomy, the patient  tolerated the procedure well. Once ambulatory with bladder control,  plan discharge in good condition. Hospitalist consulted for evaluation  and treatment of multiple medications, comorbidities. DISCHARGE DIAGNOSES:  1. L3-L4 canal stenosis and protruded disc, improved. 2.  Coronary artery disease, status post bypass graft. 3.  Hypertension. DISCHARGE INSTRUCTIONS:  The patient was instructed to keep the wound  clean and dry by changing the dressing frequently. Shower in 3 days,  avoiding soaking or soap for a week, recheck in a month. DISCHARGE MEDICATION:  Prescription for Norco  #28 for discharge. If he has any questions or problems, please contact the office.   Discharged to home 2021    Sherita Zhao MD    D: 2021 9:53:29       T: 2021 10:00:52     [email or web address]  Job#: [id number]     Doc#: [id number]    CC:

## 2021-05-06 NOTE — PROGRESS NOTES
Patient has much less pain into the lower extremities and improved function with strength compared to preop. Back is very sore with difficulty with ambulation. Planning discharge tomorrow. All questions answered.

## 2021-05-06 NOTE — FLOWSHEET NOTE
3029- Shift assessment completed at this time, pt A&OX4, denies chest pain/SOB, denies nausea/vomiting, denies blurry vision/double vision, strength equal in bilateral upper and lower extremities, pt complains of numbness to right lower extremity with pt states is not new, dressing to mid lower back with scant amount of drainage present, pt states pain is 7/10 in back will medicate accordingly, blood pressure noted to elevated pt takes scheduled BP meds, pt denies further needs at this time-KGW  0845- Pt to bathroom to get washed up, pt tolerated well with ambulation-KGW  0915- Dressing changed to mid lower back, 4x4 with tegaderm, pt tolerated dressing change well-KGW  1322- Pt up in chair with no complaints at this time-KGW  Electronically signed by La Styles RN on 5/6/2021

## 2021-05-06 NOTE — PROGRESS NOTES
Overall Sensation Status: Impaired  Additional Comments: pt irwin numbness in R foot and toes    Bed mobility  Supine to Sit: Supervision  Sit to Supine: Supervision    Transfers  Sit to Stand: Supervision  Stand to sit: Supervision    Ambulation  Ambulation?: Yes  Ambulation 1  Surface: level tile  Device: Rolling Walker  Assistance: Supervision  Quality of Gait: reciprocal, increased UE support on 2ww  Gait Deviations: Decreased head and trunk rotation;Decreased step length;Decreased step height;Decreased arm swing  Distance: 50ft with turns in room environment    Stairs/Curb  Stairs?: No(to be assessed 5/7-pt limited by pain today)     Activity Tolerance  Activity Tolerance: Patient Tolerated treatment well      PT Education  PT Education: Goals;PT Role;Plan of Care    ASSESSMENT:   Body structures, Functions, Activity limitations: Decreased functional mobility ; Decreased ROM; Decreased strength  Decision Making: Medium Complexity  History: med  Exam: med  Clinical Presentation: med    Specific instructions for Next Treatment: gait/stairs  Prognosis: Good  Barriers to Learning: none    DISCHARGE RECOMMENDATIONS:  Discharge Recommendations: Continue to assess pending progress    Assessment: Pt demonstrates the above deficits and decline in functional mobility s/p lumbar micro decompression. Pt is limited by pain levels and would benefit from obtaining 2ww. Pt ed in recommendation and is agreeable to obtain 2ww. Pt would benefit from physical therapy to address above deficits and allow for safe return home at highest level of function, decrease risk for falls, and improve QOL.     REQUIRES PT FOLLOW UP: Yes      PLAN OF CARE:  Plan  Times per week: 2-3 follow up  Times per day: Daily  Specific instructions for Next Treatment: gait/stairs  Current Treatment Recommendations: Strengthening, Gait Training, Stair training, Balance Training, Functional Mobility Training, Safety Education & Training, Home Exercise Program, Cognitive Reorientation, Patient/Caregiver Education & Training, Equipment Evaluation, Education, & procurement, Neuromuscular Re-education, Modalities, Positioning  Safety Devices  Type of devices: Left in chair, Call light within reach, Nurse notified    Goals:  Patient goals : \"go home\"  Long term goals  Long term goal 1: Bed mobility with indep  Long term goal 2: Functional transfers with indep  Long term goal 3: Amb 75ft with 2ww and indep  Long term goal 4: navigate >/=12 steps with handrail with SBA  Long term goal 5: 1 curb step with 2ww and sba to enter/exit home    [facility] ([address] [place] [address]) [address] Raw Score : 18     Therapy Time:   Individual   Time In 0903   Time Out 0915   Minutes Calin Contreras [address] [place] [address], 05/06/21 at 9:27 AM         Definitions for assistance levels  Independent = pt does not require any physical supervision or assistance from another person for activity completion. Device may be needed.   Stand by assistance = pt requires verbal cues or instructions from another person, close to but not touching, to perform the activity  Minimal assistance= pt performs 75% or more of the activity; assistance is required to complete the activity  Moderate assistance= pt performs 50% of the activity; assistance is required to complete the activity  Maximal assistance = pt performs 25% of the activity; assistance is required to complete the activity  Dependent = pt requires total physical assistance to accomplish the task

## 2021-05-07 VITALS
DIASTOLIC BLOOD PRESSURE: 57 MMHG | TEMPERATURE: 98.2 F | WEIGHT: 203 LBS | SYSTOLIC BLOOD PRESSURE: 108 MMHG | HEIGHT: 67 IN | HEART RATE: 78 BPM | OXYGEN SATURATION: 98 % | RESPIRATION RATE: 18 BRPM | BODY MASS INDEX: 31.86 KG/M2

## 2021-05-07 PROCEDURE — 6370000000 HC RX 637 (ALT 250 FOR IP): Performed by: NEUROLOGICAL SURGERY

## 2021-05-07 PROCEDURE — 97116 GAIT TRAINING THERAPY: CPT

## 2021-05-07 PROCEDURE — 2580000003 HC RX 258: Performed by: NEUROLOGICAL SURGERY

## 2021-05-07 PROCEDURE — 1210000000 HC MED SURG R&B

## 2021-05-07 PROCEDURE — 97535 SELF CARE MNGMENT TRAINING: CPT

## 2021-05-07 PROCEDURE — 6370000000 HC RX 637 (ALT 250 FOR IP): Performed by: NURSE PRACTITIONER

## 2021-05-07 RX ADMIN — OXYCODONE 5 MG: 5 TABLET ORAL at 07:32

## 2021-05-07 RX ADMIN — BISACODYL 5 MG: 5 TABLET, COATED ORAL at 07:32

## 2021-05-07 RX ADMIN — MAGNESIUM HYDROXIDE 30 ML: 400 SUSPENSION ORAL at 03:07

## 2021-05-07 RX ADMIN — OXYCODONE 5 MG: 5 TABLET ORAL at 11:50

## 2021-05-07 RX ADMIN — OXYCODONE 5 MG: 5 TABLET ORAL at 03:03

## 2021-05-07 RX ADMIN — ACETAMINOPHEN 650 MG: 325 TABLET ORAL at 07:32

## 2021-05-07 RX ADMIN — METOPROLOL SUCCINATE 50 MG: 50 TABLET, EXTENDED RELEASE ORAL at 07:32

## 2021-05-07 RX ADMIN — POLYETHYLENE GLYCOL 3350 17 G: 17 POWDER, FOR SOLUTION ORAL at 07:32

## 2021-05-07 RX ADMIN — LISINOPRIL 20 MG: 20 TABLET ORAL at 07:32

## 2021-05-07 RX ADMIN — ACETAMINOPHEN 650 MG: 325 TABLET ORAL at 11:52

## 2021-05-07 RX ADMIN — SODIUM CHLORIDE, PRESERVATIVE FREE 10 ML: 5 INJECTION INTRAVENOUS at 07:33

## 2021-05-07 RX ADMIN — ACETAMINOPHEN 650 MG: 325 TABLET ORAL at 03:03

## 2021-05-07 ASSESSMENT — PAIN SCALES - GENERAL
PAINLEVEL_OUTOF10: 6
PAINLEVEL_OUTOF10: 6
PAINLEVEL_OUTOF10: 9

## 2021-05-07 ASSESSMENT — PAIN DESCRIPTION - PAIN TYPE: TYPE: SURGICAL PAIN

## 2021-05-07 ASSESSMENT — PAIN DESCRIPTION - ORIENTATION: ORIENTATION: LOWER

## 2021-05-07 ASSESSMENT — PAIN DESCRIPTION - LOCATION: LOCATION: BACK

## 2021-05-07 ASSESSMENT — PAIN DESCRIPTION - DESCRIPTORS: DESCRIPTORS: SORE

## 2021-05-07 NOTE — CARE COORDINATION
REMIW spoke with the pt regarding his DC today. Pt plans to go home and he needs a walker at DC. REMIW faxed information to the [facility] for review to see if the pt can get it today on the way home. Pt will not have any other needs for DC.

## 2021-05-07 NOTE — FLOWSHEET NOTE
1563- Shift assessment completed at this time, pt A&OX4, denies chest pain/SOB, denies nausea/vomiting, pt complains of feeling constipated, pt received milk of magnesia per night shift nurse, pt receiving dulcolax pill, will medicate with PRN miralax, VSS, pt denies difficulty with urinating, strength equal in bilateral upper and lower extremities, pt complains of numbness in legs that is improving since surgery, no edema noted, dressing to mid lower back clean, dry, and intact, pt states pain is 7/10 will medicate accordingly, pt denies further needs at this time-KGW  1047- Discharge instructions gone over with patient, pt denies any questions at this time, explained to pt how to change dressing, follow up appointments made and addressed-KGW  1215- Pt discharged home, escorted to main entrance via transporter with wheelchair-KGW  Electronically signed by Jack Gutierrez RN on 5/7/2021

## 2021-05-07 NOTE — FLOWSHEET NOTE
Shift assessment complete. A/Ox4. VSS. Denies chest pain, shortness of breath or nausea. Numbness to Lower extremities noted, present prior to surgery. Patient ambulating with walker in room independently. Dressing to mid lower back clean, dry and intact. Medicated per [facility] for 8/10 pain.

## 2021-05-07 NOTE — PROGRESS NOTES
Physical Therapy Med Surg Daily Treatment Note  Fac[facility]/Department: [facility]  Room: N221/N221-01       NAME: Erlinda Dickson  : 1950 (73 y.o.)  MRN: [id number]  CODE STATUS: Full Code    Date of Service: 2021    Patient Diagnosis(es): Lumbar stenosis with neurogenic claudication [M48.062]  Spinal stenosis, lumbar region with neurogenic claudication [M48.062]   No chief complaint on file.     Patient Active Problem List    Diagnosis Date Noted    Spinal stenosis, lumbar region with neurogenic claudication 2021    Lumbar stenosis with neurogenic claudication 2021    Pre-op examination 2021    Spinal stenosis of lumbar region with neurogenic claudication 2021    Herniated nucleus pulposus, lumbar 2021    Smoker 2021    Carpal tunnel syndrome 10/22/2018    Dyshidrotic eczema 2018    Varicose veins of both lower extremities 2018    Essential hypertension 2016    Local neurodermatitis 2016    Keratosis     CAD (coronary artery disease) of artery bypass graft 2012    Anxiety     Hyperlipidemia     Sleep apnea         Past Medical History:   Diagnosis Date    Anxiety     CAD (coronary artery disease)     had CABG    HIGH CHOLESTEROL     Hypercholesterolemia     Hyperlipidemia     Hypertension     Keratosis     Neuropathy     Psoriasis     Sleep apnea      Past Surgical History:   Procedure Laterality Date    CARDIAC SURGERY      CARPAL TUNNEL RELEASE Left     COLONOSCOPY  9-10-12    dr Roa Credit /2 polyps, diverticulosis, int hemmorrhoids    CORONARY ARTERY BYPASS GRAFT  12    CABG X 3    LAMINECTOMY Bilateral 2021    BILATERAL L 3-4 MICRODECOMPRESSION performed by Leny Garay MD at [facility] N/CARPAL TUNNEL SURG Right 10/30/2018    RIGHT WRIST CARPAL TUNNEL RELEASE SUPINE performed by Monica Yates MD at [facility] [address]       Restrictions Restrictions/Precautions: Fall Risk(moderate fall risk per [facility] KAYLIN score)    SUBJECTIVE   General  Chart Reviewed: Yes  Family / Caregiver Present: No  Subjective  Subjective: \"I need to go to the bathroom but I can't. \"    Pre-Session Pain Report  Pre Treatment Pain Screening  Pain at present: 5  Intervention List: Patient able to continue with treatment;Patient declined any intervention  Comments / Details: Pt reports receiving pain medications this AM  Pain Screening  Patient Currently in Pain: Yes       Post-Session Pain Report  Pain Assessment  Pain Assessment: 0-10  Pain Level: 5  Pain Type: Surgical pain  Pain Location: Back  Pain Orientation: Lower  Pain Descriptors: Sore         OBJECTIVE        Bed mobility  Supine to Sit: Unable to assess  Sit to Supine: Unable to assess  Comment: Pt up in chair pre/post tx    Transfers  Sit to Stand: Supervision  Stand to sit: Supervision  Car Transfer: Supervision(VC's and visual demonstration provided for proper technique with good follow through)  Comment: VC's for proper hand placement with fair carryover. Education provided on importance of reaching back with UE's for chair prior to sitting to control descend and reduce LBP d/t incision. Ambulation  Ambulation?: Yes  Ambulation 1  Surface: level tile  Device: Rolling Walker  Assistance: Supervision  Quality of Gait: reciprocal, guarded, decreased giuliano heel strike, decreased step length  Gait Deviations: Decreased step length;Decreased head and trunk rotation  Distance: 50' x 2  Comments: vc's for decreased reliance on UE with [address]    Stairs/Curb  Stairs?: Yes  Stairs  # Steps : 4  Stairs Height: 6\"  Rails: Bilateral  Curbs: 6\"  Device: Rolling walker  Assistance: Supervision  Comment: non reciprocal, no LOB on stairs. VC/visual demonstration for curb step. Good carryover. No LOB.      Exercises  Gluteal Sets: x5  Hip Flexion: x5  Hip Abduction: x5 side kicks  Knee Long Arc Quad: x5  Ankle Pumps: x5         ASSESSMENT Assessment: Pt exhibits improved strength and endurance this session secondary to increasing overall ambulatory distance and performing stair training. VC's for safety needed throughout tx during trsfs d/t pt not utilizing UE's prior to sitting. Education provided on benefits of reaching BUE's back and controling descend into chair. HEP given and reviewed with pt, pt demonstrates an understanding of HEP by performing ther ex with minimal cues. Discharge Recommendations:  Continue to assess pending progress    Goals  Long term goals  Long term goal 1: Bed mobility with indep  Long term goal 2: Functional transfers with indep  Long term goal 3: Amb 75ft with 2ww and indep  Long term goal 4: navigate >/=12 steps with handrail with SBA  Long term goal 5: 1 curb step with 2ww and sba to enter/exit home  Patient Goals   Patient goals : \"go home\"    PLAN    Times per week: 2-3 follow up  Times per day: Daily  Plan Comment: Cont. POC  Safety Devices  Type of devices: Call light within reach, Left in chair, Nurse notified     [facility] (6 CLICK) [id number] Mobility Raw Score : 18     Therapy Time   Individual   Time In 0857   Time Out 0935   Minutes 38      BM/Trsf: 15  Gait: 17   There ex: [address], PTA, 05/07/21 at 10:27 AM         Definitions for assistance levels  Independent = pt does not require any physical supervision or assistance from another person for activity completion. Device may be needed.   Stand by assistance = pt requires verbal cues or instructions from another person, close to but not touching, to perform the activity  Minimal assistance= pt performs 75% or more of the activity; assistance is required to complete the activity  Moderate assistance= pt performs 50% of the activity; assistance is required to complete the activity  Maximal assistance = pt performs 25% of the activity; assistance is required to complete the activity  Dependent = pt requires total physical

## 2021-05-10 ENCOUNTER — TELEPHONE (OUTPATIENT)
Dept: FAMILY MEDICINE CLINIC | Age: 71
End: 2021-05-10

## 2021-05-10 NOTE — PROGRESS NOTES
Physical Therapy  [facility]Department[facility] MED SURG N221/N221-01  Physical Therapy Discharge      NAME: Scooter Glover    : 1950 (20 y.o.)  MRN: [id number]    Account: [de-identified]  Gender: male      Patient has been discharged from acute care hospital. DC patient from current PT program.      Electronically signed by Rhonda Garcia PT on 5/10/21 at 11:47 AM EDT

## 2021-05-10 NOTE — TELEPHONE ENCOUNTER
[facility] Transitions Initial Follow Up Call    Outreach made within 2 business days of discharge: Yes    Patient: Carlos Jolly Patient : 1950   MRN: [id number]  Reason for Admission: There are no discharge diagnoses documented for the most recent discharge. Discharge Date: 21       Spoke with: PATIENT    Discharge department/facility: [facility]    TCM Interactive Patient Contact:  Was patient able to fill all prescriptions: Yes  Was patient instructed to bring all medications to the follow-up visit: Yes  Is patient taking all medications as directed in the discharge summary?  Yes  Does patient understand their discharge instructions: Yes  Does patient have questions or concerns that need addressed prior to 7-14 day follow up office visit: no    Scheduled appointment with PCP within 7-14 days - PATIENT AWARE OF APPT DETAILS    Follow Up  Future Appointments   Date Time Provider Cirilo Lozano   2021 10:30 AM Jeison Valdez  [place] [address]   2021  8:00 AM Chari Ochoa APRN - CNP [facility]   2021 10:30 AM Nohemy Pepper, PT [facility] None   2021  1:00 PM Virginia Gil MD [facility]   10/6/2021  9:30 AM Benja Donato MD [address] [place] [address]

## 2021-05-30 PROBLEM — Z01.818 PRE-OP EXAMINATION: Status: RESOLVED | Noted: 2021-04-30 | Resolved: 2021-05-30

## 2021-06-07 DIAGNOSIS — M48.062 SPINAL STENOSIS OF LUMBAR REGION WITH NEUROGENIC CLAUDICATION: Primary | ICD-10-CM

## 2021-06-08 ENCOUNTER — OFFICE VISIT (OUTPATIENT)
Dept: NEUROSURGERY | Age: 71
End: 2021-06-08

## 2021-06-08 VITALS
BODY MASS INDEX: 30.92 KG/M2 | HEIGHT: 67 IN | HEART RATE: 94 BPM | TEMPERATURE: 97.6 F | DIASTOLIC BLOOD PRESSURE: 66 MMHG | WEIGHT: 197 LBS | SYSTOLIC BLOOD PRESSURE: 130 MMHG

## 2021-06-08 DIAGNOSIS — M48.062 SPINAL STENOSIS OF LUMBAR REGION WITH NEUROGENIC CLAUDICATION: Primary | ICD-10-CM

## 2021-06-08 PROCEDURE — 99024 POSTOP FOLLOW-UP VISIT: CPT | Performed by: NEUROLOGICAL SURGERY

## 2021-06-08 NOTE — PROGRESS NOTES
[facility]) Physicians  Neurosurgery and Pain [facility]  [address] [facility], [address] [facility] , [address] [facility], [facility]: [phone]  F: [phone]      Patient: Palmer Garcia  YOB: 1950  Date: 6/8/2021    The patient is a 73 y.o. male who presents today for follow up.    5/5/2021 bilateral L3-4 microdecompression    Patient is doing well postoperatively and he is glad he had the operation done. The pain in his legs is all gone and the very severe low back pain is also greatly improved. He has some morning stiffness from his osteoarthritis. Exam shows he gets up easily walks well including regular walk toe walk heel walk. Wound is healing well. He should start his physical therapy per home instructions if he has any questions or problems he will contact the office.   plan physical therapy    Newton Calderón MD

## 2021-06-10 ENCOUNTER — OFFICE VISIT (OUTPATIENT)
Dept: FAMILY MEDICINE CLINIC | Age: 71
End: 2021-06-10
Payer: MEDICARE

## 2021-06-10 VITALS
HEIGHT: 67 IN | HEART RATE: 84 BPM | BODY MASS INDEX: 31.23 KG/M2 | DIASTOLIC BLOOD PRESSURE: 74 MMHG | SYSTOLIC BLOOD PRESSURE: 136 MMHG | OXYGEN SATURATION: 98 % | WEIGHT: 199 LBS | TEMPERATURE: 98 F | RESPIRATION RATE: 16 BRPM

## 2021-06-10 DIAGNOSIS — Z00.00 ROUTINE GENERAL MEDICAL EXAMINATION AT A HEALTH CARE FACILITY: Primary | ICD-10-CM

## 2021-06-10 PROCEDURE — 4040F PNEUMOC VAC/ADMIN/RCVD: CPT | Performed by: FAMILY MEDICINE

## 2021-06-10 PROCEDURE — 1123F ACP DISCUSS/DSCN MKR DOCD: CPT | Performed by: FAMILY MEDICINE

## 2021-06-10 PROCEDURE — 3017F COLORECTAL CA SCREEN DOC REV: CPT | Performed by: FAMILY MEDICINE

## 2021-06-10 PROCEDURE — G0438 PPPS, INITIAL VISIT: HCPCS | Performed by: FAMILY MEDICINE

## 2021-06-10 ASSESSMENT — PATIENT HEALTH QUESTIONNAIRE - PHQ9
SUM OF ALL RESPONSES TO PHQ9 QUESTIONS 1 & 2: 0
SUM OF ALL RESPONSES TO PHQ QUESTIONS 1-9: 0
SUM OF ALL RESPONSES TO PHQ QUESTIONS 1-9: 0
1. LITTLE INTEREST OR PLEASURE IN DOING THINGS: 0
SUM OF ALL RESPONSES TO PHQ QUESTIONS 1-9: 0
2. FEELING DOWN, DEPRESSED OR HOPELESS: 0

## 2021-06-10 NOTE — PATIENT INSTRUCTIONS
Personalized Preventive Plan for Ary Babinski - 6/10/2021  Medicare offers a range of preventive health benefits. Some of the tests and screenings are paid in full while other may be subject to a deductible, co-insurance, and/or copay. Some of these benefits include a comprehensive review of your medical history including lifestyle, illnesses that may run in your family, and various assessments and screenings as appropriate. After reviewing your medical record and screening and assessments performed today your provider may have ordered immunizations, labs, imaging, and/or referrals for you. A list of these orders (if applicable) as well as your Preventive Care list are included within your After Visit Summary for your review. Other Preventive Recommendations:    · A preventive eye exam performed by an eye specialist is recommended every 1-2 years to screen for glaucoma; cataracts, macular degeneration, and other eye disorders. · A preventive dental visit is recommended every 6 months. · Try to get at least 150 minutes of exercise per week or 10,000 steps per day on a pedometer . · Order or download the FREE \"Exercise & Physical Activity: Your Everyday Guide\" from [employer]. Call 2-418.549.9286 or search [employer] online. · You need 5727-0031 mg of calcium and 2760-9846 IU of vitamin D per day. It is possible to meet your calcium requirement with diet alone, but a vitamin D supplement is usually necessary to meet this goal.  · When exposed to the sun, use a sunscreen that protects against both UVA and UVB radiation with an SPF of 30 or greater. Reapply every 2 to 3 hours or after sweating, drying off with a towel, or swimming. · Always wear a seat belt when traveling in a car. Always wear a helmet when riding a bicycle or motorcycle. Heart-Healthy Diet   Sodium, Fat, and Cholesterol Controlled Diet       What Is a Heart Healthy Diet?    A heart-healthy cholesterol, this type of cholesterol actually carries cholesterol away from your arteries and may, therefore, help lower your risk of having a heart attack. You want this level to be high (ideally greater than 60). It is a risk to have a level less than 40. You can raise this good cholesterol by eating olive oil, canola oil, avocados, or nuts. Exercise raises this level, too. Fat    Fat is calorie dense and packs a lot of calories into a small amount of food. Even though fats should be limited due to their high calorie content, not all fats are bad. In fact, some fats are quite healthful. Fat can be broken down into four main types. The good-for-you fats are:   Monounsaturated fat  found in oils such as olive and canola, avocados, and nuts and natural nut butters; can decrease cholesterol levels, while keeping levels of HDL cholesterol high   Polyunsaturated fat  found in oils such as safflower, sunflower, soybean, corn, and sesame; can decrease total cholesterol and LDL cholesterol   Omega-3 fatty acids  particularly those found in fatty fish (such as salmon, trout, tuna, mackerel, herring, and sardines); can decrease risk of arrhythmias, decrease triglyceride levels, and slightly lower blood pressure   The fats that you want to limit are:   Saturated fat  found in animal products, many fast foods, and a few vegetables; increases total blood cholesterol, including LDL levels   Animal fats that are saturated include: butter, lard, whole-milk dairy products, meat fat, and poultry skin   Vegetable fats that are saturated include: hydrogenated shortening, palm oil, coconut oil, cocoa butter   Hydrogenated or trans fat  found in margarine and vegetable shortening, most shelf stable snack foods, and fried foods; increases LDL and decreases HDL     It is generally recommended that you limit your total fat for the day to less than 30% of your total calories.  If you follow an 1800-calorie heart healthy diet, for example, this would mean 60 grams of fat or less per day. Saturated fat and trans fat in your diet raises your blood cholesterol the most, much more than dietary cholesterol does. For this reason, on a heart-healthy diet, less than 7% of your calories should come from saturated fat and ideally 0% from trans fat. On an 1800-calorie diet, this translates into less than 14 grams of saturated fat per day, leaving 46 grams of fat to come from mono- and polyunsaturated fats.    Food Choices on a Heart Healthy Diet   Food Category   Foods Recommended   Foods to Avoid   Grains   Breads and rolls without salted tops Most dry and cooked cereals Unsalted crackers and breadsticks Low-sodium or homemade breadcrumbs or stuffing All rice and pastas   Breads, rolls, and crackers with salted tops High-fat baked goods (eg, muffins, donuts, pastries) Quick breads, self-rising flour, and biscuit mixes Regular bread crumbs Instant hot cereals Commercially prepared rice, pasta, or stuffing mixes   Vegetables   Most fresh, frozen, and low-sodium canned vegetables Low-sodium and salt-free vegetable juices Canned vegetables if unsalted or rinsed   Regular canned vegetables and juices, including sauerkraut and pickled vegetables Frozen vegetables with sauces Commercially prepared potato and vegetable mixes   Fruits   Most fresh, frozen, and canned fruits All fruit juices   Fruits processed with salt or sodium   Milk   Nonfat or low-fat (1%) milk Nonfat or low-fat yogurt Cottage cheese, low-fat ricotta, cheeses labeled as low-fat and low-sodium   Whole milk Reduced-fat (2%) milk Malted and chocolate milk Full fat yogurt Most cheeses (unless low-fat and low salt) Buttermilk (no more than 1 cup per week)   Meats and Beans   Lean cuts of fresh or frozen beef, veal, lamb, or pork (look for the word loin) Fresh or frozen poultry without the skin Fresh or frozen fish and some shellfish Egg whites and egg substitutes (Limit whole eggs to three per week) Tofu Nuts or seeds (unsalted, dry-roasted), low-sodium peanut butter Dried peas, beans, and lentils   Any smoked, cured, salted, or canned meat, fish, or poultry (including wilde, chipped beef, cold cuts, hot dogs, sausages, sardines, and anchovies) Poultry skins Breaded and/or fried fish or meats Canned peas, beans, and lentils Salted nuts   Fats and Oils   Olive oil and canola oil Low-sodium, low-fat salad dressings and mayonnaise   Butter, margarine, coconut and palm oils, wilde fat   Snacks, Sweets, and Condiments   Low-sodium or unsalted versions of broths, soups, soy sauce, and condiments Pepper, herbs, and spices; vinegar, lemon, or lime juice Low-fat frozen desserts (yogurt, sherbet, fruit bars) Sugar, cocoa powder, honey, syrup, jam, and preserves Low-fat, trans-fat free cookies, cakes, and pies Beny and animal crackers, fig bars, bonnie snaps   High-fat desserts Broth, soups, gravies, and sauces, made from instant mixes or other high-sodium ingredients Salted snack foods Canned olives Meat tenderizers, seasoning salt, and most flavored vinegars   Beverages   Low-sodium carbonated beverages Tea and coffee in moderation Soy milk   Commercially softened water   Suggestions   Make whole grains, fruits, and vegetables the base of your diet. Choose heart-healthy fats such as canola, olive, and flaxseed oil, and foods high in heart-healthy fats, such as nuts, seeds, soybeans, tofu, and fish. Eat fish at least twice per week; the fish highest in omega-3 fatty acids and lowest in mercury include salmon, herring, mackerel, sardines, and canned chunk light tuna. If you eat fish less than twice per week or have high triglycerides, talk to your doctor about taking fish oil supplements. Read food labels.    For products low in fat and cholesterol, look for fat free, low-fat, cholesterol free, saturated fat free, and trans fat freeAlso scan the Nutrition Facts Label, which lists saturated fat, trans fat, and cholesterol amounts. For products low in sodium, look for sodium free, very low sodium, low sodium, no added salt, and unsalted   Skip the salt when cooking or at the table; if food needs more flavor, get creative and try out different herbs and spices. Garlic and onion also add substantial flavor to foods. Trim any visible fat off meat and poultry before cooking, and drain the fat off after gonsales. Use cooking methods that require little or no added fat, such as grilling, boiling, baking, poaching, broiling, roasting, steaming, stir-frying, and sauting. Avoid fast food and convenience food. They tend to be high in saturated and trans fat and have a lot of added salt. Talk to a registered dietitian for individualized diet advice. Last Reviewed: March 2011 Power Cleary MS, MPH, RD   Updated: 3/29/2011   ·     Keep Your Memory Garon Penning       Many factors can affect your ability to remembera hectic lifestyle, aging, stress, chronic disease, and certain medicines. But, there are steps you can take to sharpen your mind and help preserve your memory. Challenge Your Brain   Regularly challenging your mind may help keeps it in top shape. Good mental exercises include:   Crossword puzzlesUse a dictionary if you need it; you will learn more that way. Brainteasers Try some! Crafts, such as wood working and sewing   Hobbies, such as gardening and building model airplanes   SocializingVisit old friends or join groups to meet new ones. Reading   Learning a new language   Taking a class, whether it be art history or tommy chi   TravelingExperience the food, history, and culture of your destination   Learning to use a computer   Going to museums, the theater, or thought-provoking movies   Changing things in your daily life, such as reversing your pattern in the grocery store or brushing your teeth using your nondominant hand   Use Memory Aids   There is no need to remember every detail on your own.  These memory aids can help:   Calendars and day planners   Electronic organizers to store all sorts of helpful informationThese devices can \"beep\" to remind you of appointments. A book of days to record birthdays, anniversaries, and other occasions that occur on the same date every year   Detailed \"to-do\" lists and strategically placed sticky notes   Quick \"study\" sessionsBefore a gathering, review who will be there so their names will be fresh in your mind. Establish routinesFor example, keep your keys, wallet, and umbrella in the same place all the time or take medicine with your 8:00 AM glass of juice   Live a Healthy Life   Many actions that will keep your body strong will do the same for your mind. For example:   Talk to Your Doctor About Herbs and Supplements    Malnutrition and vitamin deficiencies can impair your mental function. For example, vitamin B12 deficiency can cause a range of symptoms, including confusion. But, what if your nutritional needs are being met? Can herbs and supplements still offer a benefit? Researchers have investigated a range of natural remedies, such as ginkgo , ginseng , and the supplement phosphatidylserine (PS). So far, though, the evidence is inconsistent as to whether these products can improve memory or thinking. If you are interested in taking herbs and supplements, talk to your doctor first because they may interact with other medicines that you are taking. Exercise Regularly    Among the many benefits of regular exercise are increased blood flow to the brain and decreased risk of certain diseases that can interfere with memory function. One study found that even moderate exercise has a beneficial effect. Examples of \"moderate\" exercise include:   Playing 18 holes of golf once a week, without a cart   Playing tennis twice a week   Walking one mile per day   Manage Stress    It can be tough to remember what is important when your mind is cluttered.  Make time for relaxation. Choose activities that calm you down, and make it routine. Manage Chronic Conditions    Side effects of high blood pressure , diabetes, and heart disease can interfere with mental function. Many of the lifestyle steps discussed here can help manage these conditions. Strive to eat a healthy diet, exercise regularly, get stress under control, and follow your doctor's advice for your condition. Minimize Medications    Talk to your doctor about the medicines that you take. Some may be unnecessary. Also, healthy lifestyle habits may lower the need for certain drugs. Last Reviewed: April 2010 Masood Reina MD   Updated: 4/13/2010   ·   Smoking Cessation  This document explains the best ways for you to quit smoking and new treatments to help. It lists new medicines that can double or triple your chances of quitting and quitting for good. It also considers ways to avoid relapses and concerns you may have about quitting, including weight gain. NICOTINE: A POWERFUL ADDICTION  If you have tried to quit smoking, you know how hard it can be. It is hard because nicotine is a very addictive drug. For some people, it can be as addictive as heroin or cocaine. Usually, people make 2 or 3 tries, or more, before finally being able to quit. Each time you try to quit, you can learn about what helps and what hurts. Quitting takes hard work and a lot of effort, but you can quit smoking. QUITTING SMOKING IS ONE OF THE MOST IMPORTANT THINGS YOU WILL EVER DO. You will live longer, feel better, and live better. The impact on your body of quitting smoking is felt almost immediately:  Within 20 minutes, blood pressure decreases. Pulse returns to its normal level. After 8 hours, carbon monoxide levels in the blood return to normal. Oxygen level increases. After 24 hours, chance of heart attack starts to decrease. Breath, hair, and body stop smelling like smoke. After 48 hours, damaged nerve endings begin to recover. Sense of taste and smell improve. After 72 hours, the body is virtually free of nicotine. Bronchial tubes relax and breathing becomes easier. After 2 to 12 weeks, lungs can hold more air. Exercise becomes easier and circulation improves. Quitting will reduce your risk of having a heart attack, stroke, cancer, or lung disease:  After 1 year, the risk of coronary heart disease is cut in half. After 5 years, the risk of stroke falls to the same as a nonsmoker. After 10 years, the risk of lung cancer is cut in half and the risk of other cancers decreases significantly. After 15 years, the risk of coronary heart disease drops, usually to the level of a nonsmoker. If you are pregnant, quitting smoking will improve your chances of having a healthy baby. The people you live with, especially your children, will be healthier. You will have extra money to spend on things other than cigarettes. FIVE KEYS TO QUITTING  Studies have shown that these 5 steps will help you quit smoking and quit for good. You have the best chances of quitting if you use them together:  Get ready. Get support and encouragement. Learn new skills and behaviors. Get medicine to reduce your nicotine addiction and use it correctly. Be prepared for relapse or difficult situations. Be determined to continue trying to quit, even if you do not succeed at first.  1. GET READY  Set a quit date. Change your environment. Get rid of ALL cigarettes, ashtrays, matches, and lighters in your home, car, and place of work. Do not let people smoke in your home. Review your past attempts to quit. Think about what worked and what did not. Once you quit, do not smoke. NOT EVEN A PUFF! 2. GET SUPPORT AND ENCOURAGEMENT  Studies have shown that you have a better chance of being successful if you have help. You can get support in many ways. Tell your family, friends, and coworkers that you are going to quit and need their support.  Ask them not to smoke around you. Talk to your caregivers (doctor, dentist, nurse, pharmacist, psychologist, and/or smoking counselor). Get individual, group, or telephone counseling and support. The more counseling you have, the better your chances are of quitting. Programs are available at [facility]. Call your local health department for information about programs in your area. Spiritual beliefs and practices may help some smokers quit. Quit meters are small computer programs online or downloadable that keep track of quit statistics, such as amount of \"quit-time,\" cigarettes not smoked, and money saved. Many smokers find one or more of the many self-help books available useful in helping them quit and stay off tobacco.  3. LEARN NEW SKILLS AND BEHAVIORS  Try to distract yourself from urges to smoke. Talk to someone, go for a walk, or occupy your time with a task. When you first try to quit, change your routine. Take a different route to work. Drink tea instead of coffee. Eat breakfast in a different place. Do something to reduce your stress. Take a hot bath, exercise, or read a book. Plan something enjoyable to do every day. Reward yourself for not smoking. Explore interactive web-based programs that specialize in helping you quit. 4. GET MEDICINE AND USE IT CORRECTLY  Medicines can help you stop smoking and decrease the urge to smoke. Combining medicine with the above behavioral methods and support can quadruple your chances of successfully quitting smoking. The [place] Food and Drug Administration (FDA) has approved 7 medicines to help you quit smoking. These medicines fall into 3 categories. Nicotine replacement therapy (delivers nicotine to your body without the negative effects and risks of smoking):  Nicotine gum: Available over-the-counter. Nicotine lozenges: Available over-the-counter. Nicotine inhaler: Available by prescription. Nicotine nasal spray: Available by prescription.   Nicotine skin patches (transdermal): Available by prescription and over-the-counter. Antidepressant medicine (helps people abstain from smoking, but how this works is unknown): Bupropion sustained-release (SR) tablets: Available by prescription. Nicotinic receptor partial agonist (simulates the effect of nicotine in your brain):  Varenicline tartrate tablets: Available by prescription. Ask your caregiver for advice about which medicines to use and how to use them. Carefully read the information on the package. Everyone who is trying to quit may benefit from using a medicine. If you are pregnant or trying to become pregnant, nursing an infant, you are under age 25, or you smoke fewer than 10 cigarettes per day, talk to your caregiver before taking any nicotine replacement medicines. You should stop using a nicotine replacement product and call your caregiver if you experience nausea, dizziness, weakness, vomiting, fast or irregular heartbeat, mouth problems with the lozenge or gum, or redness or swelling of the skin around the patch that does not go away. Do not use any other product containing nicotine while using a nicotine replacement product. Talk to your caregiver before using these products if you have diabetes, heart disease, asthma, stomach ulcers, you had a recent heart attack, you have high blood pressure that is not controlled with medicine, a history of irregular heartbeat, or you have been prescribed medicine to help you quit smoking. 5. BE PREPARED FOR RELAPSE OR DIFFICULT SITUATIONS  Most relapses occur within the first 3 months after quitting. Do not be discouraged if you start smoking again. Remember, most people try several times before they finally quit. You may have symptoms of withdrawal because your body is used to nicotine. You may crave cigarettes, be irritable, feel very hungry, cough often, get headaches, or have difficulty concentrating. The withdrawal symptoms are only temporary.  They are strongest when you first quit, but they will go away within 10 to 14 days. Here are some difficult situations to watch for:  Alcohol. Avoid drinking alcohol. Drinking lowers your chances of successfully quitting. Caffeine. Try to reduce the amount of caffeine you consume. It also lowers your chances of successfully quitting. Other smokers. Being around smoking can make you want to smoke. Avoid smokers. Weight gain. Many smokers will gain weight when they quit, usually less than 10 pounds. Eat a healthy diet and stay active. Do not let weight gain distract you from your main goal, quitting smoking. Some medicines that help you quit smoking may also help delay weight gain. You can always lose the weight gained after you quit. Bad mood or depression. There are a lot of ways to improve your mood other than smoking. If you are having problems with any of these situations, talk to your caregiver. SPECIAL SITUATIONS AND CONDITIONS  Studies suggest that everyone can quit smoking. Your situation or condition can give you a special reason to quit. Pregnant women/new mothers: By quitting, you protect your baby's health and your own. Hospitalized patients: By quitting, you reduce health problems and help healing. Heart attack patients: By quitting, you reduce your risk of a second heart attack. Lung, head, and neck cancer patients: By quitting, you reduce your chance of a second cancer. Parents of children and adolescents: By quitting, you protect your children from illnesses caused by secondhand smoke. QUESTIONS TO THINK ABOUT  Think about the following questions before you try to stop smoking. You may want to talk about your answers with your caregiver. Why do you want to quit? If you tried to quit in the past, what helped and what did not? What will be the most difficult situations for you after you quit? How will you plan to handle them? Who can help you through the tough times? Your family? Friends? Caregiver? What pleasures do you get from smoking? What ways can you still get pleasure if you quit? Here are some questions to ask your caregiver: How can you help me to be successful at quitting? What medicine do you think would be best for me and how should I take it? What should I do if I need more help? What is smoking withdrawal like? How can I get information on withdrawal?  Quitting takes hard work and a lot of effort, but you can quit smoking. FOR MORE INFORMATION   Smokefree. gov ([employer]) provides free, accurate, evidence-based information and professional assistance to help support the immediate and long-term needs of people trying to quit smoking. Document Released: 12/12/2002 Document Revised: 12/06/2012 Document Reviewed: 10/04/2010  [facility] Patient Information ©2012 Connell Ormond. ·     Keeping Home a [facility]       As we get older, changes in balance, gait, strength, vision, hearing, and cognition make even the most youthful senior more prone to accidents. Falls are one of the leading health risks for older people. This increased risk of falling is related to:   Aging process (eg, decreased muscle strength, slowed reflexes)   Higher incidence of chronic health problems (eg, arthritis, diabetes) that may limit mobility, agility or sensory awareness   Side effects of medicine (eg, dizziness, blurred vision)especially medicines like prescription pain medicines and drugs used to treat mental health conditions   Depending on the brittleness of your bones, the consequences of a fall can be serious and long lasting. Home Life   Research by the [employer]) shows that some home accidents among older adults can be prevented by making simple lifestyle changes and basic modifications and repairs to the home environment. Here are some lifestyle changes that experts recommend:   Have your hearing and vision checked regularly.  Be sure to wear prescription glasses that are right for you. Speak to your doctor or pharmacist about the possible side effects of your medicines. A number of medicines can cause dizziness. If you have problems with sleep, talk to your doctor. Limit your intake of alcohol. If necessary, use a cane or walker to help maintain your balance. Wear supportive, rubber-soled shoes, even at home. If you live in a region that gets wintry weather, you may want to put special cleats on your shoes to prevent you from slipping on the snow and ice. Exercise regularly to help maintain muscle tone, agility, and balance. Always hold the banister when going up or down stairs. Also, use  bars when getting in or out of the bath or shower, or using the toilet. To avoid dizziness, get up slowly from a lying down position. Sit up first, dangling your legs for a minute or two before rising to a standing position. Overall Home Safety Check   According to the Consumer Product Safety Commision's \"Older Consumer Home Safety Checklist,\" it is important to check for potential hazards in each room. And remember, proper lighting is an essential factor in home safety. If you cannot see clearly, you are more likely to fall. Important questions to ask yourself include:   Are lamp, electric, extension, and telephone cords placed out of the flow of traffic and maintained in good condition? Have frayed cords been replaced? Are all small rugs and runners slip resistant? If not, you can secure them to the floor with a special double-sided carpet tape. Are smoke detectors properly locatedone on every floor of your home and one outside of every sleeping area? Are they in good working order? Are batteries replaced at least once a year? Do you have a well-maintained carbon monoxide detector outside every sleeping are in your home? Does your furniture layout leave plenty of space to maneuver between and around chairs, tables, beds, and sofas?    Are hallways, stairs and passages between rooms well lit? Can you reach a lamp without getting out of bed? Are floor surfaces well maintained? Shag rugs, high-pile carpeting, tile floors, and polished wood floors can be particularly slippery. Stairs should always have handrails and be carpeted or fitted with a non-skid tread. Is your telephone easily reachable. Is the cord safely tucked away? Room by Room   According to the Association of Aging, bathrooms and laxmi are the two most potentially hazardous rooms in your home. In the Kitchen    Be sure your stove is in proper working order and always make sure burners and the oven are off before you go out or go to sleep. Keep pots on the back burners, turn handles away from the front of the stove, and keep stove clean and free of grease build-up. Kitchen ventilation systems and range exhausts should be working properly. Keep flammable objects such as towels and pot holders away from the cooking area except when in use. Make sure kitchen curtains are tied back. Move cords and appliances away from the sink and hot surfaces. If extension cords are needed, install wiring guides so they do not hang over the sink, range, or working areas. Look for coffee pots, kettles and toaster ovens with automatic shut-offs. Keep a mop handy in the kitchen so you can wipe up spills instantly. You should also have a small fire extinguisher. Arrange your kitchen with frequently used items on lower shelves to avoid the need to stand on a stepstool to reach them. Make sure countertops are well-lit to avoid injuries while cutting and preparing food. In the Bathroom    Use a non-slip mat or decals in the tub and shower, since wet, soapy tile or porcelain surfaces are extremely slippery. Make sure bathroom rugs are non-skid or tape them firmly to the floor. Bathtubs should have at least one, preferably two, grab bars, firmly attached to structural supports in the wall.  (Do not use built-in soap holders or glass shower doors as grab bars.)    Tub seats fitted with non-slip material on the legs allow you to wash sitting down. For people with limited mobility, bathtub transfer benches allow you to slide safely into the tub. Raised toilet seats and toilet safety rails are helpful for those with knee or hip problems. In the [facility]    Make sure you use a nightlight and that the area around your bed is clear of potential obstacles. Be careful with electric blankets and never go to sleep with a heating pad, which can cause serious burns even if on a low setting. Use fire-resistant mattress covers and pillows, and NEVER smoke in bed. Keep a phone next to the bed that is programmed to dial 911 at the push of a button. If you have a chronic condition, you may want to sign on with an automatic call-in service. Typically the system includes a small pendant that connects directly to an emergency medical voice-response system. You should also make arrangements to stay in contact with someonefriend, neighbor, family memberon a regular schedule. Fire Prevention   According to the [employer]. (Smoke Alarms for Every) [address] [place] [address], senior citizens are one of the two highest risk groups for death and serious injuries due to residential fires. When cooking, wear short-sleeved items, never a bulky long-sleeved robe. The [facility]'s Safety Checklist for Older Consumers emphasizes the importance of checking basements, garages, workshops and storage areas for fire hazards, such as volatile liquids, piles of old rags or clothing and overloaded circuits. Never smoke in bed or when lying down on a couch or recliner chair. Small portable electric or kerosene heaters are responsible for many home fires and should be used cautiously if at all. If you do use one, be sure to keep them away from flammable materials.     In case of fire, make sure you have a pre-established emergency exit plan. Have a professional check your fireplace and other fuel-burning appliances yearly. Helping Hands   Baby boomers entering the hernandez years will continue to see the development of new products to help older adults live safely and independently in spite of age-related changes. Making Life More Livable  , by Mesha Renee, lists over 1,000 products for \"living well in the mature years,\" such as bathing and mobility aids, household security devices, ergonomically designed knives and peelers, and faucet valves and knobs for temperature control. Medical supply stores and organizations are good sources of information about products that improve your quality of life and insure your safety. Last Reviewed: November 2009 Caitlin Lancaster MD   Updated: 3/7/2011     ·   Personalized Preventive Plan for Essie Mcadams - 6/10/2021  Medicare offers a range of preventive health benefits. Some of the tests and screenings are paid in full while other may be subject to a deductible, co-insurance, and/or copay. Some of these benefits include a comprehensive review of your medical history including lifestyle, illnesses that may run in your family, and various assessments and screenings as appropriate. After reviewing your medical record and screening and assessments performed today your provider may have ordered immunizations, labs, imaging, and/or referrals for you. A list of these orders (if applicable) as well as your Preventive Care list are included within your After Visit Summary for your review. Other Preventive Recommendations:    A preventive eye exam performed by an eye specialist is recommended every 1-2 years to screen for glaucoma; cataracts, macular degeneration, and other eye disorders. A preventive dental visit is recommended every 6 months. Try to get at least 150 minutes of exercise per week or 10,000 steps per day on a pedometer .   Order or download the FREE \"Exercise &

## 2021-06-10 NOTE — PROGRESS NOTES
Medicare Annual Wellness Visit  Name: Norris Fairbanks Date: 6/10/2021   MRN: [id number] Sex: Male   Age: 73 y.o. Ethnicity: Non-/Non    : 1950 Race: Reilly Blankenship is here for Medicare AWV    Screenings for behavioral, psychosocial and functional/safety risks, and cognitive dysfunction are all negative except as indicated below. These results, as well as other patient data from the [address] form, are documented in Flowsheets linked to this Encounter. No Known Allergies      Prior to Visit Medications    Medication Sig Taking? Authorizing Provider   traZODone (DESYREL) 50 MG tablet 0.5 tab po at night prn insomnia Yes Iraida Rich MD   metoprolol succinate (TOPROL XL) 50 MG extended release tablet TAKE 1 TABLET BY MOUTH EVERY DAY Yes Iraida Rich MD   lisinopril (PRINIVIL;ZESTRIL) 20 MG tablet Take 1 tablet by mouth 2 times daily. Yes Iraida Rich MD   atorvastatin (LIPITOR) 80 MG tablet Take 1 tablet by mouth daily. Yes Iraida Rich MD   aspirin 81 MG EC tablet Take 81 mg by mouth daily. Yes Historical Provider, MD   sodium polystyrene (KAYEXALATE) 15 GM/60ML suspension Take 60 mLs by mouth once for 1 dose  JAE Geronimo   Handicap Placard MISC by Does not apply route Applying form 2021 -2022.   Iraida Rich MD   etodolac (LODINE) 400 MG tablet Take 1 tablet by mouth 2 times daily  Iraida Rich MD   nitroGLYCERIN (NITROSTAT) 0.4 MG SL tablet DISSOLVE 1 TABLET UNDER THE TONGUE AS NEEDED FOR CHEST PAIN- MAY REPEAT EVERY 5 MINUTES IF NEEDED ( MAX 3 DOSES.- IF NO RELIEF CALL 911)  Patient not taking: Reported on 2021  Historical Provider, MD         Past Medical History:   Diagnosis Date    Anxiety     CAD (coronary artery disease) 2012    had CABG    HIGH CHOLESTEROL     Hypercholesterolemia     Hyperlipidemia     Hypertension     Keratosis     Neuropathy     Psoriasis     Sleep apnea        Past Surgical History: Procedure Laterality Date    CARDIAC SURGERY      CARPAL TUNNEL RELEASE Left     COLONOSCOPY  9-10-12    dr Froy Colvin /2 polyps, diverticulosis, int hemmorrhoids    CORONARY ARTERY BYPASS GRAFT  1/9/12    CABG X 3    LAMINECTOMY Bilateral 5/5/2021    BILATERAL L 3-4 MICRODECOMPRESSION performed by Alfreda Banks MD at [address] [facility] [address]/CARPAL TUNNEL SURG Right 10/30/2018    RIGHT WRIST CARPAL TUNNEL RELEASE SUPINE performed by Hilaria Sahni MD at [facility] [address]         Family History   Problem Relation Age of Onset    Coronary Art Dis Mother     Heart Failure Mother     Heart Disease Mother     Heart Surgery Sister     Cancer Sister         breast    Heart Disease Sister     Heart Disease Father     Heart Disease Brother        CareTeam (Including outside providers/suppliers regularly involved in providing care):   Patient Care Team:  Calvin Lange MD as PCP - General (Family Medicine)  Calvin Lange MD as PCP - [facility] Empaneled Provider  Dez Simms MD (Cardiology)  Janine Kay MD as Surgeon (Cardiothoracic Surgery)    Wt Readings from Last 3 Encounters:   06/10/21 199 lb (90.3 kg)   06/08/21 197 lb (89.4 kg)   05/25/21 198 lb (89.8 kg)     Vitals:    06/10/21 1002   BP: 136/74   Site: Right Upper Arm   Position: Sitting   Cuff Size: Large Adult   Pulse: 84   Resp: 16   Temp: 98 °F (36.7 °C)   TempSrc: Oral   SpO2: 98%   Weight: 199 lb (90.3 kg)   Height: 5' 7\" (1.702 m)     Body mass index is 31.17 kg/m². Based upon direct observation of the patient, evaluation of cognition reveals recent and remote memory intact. Patient's complete Health Risk Assessment and screening values have been reviewed and are found in Flowsheets. The following problems were reviewed today and where indicated follow up appointments were made and/or referrals ordered.     Positive Risk Factor Screenings with Interventions:         Substance History:  Social History     Tobacco History     Smoking Status  Light Tobacco Smoker Last attempt to quit  1/6/2012 Smoking Frequency  0.5 packs/day for 3 years (1.5 pk yrs) Smoking Tobacco Type  Cigarettes    Smokeless Tobacco Use  Never Used          Alcohol History     Alcohol Use Status  Yes Comment  OCC. Drug Use     Drug Use Status  No          Sexual Activity     Sexually Active  Not Currently               Alcohol Screening:       A score of 8 or more is associated with harmful or hazardous drinking. A score of 13 or more in women, and 15 or more in men, is likely to indicate alcohol dependence. Substance Abuse Interventions:  · Tobacco abuse:  tobacco cessation tips and resources provided    General Health and ACP:  General  In general, how would you say your health is?: Excellent  In the past 7 days, have you experienced any of the following?  New or Increased Pain, New or Increased Fatigue, Loneliness, Social Isolation, Stress or Anger?: None of These  Do you get the social and emotional support that you need?: Yes  Do you have a Living Will?: Yes  Advance Directives     Power of  Living Will ACP-Advance Directive ACP-Power of     Not on File Not on File Not on File Not on File      General Health Risk Interventions:  · Stress: relaxation techniques discussed    Health Habits/Nutrition:  Health Habits/Nutrition  Do you exercise for at least 20 minutes 2-3 times per week?: Yes  Have you lost any weight without trying in the past 3 months?: No  Do you eat only one meal per day?: No  Have you seen the dentist within the past year?: Yes  Body mass index: (!) 31.16  Health Habits/Nutrition Interventions:  ·        Personalized Preventive Plan   Current Health Maintenance Status  Immunization History   Administered Date(s) Administered    COVID-19, Vega Peter, PF, 30mcg/0.3mL 01/26/2021, 02/17/2021    DT (pediatric) 06/15/2009    Influenza Virus Vaccine 01/25/2007, 01/29/2014    Influenza, High Dose (Fluzone 65 yrs and older) 10/13/2017, 10/06/2018, 10/05/2019    Influenza, Quadv, IM, PF (6 mo and older Fluzone, Flulaval, Fluarix, and 3 yrs and older Afluria) 11/16/2016    Pneumococcal Conjugate 13-valent ([id number]) 07/31/2018    Pneumococcal Polysaccharide ([id number]) 03/02/2020    Tdap (Boostrix, Adacel) 07/31/2018        Health Maintenance   Topic Date Due    Hepatitis C screen  Never done    Shingles Vaccine (1 of 2) Never done   [employer] Visit (AWV)  Never done    Lipid screen  07/17/2021    Creatinine monitoring  04/30/2022    Potassium monitoring  05/05/2022    Colon cancer screen colonoscopy  09/10/2022    Diabetes screen  04/05/2024    DTaP/Tdap/Td vaccine (3 - Td or Tdap) 07/31/2028    Flu vaccine  Completed    Pneumococcal 65+ years Vaccine  Completed    COVID-19 Vaccine  Completed    AAA screen  Completed    Hepatitis A vaccine  Aged Out    Hepatitis B vaccine  Aged Out    Hib vaccine  Aged Out    Meningococcal (ACWY) vaccine  Aged Out     Recommendations for [employer] Due: see orders and patient instructions/AVS.  . Recommended screening schedule for the next 5-10 years is provided to the patient in written form: see Patient Instructions/AVS.    Veto GARCIA, LPN, 3/88/8940, performed the documented evaluation under the direct supervision of the attending physician. This encounter was performed under Calvin yañez MDs, direct supervision, 6/10/2021.

## 2021-06-14 ENCOUNTER — HOSPITAL ENCOUNTER (OUTPATIENT)
Dept: PHYSICAL THERAPY | Age: 71
Setting detail: THERAPIES SERIES
Discharge: HOME | End: 2021-06-14
Payer: MEDICARE

## 2021-06-14 PROCEDURE — 97110 THERAPEUTIC EXERCISES: CPT

## 2021-06-14 PROCEDURE — 97162 PT EVAL MOD COMPLEX 30 MIN: CPT

## 2021-06-14 NOTE — PROGRESS NOTES
pain or limitations. Skilled PT required to address about deficits to improve over function and return to prior level of function. Prognosis: Good  Discharge Recommendations: Continue to assess pending progress        Decision Making: Medium Complexity  History: HTN, laminectomy lumbar, CABG, CAD, carpal tunnel surgery B  Exam: LEFS 40/80=functional  Clinical Presentation: evolving        Plan  Frequency/Duration:  Plan  Times per week: 2  Plan weeks: 2-4  Current Treatment Recommendations: Strengthening, ROM, Home Exercise Program, Modalities, Stair training, Gait Training, Balance Training, Manual Therapy - Soft Tissue Mobilization, Neuromuscular Re-education         Patient Education  New Education Provided: PT Education: Goals;PT Role;Plan of Care;Home Exercise Program  Patient Education: Written HEP provided and advised  to use cold therapy for swelling and pain control    POST-PAIN     Pain Rating (0-10 pain scale):   0/10  Location and pain description same as pre-treatment unless indicated. Action: [x] NA  [] Call Physician  [] Perform HEP  [] Meds as prescribed    Evaluation and patient rights have been reviewed and patient agrees with plan of care. Yes  [x]  No  []   Explain:       Ema Fall Risk Assessment  Risk Factor Scale  Score   History of Falls [] Yes  [x] No 25  0 0   Secondary Diagnosis [] Yes  [x] No 15  0 0   Ambulatory Aid [] Furniture  [] Crutches/cane/walker  [x] None/bedrest/wheelchair/nurse 30  15  0 0   IV/Heparin Lock [] Yes  [x] No 20  0 0   Gait/Transferring [] Impaired  [x] Weak  [] Normal/bedrest/immobile 20  10  0 10   Mental Status [] Forgets limitations  [x] Oriented to own ability 15  0 0      Total:10     Based on the Assessment score: check the appropriate box.   [x]  No intervention needed   Low =   Score of 0-24  []  Use standard prevention interventions Moderate =  Score of 24-44   [] Discuss fall prevention strategies   [] Indicate moderate falls risk on eval  []  Use

## 2021-06-14 NOTE — PROGRESS NOTES
[address]  [address]  INZVB:[phone]    [x] Certification  [] Recertification [x]  Plan of Care  [] Progress Note [] Discharge      To:  Dr Leticia Mcknight      From:  Aliza Mijares, PT  Patient: Tonie Boyer     : 1950  Diagnosis: Lumbar stenosis with neurogenic claudication s/p lumbar laminectomy     Date: 2021  Treatment Diagnosis: LBP with functional limitations due to stregth and ROM deficits lumbar and L>R LE    Plan of Care/Certification Expiration Date: 21  Progress Report Period from:  2021  to 2021    Total # of Visits to Date: 1   No Show: 0    Canceled Appointment: 0     OBJECTIVE:   Short Term Goals - Time Frame for Short term goals: =LTG      Long Term Goals - Time Frame for Long term goals : 2-4 weeks  Goals Current/ Discharge status Met   Long term goal 1: Indep HEP for symptom management Written HEP provided  for symptom management   [] yes  [x] no   Long term goal 2: Pt demo improved overall function by reporting greater than 80% per functional survey score Exam: LEFS 40/80=functional   [] yes  [x] no   Long term goal 3: Improve L LE strength 4/5 to allow patient to return to ambulating >15 min Strength RLE  Comment: 4/5 hip, knee/ankle  Strength LLE  Comment: 4- to 4/5 hip/knee /ankle    [] yes  [x] no   Long term goal 4: Ambulate with no AD  safe/Indep community distances and uneven surfaces with  no deviations. Ambulation 1  Surface: carpet  Device: No Device  Assistance: Independent  Distance: unlimited distance in clinic, fatigues quickly    [] yes  [x] no   Long term goal 5: Hamstring flexibility -30° @ 90/90 to assist with improved posture awareness.  Flexibility: Hamsting flexibility -40°R, -42°L at 90/90 hip/knee position   [] yes  [x] no      Body structures, Functions, Activity limitations: Decreased functional mobility , Decreased ROM, Decreased strength, Decreased posture, Increased pain  Assessment: The pt's impairments currently limit functional abilities by 50% including his abilities to walk, bed, lift, perform recreational actvities, and perform household/work related duties without pain or limitations. Skilled PT required to address about deficits to improve over function and return to prior level of function. Prognosis: Good  Discharge Recommendations: Continue to assess pending progress      PT Education: Goals;PT Role;Plan of Care;Home Exercise Program  Patient Education: Written HEP provided and advised  to use cold therapy for swelling and pain control    PLAN: [x] Evaluate and Treat  Frequency/Duration:  Plan  Times per week: 2  Plan weeks: 2-4  Current Treatment Recommendations: Strengthening, ROM, Home Exercise Program, Modalities, Stair training, Gait Training, Balance Training, Manual Therapy - Soft Tissue Mobilization, Neuromuscular Re-education     Precautions:         Spinal Precautions: 5# lifting restriction                  Patient Status:[x] Continue/ Initiate plan of Care    [] Discharge PT. Recommend pt continue with HEP. [] Additional visits requested, Please re-certify for additional visits:          Signature: Electronically signed by Saray Munoz PT on 6/14/21 at 10:56 AM EDT      If you have any questions or concerns, please don't hesitate to call. Thank you for your referral.    I have reviewed this plan of care and certify a need for medically necessary rehabilitation services.     Physician Signature:__________________________________________________________  Date:  Please sign and return

## 2021-06-16 ENCOUNTER — HOSPITAL ENCOUNTER (OUTPATIENT)
Dept: PHYSICAL THERAPY | Age: 71
Setting detail: THERAPIES SERIES
Discharge: HOME | End: 2021-06-16
Payer: MEDICARE

## 2021-06-16 PROCEDURE — 97110 THERAPEUTIC EXERCISES: CPT

## 2021-06-16 NOTE — PROGRESS NOTES
[address]  [address]  DTYWX:[phone]        Date: 2021  Patient: Rebekah Patino  : 1950  ACCT #: [de-identified]  Referring Practitioner: Dr Maylin Shankar  Diagnosis: Lumbar stenosis with neurogenic claudication s/p lumbar laminectomy    Visit Information:  PT Visit Information  Onset Date: 21  PT Insurance Information: Medicare  Total # of Visits Approved:  (BMN)  Total # of Visits to Date: 2  Plan of Care/Certification Expiration Date: 21  No Show: 0  Progress Note Due Date: 21  Canceled Appointment: 0  Progress Note Counter:  (PN 21)    Subjective: Pt states he is doing good since surgery. No c/o pain just some fatigue at the end of the day. He does have some arthritis so he does experience pain in the morning but eases up after after hot shower and he gets going. Comments: RTD none with Dr Litzy Christian  HEP Compliance:  [] Good [x] Fair [] Poor [] Reports not doing due to:    Vital Signs  Patient Currently in Pain: Denies (residual pain and in the evening 0/10 however activity slows down.)   Pain Screening  Patient Currently in Pain: Denies (residual pain and in the evening 0/10 however activity slows down.)    OBJECTIVE:   Exercises  Exercise 1: bike (2-3) x 6 mins  Exercise 2: SKTC 10 sec x 10 B  Exercise 3: LTR 5 s x 10 B  Exercise 4: ham stretch 30 s x 3  Exercise 5: piriformis stretch x 5, 10 seconds. Difficult on L  Exercise 6: Abdominal draw in x 10, 5 seconds  Exercise 7: Abd draw in with bridging x 10, 5-10 seconds  Exercise 10: Shoulder extension with abd draw in x 10, 5 seconds , Blue T band  Exercise 11: midrow*  Exercise 12: step up F/L*  Exercise 20: HEP: SKTC, LTR, Ham stretch, TA iso, bridging, SLR          Strength: [x] NT  [] MMT completed:        ROM: [x] NT  [] ROM measurements:        *Indicates exercise, modality, or manual techniques to be initiated when appropriate    Assessment:         Body structures, Functions, Activity limitations: Decreased functional mobility , Decreased ROM, Decreased strength, Decreased posture, Increased pain  Assessment: Instructed pt in post op lumbar stability ther ex this session. He is performing LTR, KTC, and hamstring stretches for improving trunk mobility. He attempted piriformis stretch but did have some limitations with L LE mobility. Initiated TA isometric to increased stability. He tolerated TA iso with bridging and with SLR. Pt responded well to initial PT session. Updated HEP with good understanding. Treatment Diagnosis: LBP with functional limitations due to stregth and ROM deficits lumbar and L>R LE  Prognosis: Good  PT Education: Home Exercise Program  Patient Education: Issued and reviewed post op lumbar HEP    Goals:  Short term goals  Time Frame for Short term goals: =LTG    Long term goals  Time Frame for Long term goals : 2-4 weeks  Long term goal 1: Indep HEP for symptom management  Long term goal 2: Pt demo improved overall function by reporting greater than 80% per functional survey score  Long term goal 3: Improve L LE strength 4/5 to allow patient to return to ambulating >15 min  Long term goal 4: Ambulate with no AD  safe/Indep community distances and uneven surfaces with  no deviations. Long term goal 5: Hamstring flexibility -30° @ 90/90 to assist with improved posture awareness. Progress toward goals: Initiated post op lumbar PT POC towards achieving goals. POST-PAIN       Pain Rating (0-10 pain scale):   0/10   Location and pain description same as pre-treatment unless indicated.    Action: [x] NA   [x] Perform HEP  [] Meds as prescribed  [] Modalities as prescribed   [] Call Physician     Frequency/Duration:  Plan  Times per week: 2  Plan weeks: 2-4  Current Treatment Recommendations: Strengthening, ROM, Home Exercise Program, Modalities, Stair training, Gait Training, Balance Training, Manual Therapy - Soft Tissue Mobilization, Neuromuscular Re-education     Pt to continue current HEP. See objective section for any therapeutic exercise changes, additions or modifications this date.        PT Individual Minutes  Time In: 4217  Time Out: 9745  Minutes: 46  Timed Code Treatment Minutes: 46 Minutes     Timed Activity Minutes Units   Ther Ex 46 3            Signature:  Electronically signed by Ilir Anderson PTA on 6/16/21 at 9:28 AM EDT

## 2021-06-21 ENCOUNTER — HOSPITAL ENCOUNTER (OUTPATIENT)
Dept: PHYSICAL THERAPY | Age: 71
Setting detail: THERAPIES SERIES
Discharge: HOME | End: 2021-06-21
Payer: MEDICARE

## 2021-06-21 PROCEDURE — 97110 THERAPEUTIC EXERCISES: CPT

## 2021-06-21 NOTE — PROGRESS NOTES
[address]  [address]  FQRWI:[phone]        Date: 2021  Patient: Essie Mcadams  : 1950  ACCT #: [de-identified]  Referring Practitioner: Dr Jinny Lopez  Diagnosis: Lumbar stenosis with neurogenic claudication s/p lumbar laminectomy  Treatment Diagnosis: LBP with functional limitations due to stregth and ROM deficits lumbar and L>R LE    Visit Information:  PT Visit Information  Onset Date: 21  PT Insurance Information: Medicare  Total # of Visits Approved:  (BMN)  Total # of Visits to Date: 3  Plan of Care/Certification Expiration Date: 21  No Show: 0  Progress Note Due Date: 21  Canceled Appointment: 0  Progress Note Counter: 3/8 (PN 21)    Subjective: Pt states he feels good, felt sore after after first session but no problem. By the end of the day his legs feel like lead, but other than that no complaints. Comments: RTD none with Dr Chato Cancino  HEP Compliance:  [x] Good [] Fair [] Poor [] Reports not doing due to:    Vital Signs  Patient Currently in Pain: Denies (residual pain and in the evening 0/10 however activity slows down.)   Pain Screening  Patient Currently in Pain: Denies (residual pain and in the evening 0/10 however activity slows down.)    OBJECTIVE:   Exercises  Exercise 1: bike (2-3) x 6 mins  Exercise 2: SKTC 10 sec x 10 B  Exercise 3: LTR 5 s x 10 B  Exercise 4: ham stretch 30 s x 3  Exercise 5: piriformis stretch x 5, 10 seconds.  Difficult on L  Exercise 6: Abdominal draw in x 10, 5 seconds  Exercise 7: Abd draw in with bridging x 10, 5-10 seconds  Exercise 8: Abd draw in with SLR 2 x 5, 5 seconds  Exercise 10: Shoulder extension with abd draw in  2 x 10, 5 seconds , Blue T band  Exercise 11: midrow x 20, 5 seconds, Blue T band  Exercise 12: step up F/L*       Strength: [x] NT  [] MMT completed:     ROM: [x] NT  [] ROM measurements:        *Indicates exercise, modality, or manual techniques to be initiated when appropriate    Assessment: Body structures, Functions, Activity limitations: Decreased functional mobility , Decreased ROM, Decreased strength, Decreased posture, Increased pain  Assessment: Reviewed previous post op lumbar ther ex this session. He is tolerating gentle trunk stretches such as KTC, hamstring, LTR for improving low back ROM. He is progressing through abdominal stability exercises performing increased reps with SLR, bridging, and T band shoulder extension. He is progressing towards improving function, mobility, and ambulation. Treatment Diagnosis: LBP with functional limitations due to stregth and ROM deficits lumbar and L>R LE  Prognosis: Good       Goals:  Short term goals  Time Frame for Short term goals: =LTG    Long term goals  Time Frame for Long term goals : 2-4 weeks  Long term goal 1: Indep HEP for symptom management  Long term goal 2: Pt demo improved overall function by reporting greater than 80% per functional survey score  Long term goal 3: Improve L LE strength 4/5 to allow patient to return to ambulating >15 min  Long term goal 4: Ambulate with no AD  safe/Indep community distances and uneven surfaces with  no deviations. Long term goal 5: Hamstring flexibility -30° @ 90/90 to assist with improved posture awareness. Progress toward goals: Pt performing lumbar-abdomina stability program for meeting listed goals per POC. POST-PAIN       Pain Rating (0-10 pain scale):   0/10   Location and pain description same as pre-treatment unless indicated. Action: [x] NA   [] Perform HEP  [] Meds as prescribed  [] Modalities as prescribed   [] Call Physician     Frequency/Duration:  Plan  Times per week: 2  Plan weeks: 2-4  Current Treatment Recommendations: Strengthening, ROM, Home Exercise Program, Modalities, Stair training, Gait Training, Balance Training, Manual Therapy - Soft Tissue Mobilization, Neuromuscular Re-education     Pt to continue current HEP.   See objective section for any therapeutic exercise changes, additions or modifications this date.        PT Individual Minutes  Time In: 2944  Time Out: 6337  Minutes: 52  Timed Code Treatment Minutes: 47 Minutes     Timed Activity Minutes Units   Ther Ex 47 3     Signature:  Electronically signed by Ilir Anderson PTA on 6/21/21 at 9:33 AM EDT

## 2021-06-23 ENCOUNTER — HOSPITAL ENCOUNTER (OUTPATIENT)
Dept: PHYSICAL THERAPY | Age: 71
Setting detail: THERAPIES SERIES
Discharge: HOME | End: 2021-06-23
Payer: MEDICARE

## 2021-06-23 PROCEDURE — 97110 THERAPEUTIC EXERCISES: CPT

## 2021-06-23 NOTE — PROGRESS NOTES
[address]  [address]  LXQGA:[phone]        Date: 2021  Patient: Taz Syed  : 1950  ACCT #: [de-identified]  Referring Practitioner: Dr Cheryl Peters  Diagnosis: Lumbar stenosis with neurogenic claudication s/p lumbar laminectomy  Treatment Diagnosis: LBP with functional limitations due to stregth and ROM deficits lumbar and L>R LE    Visit Information:  PT Visit Information  Onset Date: 21  PT Insurance Information: Medicare  Total # of Visits Approved:  (BMN)  Total # of Visits to Date: 4  Plan of Care/Certification Expiration Date: 21  No Show: 0  Progress Note Due Date: 21  Canceled Appointment: 0  Progress Note Counter:  (PN 21)    Subjective: Pt states he is doing good since surgery. Just some soreness in hips from exercises. LB stiff in the morning but probably d/t arthritis. Legs are tired at the end of the day, feel like lead, but other than that no complaints.   Comments: RTD none with Dr Toby Mazariegos  HEP Compliance:  [] Good [x] Fair [] Poor [] Reports not doing due to:    Vital Signs  Patient Currently in Pain: Denies (residual pain and in the evening 0/10 however activity slows down.)   Pain Screening  Patient Currently in Pain: Denies (residual pain and in the evening 0/10 however activity slows down.)    OBJECTIVE:   Exercises  Exercise 1: bike (2-3) x 6 mins, L1  Exercise 2: SKTC 10 sec x 10 B  Exercise 3: LTR 5 s x 10 B  Exercise 4: ham stretch 30 s x 3  Exercise 6: Abdominal draw in x 10, 5 seconds  Exercise 7: Abd draw in with bridging 2 x 10, 5-10 seconds  Exercise 8: Abd draw in with SLR 2 x 5, 5 seconds  Exercise 10: Shoulder extension with abd draw in  2 x 10, 5 seconds , Blue T band  Exercise 11: midrow x 20, 5 seconds, Blue T band  Exercise 12: step up Forward x 10 each  Exercise 13: step up Lateral       Strength: [x] NT  [] MMT completed:     ROM: [] NT  [x] ROM measurements:     AROM RLE (degrees)  RLE General AROM: -25 degress R hamstring(supine)     AROM LLE (degrees)  LLE General AROM: -30 degrees L hamstring(with compensation in supine)          *Indicates exercise, modality, or manual techniques to be initiated when appropriate    Assessment: Body structures, Functions, Activity limitations: Decreased functional mobility , Decreased ROM, Decreased strength, Decreased posture, Increased pain  Assessment: Pt demonstrates improvement initiating TA muscle for increased trunk stability. He is performing abd draw in with T band shoulder ext and row. As far as functional strength we initiated unilateral step ups to increase quadricpes, glute max, and hamstring strength. He is a good candidate for meeting all long term goals per POC. Treatment Diagnosis: LBP with functional limitations due to stregth and ROM deficits lumbar and L>R LE  Prognosis: Good       Goals:  Short term goals  Time Frame for Short term goals: =LTG    Long term goals  Time Frame for Long term goals : 2-4 weeks  Long term goal 1: Indep HEP for symptom management  Long term goal 2: Pt demo improved overall function by reporting greater than 80% per functional survey score  Long term goal 3: Improve L LE strength 4/5 to allow patient to return to ambulating >15 min  Long term goal 4: Ambulate with no AD  safe/Indep community distances and uneven surfaces with  no deviations. Long term goal 5: Hamstring flexibility -30° @ 90/90 to assist with improved posture awareness. Progress toward goals: Pt performing functional strengthening exercises for meeting long term 2,3,4. POST-PAIN       Pain Rating (0-10 pain scale):   0/10   Location and pain description same as pre-treatment unless indicated.    Action: [x] NA   [] Perform HEP  [] Meds as prescribed  [] Modalities as prescribed   [] Call Physician     Frequency/Duration:  Plan  Times per week: 2  Plan weeks: 2-4  Current Treatment Recommendations: Strengthening, ROM, Home

## 2021-06-28 ENCOUNTER — HOSPITAL ENCOUNTER (OUTPATIENT)
Dept: PHYSICAL THERAPY | Age: 71
Setting detail: THERAPIES SERIES
Discharge: HOME | End: 2021-06-28
Payer: MEDICARE

## 2021-06-28 PROCEDURE — 97110 THERAPEUTIC EXERCISES: CPT

## 2021-06-28 ASSESSMENT — PAIN DESCRIPTION - ORIENTATION: ORIENTATION: LOWER

## 2021-06-28 ASSESSMENT — PAIN SCALES - GENERAL: PAINLEVEL_OUTOF10: 1

## 2021-06-28 ASSESSMENT — PAIN DESCRIPTION - LOCATION: LOCATION: BACK

## 2021-06-28 NOTE — PROGRESS NOTES
[address]  [address]  YBLWS:[phone]        Date: 2021  Patient: Taz Syed  : 1950  ACCT #: [de-identified]  Referring Practitioner: Dr Cheryl Peters  Diagnosis: Lumbar stenosis with neurogenic claudication s/p lumbar laminectomy  Treatment Diagnosis: LBP with functional limitations due to stregth and ROM deficits lumbar and L>R LE    Visit Information:  PT Visit Information  Onset Date: 21  PT Insurance Information: Medicare  Total # of Visits Approved:  (BMN)  Total # of Visits to Date: 5  Plan of Care/Certification Expiration Date: 21  No Show: 0  Canceled Appointment: 0  Progress Note Counter:  (PN 21)    Subjective: Pt was pretty active over the weekend but back has been feeling pretty good. C/o of stiffness in lower back but says its d/t arthritis. Comments: RTD none with Dr Toby Mazariegos  HEP Compliance:  [x] Good [] Fair [] Poor [] Reports not doing due to:    Vital Signs  Patient Currently in Pain: Yes (residual pain in the morning. \"But after Im up for about a half hour Im fine)   Pain Screening  Patient Currently in Pain: Yes (residual pain in the morning.  \"But after Im up for about a half hour Im fine)  Pain Assessment  Pain Assessment: 0-10  Pain Level: 1  Pain Location: Back  Pain Orientation: Lower    OBJECTIVE:   Exercises  Exercise 1: bike (2-3) x 6 mins, L1  Exercise 2: SKTC 10 sec x 10 B  Exercise 3: LTR 5 s x 10 B  Exercise 4: ham stretch 30 s x 3  Exercise 6: Abdominal draw in x 10, 5 seconds  Exercise 7: Abd draw in with bridging 2 x 10, 5-10 seconds  Exercise 8: Abd draw in with SLR 2 x 5, 5 seconds  Exercise 10: Shoulder extension with abd draw in  2 x 10, 5 seconds , Blue T band  Exercise 11: midrow x 20, 5 seconds, Blue T band  Exercise 12: step up Forward x 10 each  Exercise 13: step up Lateral x 10 each      Strength: [x] NT  [] MMT completed:    ROM: [x] NT  [] ROM measurements:      Assessment: Body structures, Functions, Activity limitations: Decreased functional mobility , Decreased ROM, Decreased strength, Decreased posture, Increased pain  Assessment: Pt continued to work through exercises to improve LE strength and trunk stability. Pt noted B leg fatigue when performing SLR with abdominal brace but all other exercises were performed with good tolerance. Pt continued to improve LE strength by performing forward step ups and lateral step ups without UE support. Pt is improving well towards cpmpleting long term goals. Treatment Diagnosis: LBP with functional limitations due to stregth and ROM deficits lumbar and L>R LE  Prognosis: Good       Goals:  Short term goals  Time Frame for Short term goals: =LTG    Long term goals  Time Frame for Long term goals : 2-4 weeks  Long term goal 1: Indep HEP for symptom management  Long term goal 2: Pt demo improved overall function by reporting greater than 80% per functional survey score  Long term goal 3: Improve L LE strength 4/5 to allow patient to return to ambulating >15 min  Long term goal 4: Ambulate with no AD  safe/Indep community distances and uneven surfaces with  no deviations. Long term goal 5: Hamstring flexibility -30° @ 90/90 to assist with improved posture awareness. Progress toward goals: Pt is demonstrating good progress towards goals    POST-PAIN       Pain Rating (0-10 pain scale):  1 /10   Location and pain description same as pre-treatment unless indicated. Action: [x] NA   [] Perform HEP  [] Meds as prescribed  [] Modalities as prescribed   [] Call Physician     Frequency/Duration:  Plan  Times per week: 2  Plan weeks: 2-4  Current Treatment Recommendations: Strengthening, ROM, Home Exercise Program, Modalities, Stair training, Gait Training, Balance Training, Manual Therapy - Soft Tissue Mobilization, Neuromuscular Re-education     Pt to continue current HEP.   See objective section for any therapeutic exercise changes, additions or modifications this date.          PT Individual Minutes  Time In: 0845  Time Out: 8838  Minutes: 53  Timed Code Treatment Minutes: 53 Minutes       Timed Activity Minutes Units   Ther Ex 53 4       Signature:  Electronically signed by Alejandra Armando PTA on 6/28/21 at 9:45 AM EDT

## 2021-06-30 ENCOUNTER — HOSPITAL ENCOUNTER (OUTPATIENT)
Dept: PHYSICAL THERAPY | Age: 71
Setting detail: THERAPIES SERIES
Discharge: HOME | End: 2021-06-30
Payer: MEDICARE

## 2021-06-30 PROCEDURE — 97110 THERAPEUTIC EXERCISES: CPT

## 2021-06-30 ASSESSMENT — PAIN DESCRIPTION - DESCRIPTORS: DESCRIPTORS: ACHING;TIGHTNESS

## 2021-06-30 ASSESSMENT — PAIN SCALES - GENERAL: PAINLEVEL_OUTOF10: 1

## 2021-06-30 ASSESSMENT — PAIN DESCRIPTION - LOCATION: LOCATION: BACK

## 2021-06-30 ASSESSMENT — PAIN DESCRIPTION - ORIENTATION: ORIENTATION: LOWER

## 2021-06-30 NOTE — PROGRESS NOTES
[address]  [address]  JLRXK:[phone]        Date: 2021  Patient: Francoise Lynn  : 1950  ACCT #: [de-identified]  Referring Practitioner: Dr Shamar Wise  Diagnosis: Lumbar stenosis with neurogenic claudication s/p lumbar laminectomy  Treatment Diagnosis: LBP with functional limitations due to stregth and ROM deficits lumbar and L>R LE    Visit Information:  PT Visit Information  Onset Date: 21  PT Insurance Information: Medicare  Total # of Visits Approved:  (BMN)  Total # of Visits to Date: 6  Plan of Care/Certification Expiration Date: 21  No Show: 0  Canceled Appointment: 0  Progress Note Counter:  (PN 21)    Subjective: Pt always wakes up with a little pain and stiffness in his back due to arthritis. The doctor said he will probably always deal with that. Pt is very pleased with the surgery and has been feeling really good. His left hip/leg is still a little weak. He stays active at home going up/down stairs throughout the day and keeps up with his 2 acres. Comments: RTD none with Dr Kristin Leonardo  HEP Compliance:  [x] Good [] Fair [] Poor [] Reports not doing due to:    Vital Signs  Patient Currently in Pain: Yes (residual pain in the morning. \"But after Im up for about a half hour Im fine)   Pain Screening  Patient Currently in Pain: Yes (residual pain in the morning.  \"But after Im up for about a half hour Im fine)  Pain Assessment  Pain Assessment: 0-10  Pain Level: 1  Pain Location: Back  Pain Orientation: Lower  Pain Descriptors: Aching;Tightness    OBJECTIVE:   Exercises  Exercise 1: bike (4) x 6 mins, L6  Exercise 2: LTR 10 x 5\"  Exercise 3: SKTC 10 x 10\"  Exercise 4: ham stretch 3 x 30\"  Exercise 5: Abd draw in with SLR 2 x 5, 5 sec  Exercise 6: Abd draw in with bridging 2 x 10, 5-10 seconds  Exercise 8: Back Ext mach (4-5) 30# x 10  Exercise 9: Shoulder extension with abd draw in  x 20, 5 sec, Blue T band  Exercise 10: midrow x 20, 5 sec, Blue T band    *Indicates exercise, modality, or manual techniques to be initiated when appropriate    Assessment: Body structures, Functions, Activity limitations: Decreased functional mobility , Decreased ROM, Decreased strength, Decreased posture, Increased pain  Assessment: Pt completed lumbar/LE stab ROM and stab ex's to improve function. L LE weak compared to R LE during SLR. Also challenged with bridges[employer] back extension machine this visit which he tolerated well with proper demo. Continued tband ex's to improve core and posture. Pt is progressing nicely in therapy. Treatment Diagnosis: LBP with functional limitations due to stregth and ROM deficits lumbar and L>R LE  Prognosis: Good    Goals:  Short term goals  Time Frame for Short term goals: =LTG    Long term goals  Time Frame for Long term goals : 2-4 weeks  Long term goal 1: Indep HEP for symptom management  Long term goal 2: Pt demo improved overall function by reporting greater than 80% per functional survey score  Long term goal 3: Improve L LE strength 4/5 to allow patient to return to ambulating >15 min  Long term goal 4: Ambulate with no AD  safe/Indep community distances and uneven surfaces with  no deviations. Long term goal 5: Hamstring flexibility -30° @ 90/90 to assist with improved posture awareness. Progress toward goals: Pt reporting improvement in LTG 2    POST-PAIN       Pain Rating (0-10 pain scale):   1/10   Location and pain description same as pre-treatment unless indicated. Action: [x] NA   [] Perform HEP  [] Meds as prescribed  [] Modalities as prescribed    [] Call Physician     Frequency/Duration:  Plan  Times per week: 2  Plan weeks: 2-4  Current Treatment Recommendations: Strengthening, ROM, Home Exercise Program, Modalities, Stair training, Gait Training, Balance Training, Manual Therapy - Soft Tissue Mobilization, Neuromuscular Re-education    Pt to continue current HEP.   See objective section for any therapeutic exercise changes, additions or modifications this date.       PT Individual Minutes  Time In: 6713  Time Out: 5588  Minutes: 55  Timed Code Treatment Minutes: 54 Minutes     Timed Activity Minutes Units   Ther Ex 54 4       Signature:  Electronically signed by Terri Bhatia PTA on 6/30/21 at 8:18 AM EDT

## 2021-07-07 ENCOUNTER — HOSPITAL ENCOUNTER (OUTPATIENT)
Dept: PHYSICAL THERAPY | Age: 71
Setting detail: THERAPIES SERIES
Discharge: HOME | End: 2021-07-07
Payer: MEDICARE

## 2021-07-07 PROCEDURE — 97110 THERAPEUTIC EXERCISES: CPT

## 2021-07-07 NOTE — PROGRESS NOTES
[address]  [address]  NEJWD:[phone]         Date: 2021  Patient: Francoise Lynn  : 1950  ACCT #: [de-identified]  Referring Practitioner: Dr Shamar Wise  Diagnosis: Lumbar stenosis with neurogenic claudication s/p lumbar laminectomy  Treatment Diagnosis: LBP with functional limitations due to stregth and ROM deficits lumbar and L>R LE    Visit Information:  PT Visit Information  Onset Date: 21  PT Insurance Information: Medicare  Total # of Visits Approved:  (BMN)  Total # of Visits to Date: 7  Plan of Care/Certification Expiration Date: 21  No Show: 0  Canceled Appointment: 0  Progress Note Counter:  (PN 21)    Subjective: Pt just wakes up with a lot of stiffness in his low back/hips due to arthritis. It's hard to put socks on in the morning time. He is feeling pretty good other than that. He stays active taking care of his property and has been going up and down his stairs. He can tell he's definitely improving. Comments: RTD none with Dr Kristin Leonardo  HEP Compliance:  [x] Good [] Fair [] Poor [] Reports not doing due to:    Vital Signs  Patient Currently in Pain: No (residual pain in the morning. \"But after Im up for about a half hour Im fine)   Pain Screening  Patient Currently in Pain: No (residual pain in the morning. \"But after Im up for about a half hour Im fine)    OBJECTIVE:   Exercises  Exercise 1: bike (4) x 6 mins, L9  Exercise 2: LTR 10 x 5\"  Exercise 3: SKTC 10 x 10\"  Exercise 4: ham stretch 3 x 30\"  Exercise 5: Piriformis stretch 5 x 20\"  Exercise 6: Abd draw in with SLR 2 x 5, 5 sec  Exercise 7: Abd draw in with bridging 2 x 10, 5-10 seconds  Exercise 8: Back Ext mach (5) 40# x 10  Exercise 9: Shoulder TA ext/row with tband (blue) 20 x 5\" ea    *Indicates exercise, modality, or manual techniques to be initiated when appropriate    Assessment:      Body structures, Functions, Activity limitations: Decreased functional mobility , Decreased ROM, Decreased strength, Decreased posture, Increased pain  Assessment: Pt completed lumbar/LE stab ROM and stab ex's to improve function. L LE weak compared to R LE during SLR. Resumed piriformis stretch d/t significant tightness in L>R hips. Tolerated increased weight on back machine today. Treatment Diagnosis: LBP with functional limitations due to stregth and ROM deficits lumbar and L>R LE  Prognosis: Good    Goals:  Short term goals  Time Frame for Short term goals: =LTG    Long term goals  Time Frame for Long term goals : 2-4 weeks  Long term goal 1: Indep HEP for symptom management - met  Long term goal 2: Pt demo improved overall function by reporting greater than 80% per functional survey score  Long term goal 3: Improve L LE strength 4/5 to allow patient to return to ambulating >15 min  Long term goal 4: Ambulate with no AD  safe/Indep community distances and uneven surfaces with  no deviations. - met  Long term goal 5: Hamstring flexibility -30° @ 90/90 to assist with improved posture awareness. Progress toward goals: pt progressing well with functional goals     POST-PAIN       Pain Rating (0-10 pain scale):   0/10   Location and pain description same as pre-treatment unless indicated. Action: [x] NA   [] Perform HEP  [] Meds as prescribed  [] Modalities as prescribed   [] Call Physician     Frequency/Duration:  Plan  Times per week: 2  Plan weeks: 2-4  Current Treatment Recommendations: Strengthening, ROM, Home Exercise Program, Modalities, Stair training, Gait Training, Balance Training, Manual Therapy - Soft Tissue Mobilization, Neuromuscular Re-education    Pt to continue current HEP. See objective section for any therapeutic exercise changes, additions or modifications this date.     PT Individual Minutes  Time In: 0930  Time Out: 1030  Minutes: 60  Timed Code Treatment Minutes: 59 Minutes     Timed Activity Minutes Units   Ther Ex 59 4       Signature:  Electronically signed by Colt Jama PTA on 7/7/21 at 10:00 AM EDT

## 2021-07-09 ENCOUNTER — HOSPITAL ENCOUNTER (OUTPATIENT)
Dept: PHYSICAL THERAPY | Age: 71
Setting detail: THERAPIES SERIES
Discharge: HOME | End: 2021-07-09
Payer: MEDICARE

## 2021-07-09 PROCEDURE — 97110 THERAPEUTIC EXERCISES: CPT

## 2021-07-09 NOTE — PROGRESS NOTES
[address]  [address]  VYUKN:[phone]    [] Certification  [x] Recertification []  Plan of Care  [x] Progress Note [] Discharge      To:  Dr Farshad Hernandez      From:  Gee Yousif, PT  Patient: Bj Stoner     : 1950  Diagnosis: Lumbar stenosis with neurogenic claudication s/p lumbar laminectomy     Date: 2021  Treatment Diagnosis: LBP with functional limitations due to stregth and ROM deficits lumbar and L>R LE    Plan of Care/Certification Expiration Date: 21  Progress Report Period from:  2021  to 2021    Total # of Visits to Date: 8   No Show: 0    Canceled Appointment: 0     OBJECTIVE:   Short Term Goals - Time Frame for Short term goals: =LTG      Long Term Goals - Time Frame for Long term goals : 2-4 weeks  Goals Current/ Discharge status Met   Long term goal 1: Indep HEP for symptom management - met Written HEP provided  for symptom management and is compliant  [x] yes  [] no   Long term goal 2: Pt demo improved overall function by reporting greater than 80% per functional survey score - met Exam: LEFS 62/64= 97% functional [x] yes  [] no   Long term goal 3: Improve L LE strength 5/5 to allow patient to transfer in/out of car without difficulty (goal updated) Strength RLE  Comment: 4+ to 5/5 hip/knee/ankle  Strength LLE  Comment: 4+ to 5/5 hip/knee/ankle [] yes  [x] no   Long term goal 4: Ambulate with no AD  safe/Indep community distances and uneven surfaces with  no deviations.  - met Pt reported met  [x] yes  [] no   Long term goal 5: Hamstring flexibility -25° @ 90/90 to assist with improved posture awareness and donning sock/shoes (updates   AROM RLE (degrees)  RLE General AROM: -34 degress R hamstring(supine)  AROM LLE (degrees)  LLE General AROM: -25 degrees L hamstring(with compensation in supine)  Spine  Lumbar: flex WNL, Ext 75% WNL, SB 50%   [] yes  [x] no      Body structures, Functions, Activity limitations: Decreased functional mobility , Decreased ROM, Decreased strength, Decreased posture, Increased pain  Assessment: Pt completed lumbar/LE stab ROM and stab ex's to improve function. Continues to demonstrate tightness in B hips during piriformis stretch and hamstrings. He is making good strength gains with ability to tolerate increased reps with SLR and increased weight on back ext machine. Pt would benefit from continued skilled therapy to work towards ROM/strength goals updated this date. Prognosis: Good  Discharge Recommendations: Continue to assess pending progress    PLAN: [x]   Frequency/Duration:  Plan  Times per week: 2  Plan weeks: 2-4  Current Treatment Recommendations: Strengthening, ROM, Home Exercise Program, Modalities, Stair training, Gait Training, Balance Training, Manual Therapy - Soft Tissue Mobilization, Neuromuscular Re-education  Plan Comment: pt would benefit from 2 more weeks of skillled therapy to work toward ROM goals                   Patient Status:[x] Continue plan of care    [] Discharge PT. Recommend pt continue with HEP. [x] Additional visits requested, Please re-certify for additional visits:4          Signature: Electronically signed by Soheila Carpio PTA on 7/9/21 at 10:59 AM EDT  Electronically signed by Agustin Severance, PT on 7/9/2021 at 4:52 PM      If you have any questions or concerns, please don't hesitate to call. Thank you for your referral.    I have reviewed this plan of care and certify a need for medically necessary rehabilitation services.     Physician Signature:__________________________________________________________  Date:  Please sign and return

## 2021-07-09 NOTE — PROGRESS NOTES
[address]  [address]  KGBSK:[phone]        Date: 2021  Patient: Alivia Mcfarlane  : 1950  ACCT #: [de-identified]  Referring Practitioner: Dr Sarah Beth Whitaker  Diagnosis: Lumbar stenosis with neurogenic claudication s/p lumbar laminectomy  Treatment Diagnosis: LBP with functional limitations due to stregth and ROM deficits lumbar and L>R LE    Visit Information:  PT Visit Information  Onset Date: 21  PT Insurance Information: Medicare  Total # of Visits Approved:  (BMN)  Total # of Visits to Date: 8  Plan of Care/Certification Expiration Date: 21  No Show: 0  Canceled Appointment: 0  Progress Note Counter:  (PN 21)    Subjective: Pt just wakes up with a lot of stiffness in his low back/hips due to arthritis. It's hard to put socks on in the morning time. He is feeling pretty good other than that. He stays active taking care of his property and has been going up and down his stairs. He can tell he's definitely improving. Comments: RTD none with Dr Jocelyn Greenberg  HEP Compliance:  [x] Good [] Fair [] Poor [] Reports not doing due to:    Vital Signs  Patient Currently in Pain: No (residual pain in the morning. \"But after Im up for about a half hour Im fine)   Pain Screening  Patient Currently in Pain: No (residual pain in the morning.  \"But after Im up for about a half hour Im fine)    OBJECTIVE:   Exercises  Exercise 1: bike (4) x 6 mins, L9  Exercise 2: LTR 10 x 5\"  Exercise 3: SKTC 5 x 20\"  Exercise 4: ham stretch 3 x 30\"  Exercise 5: Piriformis stretch 5 x 20\"  Exercise 6: Abd draw in with SLR 2 x 10, 5 sec  Exercise 7: Abd draw in with bridging 2 x 10, 5-10 seconds  Exercise 8: Back Ext mach (5) 40# x 10  Exercise 9: Shoulder TA ext/row with tband (blue) 20 x 5\" ea    Strength: [] NT  [x] MMT completed:  Strength RLE  Comment: 4+ to 5/5 hip/knee/ankle  Strength LLE  Comment: 4+ to 5/5 hip/knee/ankle    ROM: [] NT  [x] ROM measurements:  AROM RLE (degrees)  RLE General AROM: -34 degress R hamstring(supine)     AROM LLE (degrees)  LLE General AROM: -25 degrees L hamstring(with compensation in supine)     Spine  Lumbar: flex WNL, Ext 75% WNL, SB 50%    *Indicates exercise, modality, or manual techniques to be initiated when appropriate    Assessment: Body structures, Functions, Activity limitations: Decreased functional mobility , Decreased ROM, Decreased strength, Decreased posture, Increased pain  Assessment: Pt completed lumbar/LE stab ROM and stab ex's to improve function. Continues to demonstrate tightness in B hips during piriformis stretch and hamstrings. He is making good strength gains with ability to tolerate increased reps with SLR and increased weight on back ext machine. Pt would benefit from continued skilled therapy to work towards ROM goals. Treatment Diagnosis: LBP with functional limitations due to stregth and ROM deficits lumbar and L>R LE  Prognosis: Good    Goals:  Short term goals  Time Frame for Short term goals: =LTG    Long term goals  Time Frame for Long term goals : 2-4 weeks  Long term goal 1: Indep HEP for symptom management - met  Long term goal 2: Pt demo improved overall function by reporting greater than 80% per functional survey score - met  Long term goal 3: Improve L LE strength 4/5 to allow patient to return to ambulating >15 min - met  Long term goal 4: Ambulate with no AD  safe/Indep community distances and uneven surfaces with  no deviations. - met  Long term goal 5: Hamstring flexibility -30° @ 90/90 to assist with improved posture awareness. Progress toward goals: Pt has met all goals besides ROM goal     POST-PAIN       Pain Rating (0-10 pain scale):   0/10   Location and pain description same as pre-treatment unless indicated.    Action: [] NA   [x] Perform HEP  [] Meds as prescribed  [] Modalities as prescribed   [] Call Physician     Frequency/Duration:  Plan  Times per week: 2  Plan

## 2021-07-12 ENCOUNTER — HOSPITAL ENCOUNTER (OUTPATIENT)
Dept: PHYSICAL THERAPY | Age: 71
Setting detail: THERAPIES SERIES
Discharge: HOME | End: 2021-07-12
Payer: MEDICARE

## 2021-07-12 PROCEDURE — 97110 THERAPEUTIC EXERCISES: CPT

## 2021-07-12 NOTE — PROGRESS NOTES
[address]  [address]  QALVT:[phone]        Date: 2021  Patient: Rochelle Israel  : 1950  ACCT #: [de-identified]  Referring Practitioner: Dr Liliana Metzger  Diagnosis: Lumbar stenosis with neurogenic claudication s/p lumbar laminectomy  Treatment Diagnosis: LBP with functional limitations due to stregth and ROM deficits lumbar and L>R LE    Visit Information:  PT Visit Information  Onset Date: 21  PT Insurance Information: Medicare  Total # of Visits Approved:  (BMN)  Total # of Visits to Date: 9  Plan of Care/Certification Expiration Date: 21  No Show: 0  Canceled Appointment: 0  Progress Note Counter:  (PN next  due 21)    Subjective: Pt reports he is a little slow moving this morning but has no c/o of back pain. Says its impossible to bend over to put socks on but trys to remain active at home. Comments: RTD none with Dr Andres Dickinson  HEP Compliance:  [x] Good [] Fair [] Poor [] Reports not doing due to:    Vital Signs  Patient Currently in Pain: Other (comment) (\"not in my back, just the arthritis. \")   Pain Screening  Patient Currently in Pain: Other (comment) (\"not in my back, just the arthritis. \")    OBJECTIVE:   Exercises  Exercise 1: bike (4) x 6 mins, L9  Exercise 2: LTR 10 x 5\"  Exercise 3: SKTC 5 x 20\"  Exercise 4: ham stretch 3 x 30\"  Exercise 5: Piriformis stretch 5 x 20\"  Exercise 6: Abd draw in with SLR 2 x 10, 5 sec  Exercise 7: Abd draw in with bridging 2 x 10, 5-10 seconds  Exercise 8: Back Ext mach (5) 45# x 15  Exercise 9: Shoulder TA ext/row with tband (blue) 20 x 5\" ea  Exercise 20: HEP: additonal seated stretches to improve ROM given this date. Strength: [x] NT  [] MMT completed:    ROM: [x] NT  [] ROM measurements:      Assessment:         Body structures, Functions, Activity limitations: Decreased functional mobility , Decreased ROM, Decreased strength, Decreased posture, Increased pain  Assessment: Pt continued to tolerate lumber and LE ROM and stability exercises to increase fucntion and improve strength. Pt noted tightness in hamstrings and hips when performing supine hamstring and piriformis stretches. Pt continues to see strength gains in lower back and legs as he tolerated increased weight a repetitons on back et machine this date. Pt is improving with ROM goals but would benefit from additional skilled therapy to improve ROM. Treatment Diagnosis: LBP with functional limitations due to stregth and ROM deficits lumbar and L>R LE  Prognosis: Good       Goals:  Short term goals  Time Frame for Short term goals: =LTG    Long term goals  Time Frame for Long term goals : 2-4 weeks  Long term goal 1: Indep HEP for symptom management - met  Long term goal 2: Pt demo improved overall function by reporting greater than 80% per functional survey score - met  Long term goal 3: Improve L LE strength 5/5 to allow patient to transfer in/out of car without difficulty (goal updated)  Long term goal 4: Ambulate with no AD  safe/Indep community distances and uneven surfaces with  no deviations. - met  Long term goal 5: Hamstring flexibility -25° @ 90/90 to assist with improved posture awareness and donning sock/shoes (updates  Progress toward goals:    POST-PAIN       Pain Rating (0-10 pain scale):  0 \" no pain\"  /10   Location and pain description same as pre-treatment unless indicated. Action: [x] NA   [] Perform HEP  [] Meds as prescribed  [] Modalities as prescribed   [] Call Physician     Frequency/Duration:  Plan  Times per week: 2  Plan weeks: 2-4  Current Treatment Recommendations: Strengthening, ROM, Home Exercise Program, Modalities, Stair training, Gait Training, Balance Training, Manual Therapy - Soft Tissue Mobilization, Neuromuscular Re-education  Plan Comment: pt would benefit from 2 more weeks of skillled therapy to work toward ROM goals     Pt to continue current HEP.   See objective section for any therapeutic exercise changes, additions or modifications this date.          PT Individual Minutes  Time In: 3835  Time Out: 1264  Minutes: 45  Timed Code Treatment Minutes: 45 Minutes  Procedure Minutes:     Timed Activity Minutes Units   Ther Ex 45  3     Signature:  Electronically signed by Dm Moser PTA on 7/12/21 at 9:49 AM EDT

## 2021-07-14 ENCOUNTER — HOSPITAL ENCOUNTER (OUTPATIENT)
Dept: PHYSICAL THERAPY | Age: 71
Setting detail: THERAPIES SERIES
Discharge: HOME | End: 2021-07-14
Payer: MEDICARE

## 2021-07-14 PROCEDURE — 97110 THERAPEUTIC EXERCISES: CPT

## 2021-07-14 ASSESSMENT — PAIN DESCRIPTION - LOCATION: LOCATION: BACK

## 2021-07-14 ASSESSMENT — PAIN DESCRIPTION - ORIENTATION: ORIENTATION: LOWER

## 2021-07-14 ASSESSMENT — PAIN DESCRIPTION - DESCRIPTORS: DESCRIPTORS: ACHING

## 2021-07-14 ASSESSMENT — PAIN SCALES - GENERAL: PAINLEVEL_OUTOF10: 0

## 2021-07-14 NOTE — PROGRESS NOTES
[address]  [address]  [id number]:[phone]        Date: 2021  Patient: Leann Wolff  : 1950  ACCT #: [de-identified]  Referring Practitioner: Dr Cornelius Akins  Diagnosis: Lumbar stenosis with neurogenic claudication s/p lumbar laminectomy  Treatment Diagnosis: LBP with functional limitations due to stregth and ROM deficits lumbar and L>R LE    Visit Information:  PT Visit Information  Onset Date: 21  PT Insurance Information: Medicare  Total # of Visits Approved:  (BMN)  Total # of Visits to Date: 10  Plan of Care/Certification Expiration Date: 21  No Show: 0  Canceled Appointment: 0  Progress Note Counter:  (PN next  due 21)    Subjective: Pt reports feeling  pretty good this date and only complains of tightness when getting out of bed. Pt  reports therapy as been helpign reduce tightness and keep pain consistently low. Comments: RTD none with Dr My Arzola  HEP Compliance:  [x] Good [] Fair [] Poor [] Reports not doing due to:    Vital Signs  Patient Currently in Pain: Other (comment) (\"not in my back, just the arthritis. \")   Pain Screening  Patient Currently in Pain: Other (comment) (\"not in my back, just the arthritis. \")  Pain Assessment  Pain Assessment: 0-10  Pain Level: 0  Pain Location: Back  Pain Orientation: Lower  Pain Descriptors: Aching    OBJECTIVE:   Exercises  Exercise 1: bike (4) x 6 mins, L9  Exercise 2: LTR 10 x 5\"  Exercise 3: SKTC 5 x 20\"  Exercise 4: ham stretch 3 x 30\"  Exercise 5: Piriformis stretch 5 x 20\"  Exercise 6: Abd draw in with SLR 2 x 10, 5 sec  Exercise 7: Abd draw in with bridging 2 x 10, 5-10 seconds  Exercise 8: Back Ext mach (5) 45# x 15  Exercise 9: Shoulder TA ext/row with tband (blue) 20 x 5\" ea  Exercise 20: HEP: additonal seated stretches to improve ROM given this date. Strength: [x] NT  [] MMT completed:    ROM: [x] NT  [] ROM measurements:      Assessment:         Body structures, Functions, Activity limitations: Decreased functional mobility , Decreased ROM, Decreased strength, Decreased posture, Increased pain  Assessment: Pt tolerated gentle lumbar, piriformis, and hamstring stretches well this date. Pt noted tightness in hamstrings and piriformis while perfoming stretches. Pt progress displayed fatigue when performing Hip Bridges and SLR with abdominal draw ins. Treatment Diagnosis: LBP with functional limitations due to stregth and ROM deficits lumbar and L>R LE  Prognosis: Good       Goals:  Short term goals  Time Frame for Short term goals: =LTG    Long term goals  Time Frame for Long term goals : 2-4 weeks  Long term goal 1: Indep HEP for symptom management - met  Long term goal 2: Pt demo improved overall function by reporting greater than 80% per functional survey score - met  Long term goal 3: Improve L LE strength 5/5 to allow patient to transfer in/out of car without difficulty (goal updated)  Long term goal 4: Ambulate with no AD  safe/Indep community distances and uneven surfaces with  no deviations. - met  Long term goal 5: Hamstring flexibility -25° @ 90/90 to assist with improved posture awareness and donning sock/shoes (updates  Progress toward goals: Progressing toward strength goals and improving function. POST-PAIN       Pain Rating (0-10 pain scale):  0 /10   Location and pain description same as pre-treatment unless indicated. Action: [x] NA   [] Perform HEP  [] Meds as prescribed  [] Modalities as prescribed   [] Call Physician     Frequency/Duration:  Plan  Times per week: 2  Plan weeks: 2-4  Current Treatment Recommendations: Strengthening, ROM, Home Exercise Program, Modalities, Stair training, Gait Training, Balance Training, Manual Therapy - Soft Tissue Mobilization, Neuromuscular Re-education  Plan Comment: pt would benefit from 2 more weeks of skillled therapy to work toward ROM goals     Pt to continue current HEP.   See objective section for any therapeutic exercise changes, additions or modifications this date.          PT Individual Minutes  Time In: 0774  Time Out: 1018  Minutes: 47  Timed Code Treatment Minutes: 45 Minutes  Procedure Minutes:     Timed Activity Minutes Units   Ther Ex  45 3     Signature:  Electronically signed by Dede Soriano PTA on 7/14/21 at 10:50 AM EDT

## 2021-07-19 ENCOUNTER — HOSPITAL ENCOUNTER (OUTPATIENT)
Dept: PHYSICAL THERAPY | Age: 71
Setting detail: THERAPIES SERIES
Discharge: HOME | End: 2021-07-19
Payer: MEDICARE

## 2021-07-19 PROCEDURE — 97110 THERAPEUTIC EXERCISES: CPT

## 2021-07-19 NOTE — PROGRESS NOTES
[address]  [address]  [id number]:[phone]        Date: 2021  Patient: Bita Rodriguez  : 1950  ACCT #: [de-identified]  Referring Practitioner: Dr Chen Sanchez  Diagnosis: Lumbar stenosis with neurogenic claudication s/p lumbar laminectomy  Treatment Diagnosis: LBP with functional limitations due to stregth and ROM deficits lumbar and L>R LE    Visit Information:  PT Visit Information  Onset Date: 21  PT Insurance Information: Medicare  Total # of Visits Approved:  (BMN)  Total # of Visits to Date:   Plan of Care/Certification Expiration Date: 21  No Show: 0  Canceled Appointment: 0  Progress Note Counter: 3/4 (PN next  due 21)    Subjective: Pt states he is doing good this morning. No c/o pain just some stiffness d/t arthritis. Comments: RTD none with Dr Peter Arita  HEP Compliance:  [x] Good [] Fair [] Poor [] Reports not doing due to:    Vital Signs  Patient Currently in Pain: No   Pain Screening  Patient Currently in Pain: No    OBJECTIVE:   Exercises  Exercise 1: bike (4) x 6 mins, L9  Exercise 2: LTR 10 x 5\"  Exercise 3: SKTC 5 x 20\"  Exercise 4: ham stretch 3 x 30\"  Exercise 5: Piriformis stretch 5 x 20\"  Exercise 6: Abd draw in with SLR 2 x 10, 5 sec  Exercise 7: Abd draw in with bridging 2 x 10, 5-10 seconds  Exercise 8: Back Ext mach (5) 45# x 15  Exercise 9: Shoulder TA ext/row with tband (blue) 20 x 5\" ea       Strength: [x] NT  [] MMT completed:      ROM: [x] NT  [] ROM measurements:        *Indicates exercise, modality, or manual techniques to be initiated when appropriate    Assessment: Body structures, Functions, Activity limitations: Decreased functional mobility , Decreased ROM, Decreased strength, Decreased posture, Increased pain  Assessment: Pt continued with progression through post op lumbar rehab. He demonstrates increased trunk strength, he has progressed to 2 x 10 reps with bridging and SLR.  He is tolerating functional tasks and ADL's without impairments. Treatment Diagnosis: LBP with functional limitations due to stregth and ROM deficits lumbar and L>R LE  Prognosis: Good       Goals:  Short term goals  Time Frame for Short term goals: =LTG    Long term goals  Time Frame for Long term goals : 2-4 weeks  Long term goal 1: Indep HEP for symptom management - met  Long term goal 2: Pt demo improved overall function by reporting greater than 80% per functional survey score - met  Long term goal 3: Improve L LE strength 5/5 to allow patient to transfer in/out of car without difficulty (goal updated)  Long term goal 4: Ambulate with no AD  safe/Indep community distances and uneven surfaces with  no deviations. - met  Long term goal 5: Hamstring flexibility -25° @ 90/90 to assist with improved posture awareness and donning sock/shoes (updates  Progress toward goals: Pt performing post op lumbar stability and core exercises for meeting long term goal 2. POST-PAIN       Pain Rating (0-10 pain scale):   0/10   Location and pain description same as pre-treatment unless indicated. Action: [x] NA   [] Perform HEP  [] Meds as prescribed  [] Modalities as prescribed   [] Call Physician     Frequency/Duration:  Plan  Times per week: 2  Plan weeks: 2-4  Current Treatment Recommendations: Strengthening, ROM, Home Exercise Program, Modalities, Stair training, Gait Training, Balance Training, Manual Therapy - Soft Tissue Mobilization, Neuromuscular Re-education  Plan Comment: pt would benefit from 2 more weeks of skillled therapy to work toward ROM goals     Pt to continue current HEP. See objective section for any therapeutic exercise changes, additions or modifications this date.        PT Individual Minutes  Time In: 4965  Time Out: 1713  Minutes: 45  Timed Code Treatment Minutes: 41 Minutes   Timed Activity Minutes Units   Ther Ex 41 3     Signature:  Electronically signed by Rimma Meza PTA on 7/19/21 at 9:57 AM EDT

## 2021-07-21 ENCOUNTER — HOSPITAL ENCOUNTER (OUTPATIENT)
Dept: PHYSICAL THERAPY | Age: 71
Setting detail: THERAPIES SERIES
Discharge: HOME | End: 2021-07-21
Payer: MEDICARE

## 2021-07-21 LAB
ALBUMIN SERPL-MCNC: 4.2 G/DL (ref 3.5–4.6)
ALP BLD-CCNC: 84 U/L (ref 35–104)
ALT SERPL-CCNC: 24 U/L (ref 0–41)
ANION GAP SERPL CALCULATED.3IONS-SCNC: 12 MEQ/L (ref 9–15)
AST SERPL-CCNC: 26 U/L (ref 0–40)
BILIRUB SERPL-MCNC: 0.4 MG/DL (ref 0.2–0.7)
BILIRUBIN DIRECT: <0.2 MG/DL (ref 0–0.4)
BILIRUBIN, INDIRECT: NORMAL MG/DL (ref 0–0.6)
BUN BLDV-MCNC: 12 MG/DL (ref 8–23)
CALCIUM SERPL-MCNC: 9.4 MG/DL (ref 8.5–9.9)
CHLORIDE BLD-SCNC: 104 MEQ/L (ref 95–107)
CHOLESTEROL, TOTAL: 116 MG/DL (ref 0–199)
CO2: 22 MEQ/L (ref 20–31)
CREAT SERPL-MCNC: 0.81 MG/DL (ref 0.7–1.2)
GFR AFRICAN AMERICAN: >60
GFR NON-AFRICAN AMERICAN: >60
GLUCOSE BLD-MCNC: 105 MG/DL (ref 70–99)
HBA1C MFR BLD: 5.1 % (ref 4.8–5.9)
HCT VFR BLD CALC: 44.7 % (ref 42–52)
HDLC SERPL-MCNC: 38 MG/DL (ref 40–59)
HEMOGLOBIN: 14.9 G/DL (ref 14–18)
LDL CHOLESTEROL CALCULATED: 68 MG/DL (ref 0–129)
MAGNESIUM: 1.9 MG/DL (ref 1.7–2.4)
MCH RBC QN AUTO: 31.7 PG (ref 27–31.3)
MCHC RBC AUTO-ENTMCNC: 33.4 % (ref 33–37)
MCV RBC AUTO: 94.9 FL (ref 80–100)
PDW BLD-RTO: 12.8 % (ref 11.5–14.5)
PLATELET # BLD: 389 K/UL (ref 130–400)
POTASSIUM SERPL-SCNC: 4.4 MEQ/L (ref 3.4–4.9)
RBC # BLD: 4.71 M/UL (ref 4.7–6.1)
SODIUM BLD-SCNC: 138 MEQ/L (ref 135–144)
TOTAL PROTEIN: 6.9 G/DL (ref 6.3–8)
TRIGL SERPL-MCNC: 50 MG/DL (ref 0–150)
TSH SERPL DL<=0.05 MIU/L-ACNC: 1.52 UIU/ML (ref 0.44–3.86)
WBC # BLD: 9.6 K/UL (ref 4.8–10.8)

## 2021-07-21 PROCEDURE — 97110 THERAPEUTIC EXERCISES: CPT

## 2021-07-21 ASSESSMENT — PAIN DESCRIPTION - LOCATION: LOCATION: BACK

## 2021-07-21 ASSESSMENT — PAIN - FUNCTIONAL ASSESSMENT: PAIN_FUNCTIONAL_ASSESSMENT: ACTIVITIES ARE NOT PREVENTED

## 2021-07-21 NOTE — PROGRESS NOTES
[address]  [address]  [id number]:[phone]        Date: 2021  Patient: Catalina Green  : 1950  ACCT #: [de-identified]  Referring Practitioner: Dr Jeffery Mcbride  Diagnosis: Lumbar stenosis with neurogenic claudication s/p lumbar laminectomy  Treatment Diagnosis: LBP with functional limitations due to stregth and ROM deficits lumbar and L>R LE    Visit Information:  PT Visit Information  Onset Date: 21  PT Insurance Information: Medicare  Total # of Visits Approved:  (BMN)  Total # of Visits to Date: 12  Plan of Care/Certification Expiration Date: 21  No Show: 0  Canceled Appointment: 0  Progress Note Counter:     Subjective: Pt states no c/o pain just some stiffness. It is from his arthritis and usually only the first hour of the morning and eases up. He is pleased with how he is feeling, he does not have any issues tolerating ADL's.      HEP Compliance:  [x] Good [] Fair [] Poor [] Reports not doing due to:    Vital Signs  Patient Currently in Pain: No   Pain Screening  Patient Currently in Pain: No  Pain Assessment  Pain Location: Back  Functional Pain Assessment: Activities are not prevented    OBJECTIVE:   Exercises  Exercise 1: bike (4) x 6 mins, L9  Exercise 2: LTR 10 x 5\"  Exercise 3: SKTC 5 x 20\"  Exercise 4: ham stretch 3 x 30\"  Exercise 5: Piriformis stretch 5 x 20\"  Exercise 6: Abd draw in with SLR 2 x 10, 5 sec  Exercise 7: Abd draw in with bridging 2 x 10, 5-10 seconds  Exercise 8: Back Ext mach (5) 45# x 15  Exercise 9: Shoulder TA ext/row with tband (blue) 20 x 5\" ea    Strength: [] NT  [x] MMT completed:  Strength RLE  Comment:  hip/knee/ankle  Strength LLE  Comment:  hip/knee/ankle      ROM: [] NT  [x] ROM measurements:     AROM RLE (degrees)  RLE General AROM: -25 degress R hamstring(supine)     AROM LLE (degrees)  LLE General AROM: -20 degrees L hamstring      Spine  Lumbar: flex WNL, Ext 75% WNL, SB 50%    *Indicates exercise, modality, or manual techniques to be initiated when appropriate    Assessment: Body structures, Functions, Activity limitations: Decreased functional mobility , Decreased ROM, Decreased strength, Decreased posture, Increased pain  Assessment: Pt has met goals per PT POC. He has improved with trunk strength, B hamstring length,and trunk AROM. He is tolerating functional tasks, ambulation, and ADL's without impairement. Treatment Diagnosis: LBP with functional limitations due to stregth and ROM deficits lumbar and L>R LE  Prognosis: Good       Goals:  Short term goals  Time Frame for Short term goals: =LTG    Long term goals  Time Frame for Long term goals : 2-4 weeks  Long term goal 1: Indep HEP for symptom management - met  Long term goal 2: Pt demo improved overall function by reporting greater than 80% per functional survey score - met  Long term goal 3: Improve L LE strength 5/5 to allow patient to transfer in/out of car without difficulty (goal updated)  Long term goal 4: Ambulate with no AD  safe/Indep community distances and uneven surfaces with  no deviations. - met  Long term goal 5: Hamstring flexibility -25° @ 90/90 to assist with improved posture awareness and donning sock/shoes (updates     Progress toward goals: Pt has met long term goals per post op low back PT POC. POST-PAIN       Pain Rating (0-10 pain scale):  0/10   Location and pain description same as pre-treatment unless indicated. Action: [x] NA   [] Perform HEP  [] Meds as prescribed  [] Modalities as prescribed   [] Call Physician     Frequency/Duration:  Plan  Times per week: 2  Plan weeks: 2-4  Current Treatment Recommendations: Strengthening, ROM, Home Exercise Program, Modalities, Stair training, Gait Training, Balance Training, Manual Therapy - Soft Tissue Mobilization, Neuromuscular Re-education     Pt to continue current HEP.   See objective section for any therapeutic exercise changes, additions or modifications this date.        PT Individual Minutes  Time In: 8323  Time Out: 1016  Minutes: 49  Timed Code Treatment Minutes: 42 Minutes     Timed Activity Minutes Units   Ther Ex 42 3     Signature:  Electronically signed by Jurgen Becerril PTA on 7/21/21 at 9:31 AM EDT

## 2021-07-21 NOTE — PROGRESS NOTES
[address]  [address]  [id number]:[phone]    [] Certification  [] Recertification []  Plan of Care  [] Progress Note [x] Discharge      To:  Dr Marcell Logan      From:  Elodia Marx, PT  Patient: Maciel Lagunas     : 1950  Diagnosis: Lumbar stenosis with neurogenic claudication s/p lumbar laminectomy     Date: 2021  Treatment Diagnosis: LBP with functional limitations due to stregth and ROM deficits lumbar and L>R LE    Plan of Care/Certification Expiration Date: 21  Progress Report Period from:  2021  to 2021    Total # of Visits to Date: 12   No Show: 0    Canceled Appointment: 0     OBJECTIVE:   Short Term Goals - Time Frame for Short term goals: =LTG    Goals Current/Discharge status  Met   Long Term Goals - Time Frame for Long term goals : 2-4 weeks  Goals Current/ Discharge status Met   Long term goal 1: Indep HEP for symptom management - met Pt independent with issued low back HEP [x] yes  [] no   Long term goal 2: Pt demo improved overall function by reporting greater than 80% per functional survey score - met Exam: LEFS 62/64= 97% functional [x] yes  [] no   Long term goal 3: Improve L LE strength 5/5 to allow patient to transfer in/out of car without difficulty (goal updated) Strength RLE  Comment: 5 hip/knee/ankle  Strength LLE  Comment: /5 hip/knee/ankle          [x] yes  [] no   Long term goal 4: Ambulate with no AD  safe/Indep community distances and uneven surfaces with  no deviations.  - met Pt ambulating community distance and negotiating uneven surfaces without deviations [x] yes  [] no   Long term goal 5: Hamstring flexibility -25° @ 90/90 to assist with improved posture awareness and donning sock/shoes (updates    AROM RLE (degrees)  RLE General AROM: -25 degress R hamstring(supine)     AROM LLE (degrees)  LLE General AROM: -20 degrees L hamstring      Spine  Lumbar: flex WNL, Ext 75% WNL, SB 50%   [x] yes  [] no    Body structures, Functions, Activity limitations: Decreased functional mobility , Decreased ROM, Decreased strength, Decreased posture, Increased pain  Assessment: Pt has met goals per PT POC. He has improved with trunk strength, B hamstring length,and trunk AROM. He is tolerating functional tasks, ambulation, and ADL's without impairement. Pt has good knowledge Hep to cont symptom  mgmt Indep. Prognosis: Good       PLAN: [x] D/C                  Patient Status:[] Continue/ Initiate plan of Care    [x] Discharge PT. Recommend pt continue with HEP. [] Additional visits requested, Please re-certify for additional visits:          Signature: Electronically signed by Maria Guadalupe Gamble PTA on 7/21/21 at 11:26 AM EDT    Electronically signed by Leda Gupta PT on 7/21/2021 at 4:15 PM    If you have any questions or concerns, please don't hesitate to call. Thank you for your referral.    I have reviewed this plan of care and certify a need for medically necessary rehabilitation services.     Physician Signature:__________________________________________________________  Date:  Please sign and return

## 2021-07-26 ENCOUNTER — APPOINTMENT (OUTPATIENT)
Dept: PHYSICAL THERAPY | Age: 71
End: 2021-07-26
Payer: MEDICARE

## 2021-07-28 ENCOUNTER — APPOINTMENT (OUTPATIENT)
Dept: PHYSICAL THERAPY | Age: 71
End: 2021-07-28
Payer: MEDICARE

## 2021-10-06 ENCOUNTER — OFFICE VISIT (OUTPATIENT)
Dept: FAMILY MEDICINE CLINIC | Age: 71
End: 2021-10-06
Payer: MEDICARE

## 2021-10-06 VITALS
WEIGHT: 200 LBS | TEMPERATURE: 97.3 F | HEIGHT: 67 IN | SYSTOLIC BLOOD PRESSURE: 130 MMHG | OXYGEN SATURATION: 97 % | BODY MASS INDEX: 31.39 KG/M2 | RESPIRATION RATE: 15 BRPM | DIASTOLIC BLOOD PRESSURE: 77 MMHG | HEART RATE: 78 BPM

## 2021-10-06 DIAGNOSIS — Z72.0 TOBACCO USE: ICD-10-CM

## 2021-10-06 DIAGNOSIS — N52.9 ERECTILE DYSFUNCTION, UNSPECIFIED ERECTILE DYSFUNCTION TYPE: ICD-10-CM

## 2021-10-06 DIAGNOSIS — Z12.11 SCREENING FOR COLON CANCER: ICD-10-CM

## 2021-10-06 DIAGNOSIS — G47.00 INSOMNIA, UNSPECIFIED TYPE: Primary | ICD-10-CM

## 2021-10-06 DIAGNOSIS — Z23 ENCOUNTER FOR IMMUNIZATION: ICD-10-CM

## 2021-10-06 PROCEDURE — 90694 VACC AIIV4 NO PRSRV 0.5ML IM: CPT | Performed by: INTERNAL MEDICINE

## 2021-10-06 PROCEDURE — 3017F COLORECTAL CA SCREEN DOC REV: CPT | Performed by: INTERNAL MEDICINE

## 2021-10-06 PROCEDURE — 4004F PT TOBACCO SCREEN RCVD TLK: CPT | Performed by: INTERNAL MEDICINE

## 2021-10-06 PROCEDURE — G8484 FLU IMMUNIZE NO ADMIN: HCPCS | Performed by: INTERNAL MEDICINE

## 2021-10-06 PROCEDURE — G8417 CALC BMI ABV UP PARAM F/U: HCPCS | Performed by: INTERNAL MEDICINE

## 2021-10-06 PROCEDURE — G8427 DOCREV CUR MEDS BY ELIG CLIN: HCPCS | Performed by: INTERNAL MEDICINE

## 2021-10-06 PROCEDURE — 99214 OFFICE O/P EST MOD 30 MIN: CPT | Performed by: INTERNAL MEDICINE

## 2021-10-06 PROCEDURE — 4040F PNEUMOC VAC/ADMIN/RCVD: CPT | Performed by: INTERNAL MEDICINE

## 2021-10-06 PROCEDURE — 1123F ACP DISCUSS/DSCN MKR DOCD: CPT | Performed by: INTERNAL MEDICINE

## 2021-10-06 PROCEDURE — G0008 ADMIN INFLUENZA VIRUS VAC: HCPCS | Performed by: INTERNAL MEDICINE

## 2021-10-06 RX ORDER — TADALAFIL 10 MG/1
10 TABLET ORAL PRN
Qty: 30 TABLET | Refills: 5 | Status: SHIPPED | OUTPATIENT
Start: 2021-10-20

## 2021-10-06 RX ORDER — TRAZODONE HYDROCHLORIDE 50 MG/1
50 TABLET ORAL NIGHTLY
Qty: 90 TABLET | Refills: 3 | Status: SHIPPED | OUTPATIENT
Start: 2021-10-06 | End: 2022-10-03 | Stop reason: SDUPTHER

## 2021-10-06 ASSESSMENT — ENCOUNTER SYMPTOMS
ABDOMINAL PAIN: 0
BACK PAIN: 0
EYE PAIN: 0
SHORTNESS OF BREATH: 0

## 2021-10-06 NOTE — PROGRESS NOTES
Vaccine Information Sheet, \"Influenza - Inactivated\"  given to Krzysztof Hunter, or parent/legal guardian of  Krzysztof Hunter and verbalized understanding. Patient responses:    Have you ever had a reaction to a flu vaccine? No  Are you able to eat eggs without adverse effects? Yes  Do you have any current illness? No  Have you ever had Guillian [place] Syndrome? No    Flu vaccine given per order. Please see immunization tab.

## 2021-10-06 NOTE — PROGRESS NOTES
Subjective:      Patient ID: Raven Shi is a 73 y.o. male who presents today with:  Chief Complaint   Patient presents with    Establish Care     prev PCP Salvador Reyes Discuss Medications     patient mentions he would like to discuss increasing Trazodone     Flu Vaccine     will discuss with PCP        HPI    Past Medical History:   Diagnosis Date    Anxiety     CAD (coronary artery disease)     had CABG    HIGH CHOLESTEROL     Hypercholesterolemia     Hyperlipidemia     Hypertension     Keratosis     Neuropathy     Psoriasis     Sleep apnea      Past Surgical History:   Procedure Laterality Date    CARDIAC SURGERY      CARPAL TUNNEL RELEASE Left     COLONOSCOPY  9-10-12    dr Gutiérrez He /2 polyps, diverticulosis, int hemmorrhoids    CORONARY ARTERY BYPASS GRAFT  12    CABG X 3    LAMINECTOMY Bilateral 2021    BILATERAL L 3-4 MICRODECOMPRESSION performed by Bridget Mcghee MD at [address] [place] [address] N/CARPAL TUNNEL SURG Right 10/30/2018    RIGHT WRIST CARPAL TUNNEL RELEASE SUPINE performed by Hassel Severe, MD at [facility] History     Socioeconomic History    Marital status:      Spouse name: Not on file    Number of children: Not on file    Years of education: Not on file    Highest education level: Not on file   Occupational History    Not on file   Tobacco Use    Smoking status: Light Tobacco Smoker     Packs/day: 0.50     Years: 20.00     Pack years: 10.00     Types: Cigarettes     Last attempt to quit: 2012     Years since quittin.7    Smokeless tobacco: Never Used    Tobacco comment: mentions only when he is drinking    Vaping Use    Vaping Use: Never used   Substance and Sexual Activity    Alcohol use: Yes     Comment: social     Drug use: No    Sexual activity: Not Currently   Other Topics Concern    Not on file   Social History Narrative    Not on file     Social Determinants of Health Financial Resource Strain: Low Risk     Difficulty of Paying Living Expenses: Not hard at all   Food Insecurity: No Food Insecurity    Worried About Running Out of Food in the Last Year: Never true    Leatha of Food in the Last Year: Never true   Transportation Needs: No Transportation Needs    Lack of Transportation (Medical): No    Lack of Transportation (Non-Medical): No   Physical Activity:     Days of Exercise per Week:     Minutes of Exercise per Session:    Stress:     Feeling of Stress :    Social Connections:     Frequency of Communication with Friends and Family:     Frequency of Social Gatherings with Friends and Family:     Attends Zoroastrianism Services:     Active Member of Clubs or Organizations:     Attends Club or Organization Meetings:     Marital Status:    Intimate Partner Violence:     Fear of Current or Ex-Partner:     Emotionally Abused:     Physically Abused:     Sexually Abused:      No Known Allergies  Current Outpatient Medications on File Prior to Visit   Medication Sig Dispense Refill    nitroGLYCERIN (NITROSTAT) 0.4 MG SL tablet DISSOLVE 1 TABLET UNDER THE TONGUE AS NEEDED FOR CHEST PAIN- MAY REPEAT EVERY 5 MINUTES IF NEEDED ( MAX 3 DOSES.- IF NO RELIEF CALL 911)      metoprolol succinate (TOPROL XL) 50 MG extended release tablet TAKE 1 TABLET BY MOUTH EVERY DAY 90 tablet 3    lisinopril (PRINIVIL;ZESTRIL) 20 MG tablet Take 1 tablet by mouth 2 times daily. 180 tablet 3    atorvastatin (LIPITOR) 80 MG tablet Take 1 tablet by mouth daily. 90 tablet 3    aspirin 81 MG EC tablet Take 81 mg by mouth daily. No current facility-administered medications on file prior to visit. I have personally reviewed the ROS, PMH, PFH, and social history     Review of Systems   Constitutional: Negative for chills. HENT: Negative for congestion. Eyes: Negative for pain. Respiratory: Negative for shortness of breath. Cardiovascular: Negative for chest pain. Gastrointestinal: Negative for abdominal pain. Genitourinary: Negative for hematuria. Musculoskeletal: Negative for back pain. Allergic/Immunologic: Negative for immunocompromised state. Neurological: Negative for headaches. Psychiatric/Behavioral: Negative for hallucinations. Objective:   /77 (Site: Left Upper Arm, Position: Sitting, Cuff Size: Large Adult)   Pulse 78   Temp 97.3 °F (36.3 °C) (Oral)   Resp 15   Ht 5' 7\" (1.702 m)   Wt 200 lb (90.7 kg)   SpO2 97%   BMI 31.32 kg/m²     Physical Exam  Constitutional:       General: He is not in acute distress. Appearance: Normal appearance. He is not ill-appearing, toxic-appearing or diaphoretic. HENT:      Head: Normocephalic. Neck:      Vascular: No carotid bruit. Cardiovascular:      Rate and Rhythm: Normal rate and regular rhythm. Pulses: Normal pulses. Heart sounds: Normal heart sounds. No murmur heard. No friction rub. No gallop. Pulmonary:      Effort: Pulmonary effort is normal. No respiratory distress. Breath sounds: Normal breath sounds. No wheezing, rhonchi or rales. Abdominal:      General: Abdomen is flat. There is no distension. Palpations: Abdomen is soft. Tenderness: There is no abdominal tenderness. There is no right CVA tenderness, left CVA tenderness, guarding or rebound. Musculoskeletal:      Cervical back: Neck supple. Right lower leg: No edema. Left lower leg: No edema. Skin:     General: Skin is warm. Findings: No erythema or rash. Neurological:      Mental Status: He is alert. Psychiatric:         Mood and Affect: Mood normal.           Assessment:       Diagnosis Orders   1. Insomnia, unspecified type  traZODone (DESYREL) 50 MG tablet    CBC Auto Differential    Comprehensive Metabolic Panel   2. Erectile dysfunction, unspecified erectile dysfunction type  tadalafil (CIALIS) 10 MG tablet    CBC Auto Differential    Comprehensive Metabolic Panel   3. Tobacco use  US SCREENING FOR AAA    CBC Auto Differential    Comprehensive Metabolic Panel   4. Screening for colon cancer  [address] MD, Gastroenterology, [place]    CBC Auto Differential    Comprehensive Metabolic Panel   5. Encounter for immunization  INFLUENZA, QUADV, ADJUVANTED, 72 YRS =, IM, PF, PREFILL SYR, 0.5ML (FLUAD)    CBC Auto Differential    Comprehensive Metabolic Panel         Plan:     vc  Increase trazodone  Add cialis   See orders      Orders Placed This Encounter   Procedures    US SCREENING FOR AAA     Standing Status:   Future     Standing Expiration Date:   10/6/2022    INFLUENZA, QUADV, ADJUVANTED, 72 YRS =, IM, PF, PREFILL SYR, 0.5ML (FLUAD)    CBC Auto Differential     Standing Status:   Future     Standing Expiration Date:   10/6/2022    Comprehensive Metabolic Panel     Standing Status:   Future     Standing Expiration Date:   10/6/2022   [facility], Gastroenterology, [facility]     Referral Priority:   Routine     Referral Type:   Eval and Treat     Referral Reason:   Specialty Services Required     Referred to Provider:   Sherman Badillo MD     Requested Specialty:   Gastroenterology     Number of Visits Requested:   1     Orders Placed This Encounter   Medications    tadalafil (CIALIS) 10 MG tablet     Sig: Take 1 tablet by mouth as needed for Erectile Dysfunction Don't fill until 10/20/2021. Max 1 tablet po once daily     Dispense:  30 tablet     Refill:  5    traZODone (DESYREL) 50 MG tablet     Sig: Take 1 tablet by mouth nightly     Dispense:  90 tablet     Refill:  3   risk of priapism explained   HE DOES NOT take nitro  He knows not to take cialis within 72 hours of ntg  unssafe drop in bp  Given rx for pde inhibitor, knows not to take any new medications without me knowing as it can cause an unsafe drop in blood pressure. Any visual complaints call me as well. AS LONG as tolerating trazodone at higher dose then can add in cialis.    If anything should change or worsen call ASAP, don't wait for next scheduled appointment. Return for worsening symptoms, call ASAP for appointment, Chronic condition management/appointment.       Bettye Mayfield MD

## 2021-10-22 ENCOUNTER — HOSPITAL ENCOUNTER (OUTPATIENT)
Dept: ULTRASOUND IMAGING | Age: 71
Discharge: HOME | End: 2021-10-24
Payer: MEDICARE

## 2021-10-22 DIAGNOSIS — Z72.0 TOBACCO USE: ICD-10-CM

## 2021-10-22 PROCEDURE — 76706 US ABDL AORTA SCREEN AAA: CPT

## 2021-10-27 RX ORDER — POLYETHYLENE GLYCOL 3350, SODIUM CHLORIDE, SODIUM BICARBONATE, POTASSIUM CHLORIDE 420; 11.2; 5.72; 1.48 G/4L; G/4L; G/4L; G/4L
4000 POWDER, FOR SOLUTION ORAL ONCE
Qty: 4000 ML | Refills: 0 | Status: SHIPPED | OUTPATIENT
Start: 2021-10-27 | End: 2021-10-27

## 2021-11-16 ENCOUNTER — ANCILLARY PROCEDURE (OUTPATIENT)
Dept: ENDOSCOPY | Age: 71
End: 2021-11-16
Attending: SPECIALIST
Payer: MEDICARE

## 2021-11-16 ENCOUNTER — HOSPITAL ENCOUNTER (OUTPATIENT)
Age: 71
Setting detail: OUTPATIENT SURGERY
Discharge: HOME | End: 2021-11-16
Attending: SPECIALIST | Admitting: SPECIALIST
Payer: MEDICARE

## 2021-11-16 ENCOUNTER — ANESTHESIA EVENT (OUTPATIENT)
Dept: ENDOSCOPY | Age: 71
End: 2021-11-16
Payer: MEDICARE

## 2021-11-16 ENCOUNTER — ANESTHESIA (OUTPATIENT)
Dept: ENDOSCOPY | Age: 71
End: 2021-11-16
Payer: MEDICARE

## 2021-11-16 VITALS
HEART RATE: 74 BPM | HEIGHT: 67 IN | RESPIRATION RATE: 18 BRPM | OXYGEN SATURATION: 97 % | SYSTOLIC BLOOD PRESSURE: 115 MMHG | BODY MASS INDEX: 31.08 KG/M2 | WEIGHT: 198 LBS | DIASTOLIC BLOOD PRESSURE: 58 MMHG | TEMPERATURE: 97.4 F

## 2021-11-16 VITALS
RESPIRATION RATE: 11 BRPM | SYSTOLIC BLOOD PRESSURE: 128 MMHG | OXYGEN SATURATION: 96 % | DIASTOLIC BLOOD PRESSURE: 60 MMHG

## 2021-11-16 PROCEDURE — 3700000001 HC ADD 15 MINUTES (ANESTHESIA): Performed by: SPECIALIST

## 2021-11-16 PROCEDURE — 7100000010 HC PHASE II RECOVERY - FIRST 15 MIN: Performed by: SPECIALIST

## 2021-11-16 PROCEDURE — 3700000000 HC ANESTHESIA ATTENDED CARE: Performed by: SPECIALIST

## 2021-11-16 PROCEDURE — 3609027000 HC COLONOSCOPY: Performed by: SPECIALIST

## 2021-11-16 PROCEDURE — 2500000003 HC RX 250 WO HCPCS: Performed by: NURSE ANESTHETIST, CERTIFIED REGISTERED

## 2021-11-16 PROCEDURE — 2580000003 HC RX 258: Performed by: SPECIALIST

## 2021-11-16 PROCEDURE — 2580000003 HC RX 258

## 2021-11-16 PROCEDURE — 7100000011 HC PHASE II RECOVERY - ADDTL 15 MIN: Performed by: SPECIALIST

## 2021-11-16 PROCEDURE — 45385 COLONOSCOPY W/LESION REMOVAL: CPT | Performed by: SPECIALIST

## 2021-11-16 PROCEDURE — 2709999900 HC NON-CHARGEABLE SUPPLY: Performed by: SPECIALIST

## 2021-11-16 PROCEDURE — 45380 COLONOSCOPY AND BIOPSY: CPT | Performed by: SPECIALIST

## 2021-11-16 PROCEDURE — 6360000002 HC RX W HCPCS: Performed by: NURSE ANESTHETIST, CERTIFIED REGISTERED

## 2021-11-16 PROCEDURE — 88305 TISSUE EXAM BY PATHOLOGIST: CPT

## 2021-11-16 RX ORDER — SODIUM CHLORIDE 9 MG/ML
INJECTION, SOLUTION INTRAVENOUS CONTINUOUS
Status: DISCONTINUED | OUTPATIENT
Start: 2021-11-16 | End: 2021-11-16 | Stop reason: HOSPADM

## 2021-11-16 RX ORDER — LIDOCAINE HYDROCHLORIDE 20 MG/ML
INJECTION, SOLUTION EPIDURAL; INFILTRATION; INTRACAUDAL; PERINEURAL PRN
Status: DISCONTINUED | OUTPATIENT
Start: 2021-11-16 | End: 2021-11-16 | Stop reason: SDUPTHER

## 2021-11-16 RX ORDER — MAGNESIUM HYDROXIDE 1200 MG/15ML
LIQUID ORAL PRN
Status: DISCONTINUED | OUTPATIENT
Start: 2021-11-16 | End: 2021-11-16 | Stop reason: ALTCHOICE

## 2021-11-16 RX ORDER — PROPOFOL 10 MG/ML
INJECTION, EMULSION INTRAVENOUS PRN
Status: DISCONTINUED | OUTPATIENT
Start: 2021-11-16 | End: 2021-11-16 | Stop reason: SDUPTHER

## 2021-11-16 RX ORDER — SODIUM CHLORIDE 9 MG/ML
INJECTION, SOLUTION INTRAVENOUS
Status: COMPLETED
Start: 2021-11-16 | End: 2021-11-16

## 2021-11-16 RX ADMIN — PROPOFOL 50 MG: 10 INJECTION, EMULSION INTRAVENOUS at 11:25

## 2021-11-16 RX ADMIN — LIDOCAINE HYDROCHLORIDE 50 MG: 20 INJECTION, SOLUTION EPIDURAL; INFILTRATION; INTRACAUDAL; PERINEURAL at 11:22

## 2021-11-16 RX ADMIN — PROPOFOL 50 MG: 10 INJECTION, EMULSION INTRAVENOUS at 11:33

## 2021-11-16 RX ADMIN — SODIUM CHLORIDE: 9 INJECTION, SOLUTION INTRAVENOUS at 10:12

## 2021-11-16 RX ADMIN — PROPOFOL 50 MG: 10 INJECTION, EMULSION INTRAVENOUS at 11:30

## 2021-11-16 RX ADMIN — PROPOFOL 50 MG: 10 INJECTION, EMULSION INTRAVENOUS at 11:27

## 2021-11-16 RX ADMIN — PROPOFOL 100 MG: 10 INJECTION, EMULSION INTRAVENOUS at 11:22

## 2021-11-16 NOTE — ANESTHESIA PRE PROCEDURE
Department of Anesthesiology  Preprocedure Note       Name:  Karen Lopes   Age:  73 y.o.  :  1950                                          MRN:  [id number]         Date:  2021      Surgeon: Malini Choi):  Mary Barrientos MD    Procedure: Procedure(s):  COLORECTAL CANCER SCREENING, HIGH RISK    Medications prior to admission:   Prior to Admission medications    Medication Sig Start Date End Date Taking? Authorizing Provider   traZODone (DESYREL) 50 MG tablet Take 1 tablet by mouth nightly 10/6/21  Yes Hina Castle MD   metoprolol succinate (TOPROL XL) 50 MG extended release tablet TAKE 1 TABLET BY MOUTH EVERY DAY 4/15/19  Yes Marianna Mortimer, MD   lisinopril (PRINIVIL;ZESTRIL) 20 MG tablet Take 1 tablet by mouth 2 times daily. 14  Yes Marianna Mortimer, MD   atorvastatin (LIPITOR) 80 MG tablet Take 1 tablet by mouth daily. 14  Yes Marianna Mortimer, MD   aspirin 81 MG EC tablet Take 81 mg by mouth daily. Yes Historical Provider, MD   tadalafil (CIALIS) 10 MG tablet Take 1 tablet by mouth as needed for Erectile Dysfunction Don't fill until 10/20/2021.  Max 1 tablet po once daily 10/20/21   Hina Castle MD   nitroGLYCERIN (NITROSTAT) 0.4 MG SL tablet DISSOLVE 1 TABLET UNDER THE TONGUE AS NEEDED FOR CHEST PAIN- MAY REPEAT EVERY 5 MINUTES IF NEEDED ( MAX 3 DOSES.- IF NO RELIEF CALL 911) 20   Historical Provider, MD       Current medications:    Current Facility-Administered Medications   Medication Dose Route Frequency Provider Last Rate Last Admin    Simethicone SUSP    PRN Mary Barrientos MD   60 mg at 21 1130    sterile water for irrigation    PRN Mary Barrientos MD   1,000 mL at 21 1130    0.9 % sodium chloride infusion   IntraVENous Continuous Mary Barrientos  mL/hr at 21 1012 New Bag at 21 1012       Allergies:  No Known Allergies    Problem List:    Patient Active Problem List   Diagnosis Code    Anxiety F41.9    Hyperlipidemia E78.5    Sleep apnea G47.30    CAD (coronary artery disease) of artery bypass graft I25.810    Keratosis L57.0    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 G56.00    Spinal stenosis of lumbar region with neurogenic claudication M48.062    Herniated nucleus pulposus, lumbar M51.26    Smoker F17.200    Lumbar stenosis with neurogenic claudication M48.062    Spinal stenosis, lumbar region with neurogenic claudication M48.062       Past Medical History:        Diagnosis Date    CAD (coronary artery disease)     had CABG    HIGH CHOLESTEROL     Hypercholesterolemia     Hyperlipidemia     Hypertension     Keratosis     Neuropathy     Psoriasis     Sleep apnea        Past Surgical History:        Procedure Laterality Date    CARDIAC SURGERY      CARPAL TUNNEL RELEASE Left     COLONOSCOPY  9-10-12    dr Mile Torres /2 polyps, diverticulosis, int hemmorrhoids    CORONARY ARTERY BYPASS GRAFT  12    CABG X 3    LAMINECTOMY Bilateral 2021    BILATERAL L 3-4 MICRODECOMPRESSION performed by Chase Lima MD at [address] [place] [address] N/CARPAL TUNNEL SURG Right 10/30/2018    RIGHT WRIST CARPAL TUNNEL RELEASE SUPINE performed by Bruce Nolan MD at [facility]       Social History:    Social History     Tobacco Use    Smoking status: Light Tobacco Smoker     Packs/day: 0.50     Years: 20.00     Pack years: 10.00     Types: Cigarettes     Last attempt to quit: 2012     Years since quittin.8    Smokeless tobacco: Never Used    Tobacco comment: mentions only when he is drinking    Substance Use Topics    Alcohol use: Yes     Comment: social                                 Ready to quit: Not Answered  Counseling given: Not Answered  Comment: mentions only when he is drinking       Vital Signs (Current):   Vitals:    21 1002   BP: (!) 195/91 Resp: 18   Temp: 36.3 °C (97.4 °F)   TempSrc: Temporal   SpO2: 97%   Weight: 198 lb (89.8 kg)   Height: 5' 7\" (1.702 m)                                              BP Readings from Last 3 Encounters:   11/16/21 (!) 195/91   11/16/21 128/60   10/06/21 130/77       NPO Status: Time of last liquid consumption: 0730                        Time of last solid consumption: 1800                        Date of last liquid consumption: 11/16/21                        Date of last solid food consumption: 11/14/21    BMI:   Wt Readings from Last 3 Encounters:   11/16/21 198 lb (89.8 kg)   10/06/21 200 lb (90.7 kg)   06/10/21 199 lb (90.3 kg)     Body mass index is 31.01 kg/m². CBC:   Lab Results   Component Value Date    WBC 9.6 07/21/2021    RBC 4.71 07/21/2021    RBC 4.22 01/31/2012    HGB 14.9 07/21/2021    HCT 44.7 07/21/2021    MCV 94.9 07/21/2021    RDW 12.8 07/21/2021     07/21/2021       CMP:   Lab Results   Component Value Date     07/21/2021    K 4.4 07/21/2021     07/21/2021    CO2 22 07/21/2021    BUN 12 07/21/2021    CREATININE 0.81 07/21/2021    GFRAA >60.0 07/21/2021    LABGLOM >60.0 07/21/2021    GLUCOSE 105 07/21/2021    GLUCOSE 96 01/14/2012    PROT 6.9 07/21/2021    CALCIUM 9.4 07/21/2021    BILITOT 0.4 07/21/2021    ALKPHOS 84 07/21/2021    AST 26 07/21/2021    ALT 24 07/21/2021       POC Tests: No results for input(s): POCGLU, POCNA, POCK, POCCL, POCBUN, POCHEMO, POCHCT in the last 72 hours.     Coags:   Lab Results   Component Value Date    PROTIME 13.5 04/30/2021    PROTIME 11.2 01/10/2012    INR 1.0 04/30/2021    APTT 25.9 04/30/2021       HCG (If Applicable): No results found for: PREGTESTUR, PREGSERUM, HCG, HCGQUANT     ABGs: No results found for: PHART, PO2ART, QWH7EXL, XRK7YSV, BEART, H1FLHCOK     Type & Screen (If Applicable):  No results found for: LABABO, LABRH    Drug/Infectious Status (If Applicable):  No results found for: HIV, HEPCAB    COVID-19 Screening (If Applicable):   Lab Results   Component Value Date    COVID19 Not Detected 04/30/2021           Anesthesia Evaluation  Patient summary reviewed and Nursing notes reviewed no history of anesthetic complications:   Airway: Mallampati: II  TM distance: >3 FB   Neck ROM: full  Mouth opening: > = 3 FB Dental: normal exam         Pulmonary:Negative Pulmonary ROS and normal exam    (+) sleep apnea:                             Cardiovascular:Negative CV ROS  Exercise tolerance: good (>4 METS),   (+) hypertension: no interval change, CAD: no interval change,       ECG reviewed               Beta Blocker:  Not on Beta Blocker         Neuro/Psych:   Negative Neuro/Psych ROS              GI/Hepatic/Renal: Neg GI/Hepatic/Renal ROS  (+) bowel prep,           Endo/Other:              Pt had PAT visit. Abdominal:             Vascular: negative vascular ROS. Other Findings:             Anesthesia Plan      MAC     ASA 3       Induction: intravenous. Anesthetic plan and risks discussed with patient. Use of blood products discussed with patient whom consented to blood products.    Plan discussed with surgical team.                  SWAPNA Berrios - CRNA   11/16/2021

## 2021-11-16 NOTE — H&P
Patient Name: Theresa Vaughn  : 1950  MRN: [id number]  DATE: 21      ENDOSCOPY  History and Physical    Procedure:    [] Diagnostic Colonoscopy       [x] Screening Colonoscopy  [] EGD      [] ERCP      [] EUS       [] Other    [x] Previous office notes/History and Physical reviewed from the patients chart. Please see EMR for further details of HPI. I have examined the patient's status immediately prior to the procedure and:      Indications/HPI:    []Abdominal Pain  []Cancer- GI/Lung  []Fhx of colon CA/polyps  []History of Polyps  []Riveras   []Melena  []Abnormal Imaging  []Dysphagia    []Persistent Pneumonia  []Anemia  []Food Impaction  [x]History of Polyps  []GI Bleed  []Pulmonary nodule/Mass  []Change in bowel habits []Heartburn/Reflux  []Rectal Bleed (BRBPR)  []Chest Pain - Non Cardiac []Heme (+) Stoo  l[]Ulcers  []Constipation  []Hemoptysis   []Varices  []Diarrhea  []Hypoxemia  []Nausea/Vomiting  []Screening   []Crohns/Colitis  []Other:   Anesthesia:   [x] MAC [] Moderate Sedation   [] General   [] None     ROS: 12 pt Review of Symptoms was negative unless mentioned above    Medications:   Prior to Admission medications    Medication Sig Start Date End Date Taking? Authorizing Provider   traZODone (DESYREL) 50 MG tablet Take 1 tablet by mouth nightly 10/6/21  Yes Fareed Bowie MD   metoprolol succinate (TOPROL XL) 50 MG extended release tablet TAKE 1 TABLET BY MOUTH EVERY DAY 4/15/19  Yes Vini Aragon MD   lisinopril (PRINIVIL;ZESTRIL) 20 MG tablet Take 1 tablet by mouth 2 times daily. 14  Yes Vini Aragon MD   atorvastatin (LIPITOR) 80 MG tablet Take 1 tablet by mouth daily. 14  Yes Vini Aragon MD   aspirin 81 MG EC tablet Take 81 mg by mouth daily. Yes Historical Provider, MD   tadalafil (CIALIS) 10 MG tablet Take 1 tablet by mouth as needed for Erectile Dysfunction Don't fill until 10/20/2021.  Max 1 tablet po once daily 10/20/21   Fareed Bowie MD   nitroGLYCERIN (NITROSTAT) 0.4 MG SL tablet DISSOLVE 1 TABLET UNDER THE TONGUE AS NEEDED FOR CHEST PAIN- MAY REPEAT EVERY 5 MINUTES IF NEEDED ( MAX 3 DOSES.- IF NO RELIEF CALL 911) 20   Historical Provider, MD       Allergies: No Known Allergies     History of allergic reaction to anesthesia:  No    Past Medical History:  Past Medical History:   Diagnosis Date    CAD (coronary artery disease) 2012    had CABG    HIGH CHOLESTEROL     Hypercholesterolemia     Hyperlipidemia     Hypertension     Keratosis     Neuropathy     Psoriasis     Sleep apnea        Past Surgical History:  Past Surgical History:   Procedure Laterality Date    CARDIAC SURGERY      CARPAL TUNNEL RELEASE Left     COLONOSCOPY  9-10-12    dr Darral Burkitt /2 polyps, diverticulosis, int hemmorrhoids    CORONARY ARTERY BYPASS GRAFT  12    CABG X 3    LAMINECTOMY Bilateral 2021    BILATERAL L 3-4 MICRODECOMPRESSION performed by Zoie Santos MD at [address] [place] [address] N/CARPAL TUNNEL SURG Right 10/30/2018    RIGHT WRIST CARPAL TUNNEL RELEASE SUPINE performed by Tam Giron MD at [facility]       Social History:  Social History     Tobacco Use    Smoking status: Light Tobacco Smoker     Packs/day: 0.50     Years: 20.00     Pack years: 10.00     Types: Cigarettes     Last attempt to quit: 2012     Years since quittin.8    Smokeless tobacco: Never Used    Tobacco comment: mentions only when he is drinking    Vaping Use    Vaping Use: Never used   Substance Use Topics    Alcohol use: Yes     Comment: social     Drug use: No       Vital Signs:   Vitals:    21 1002   BP: (!) 195/91   Resp: 18   Temp: 97.4 °F (36.3 °C)   SpO2: 97%        Physical Exam:  Cardiac:  [x]WNL  []Comments:  Pulmonary:  [x]WNL   []Comments:   Neuro/Mental Status:  [x]WNL  []Comments:  Abdominal:  [x]WNL    []Comments:  Other:   []WNL  []Comments:    Informed Consent:  The risks and benefits of the procedure have been discussed with either the patient or if they cannot consent, their representative. Assessment:  Patient examined and appropriate for planned sedation and procedure. Plan:  Proceed with planned sedation and procedure as above.     Farheen Arthur MD  11:19 AM

## 2021-11-16 NOTE — ANESTHESIA PRE PROCEDURE
Department of Anesthesiology  Preprocedure Note       Name:  Thiago Snell   Age:  73 y.o.  :  1950                                          MRN:  [id number]         Date:  2021      Surgeon: Chente Arteaga):  Marsha Snyder MD    Procedure: Procedure(s):  COLORECTAL CANCER SCREENING, HIGH RISK    Medications prior to admission:   Prior to Admission medications    Medication Sig Start Date End Date Taking? Authorizing Provider   traZODone (DESYREL) 50 MG tablet Take 1 tablet by mouth nightly 10/6/21  Yes Cooper Gamble MD   metoprolol succinate (TOPROL XL) 50 MG extended release tablet TAKE 1 TABLET BY MOUTH EVERY DAY 4/15/19  Yes Yumiko Beck MD   lisinopril (PRINIVIL;ZESTRIL) 20 MG tablet Take 1 tablet by mouth 2 times daily. 14  Yes Yumiko Beck MD   atorvastatin (LIPITOR) 80 MG tablet Take 1 tablet by mouth daily. 14  Yes Yumiko Beck MD   aspirin 81 MG EC tablet Take 81 mg by mouth daily. Yes Historical Provider, MD   tadalafil (CIALIS) 10 MG tablet Take 1 tablet by mouth as needed for Erectile Dysfunction Don't fill until 10/20/2021.  Max 1 tablet po once daily 10/20/21   Cooper Gamble MD   nitroGLYCERIN (NITROSTAT) 0.4 MG SL tablet DISSOLVE 1 TABLET UNDER THE TONGUE AS NEEDED FOR CHEST PAIN- MAY REPEAT EVERY 5 MINUTES IF NEEDED ( MAX 3 DOSES.- IF NO RELIEF CALL 911) 20   Historical Provider, MD       Current medications:    Current Facility-Administered Medications   Medication Dose Route Frequency Provider Last Rate Last Admin    Simethicone SUSP    PRN Marsha Snyder MD   60 mg at 21 0933    sterile water for irrigation    PRN Marsha Snyder MD   1,000 mL at 21 0934    0.9 % sodium chloride infusion   IntraVENous Continuous Marsha Snyder  mL/hr at 21 1012 New Bag at 21 1012       Allergies:  No Known Allergies    Problem List:    Patient Active Problem List   Diagnosis Code    Anxiety F41.9    Hyperlipidemia E78.5    Sleep apnea G47.30    CAD (coronary artery disease) of artery bypass graft I25.810    Keratosis L57.0    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 G56.00    Spinal stenosis of lumbar region with neurogenic claudication M48.062    Herniated nucleus pulposus, lumbar M51.26    Smoker F17.200    Lumbar stenosis with neurogenic claudication M48.062    Spinal stenosis, lumbar region with neurogenic claudication M48.062       Past Medical History:        Diagnosis Date    CAD (coronary artery disease)     had CABG    HIGH CHOLESTEROL     Hypercholesterolemia     Hyperlipidemia     Hypertension     Keratosis     Neuropathy     Psoriasis     Sleep apnea        Past Surgical History:        Procedure Laterality Date    CARDIAC SURGERY      CARPAL TUNNEL RELEASE Left     COLONOSCOPY  9-10-12    dr Yahaira Simons /2 polyps, diverticulosis, int hemmorrhoids    CORONARY ARTERY BYPASS GRAFT  12    CABG X 3    LAMINECTOMY Bilateral 2021    BILATERAL L 3-4 MICRODECOMPRESSION performed by Derrell Maurer MD at [facility] N/CARPAL TUNNEL SURG Right 10/30/2018    RIGHT WRIST CARPAL TUNNEL RELEASE SUPINE performed by Anish Bolton MD at [facility] [address]       Social History:    Social History     Tobacco Use    Smoking status: Light Tobacco Smoker     Packs/day: 0.50     Years: 20.00     Pack years: 10.00     Types: Cigarettes     Last attempt to quit: 2012     Years since quittin.8    Smokeless tobacco: Never Used    Tobacco comment: mentions only when he is drinking    Substance Use Topics    Alcohol use: Yes     Comment: social                                 Ready to quit: Not Answered  Counseling given: Not Answered  Comment: mentions only when he is drinking       Vital Signs (Current):   Vitals:    21 1002   BP: (!) 195/91 Resp: 18   Temp: 36.3 °C (97.4 °F)   TempSrc: Temporal   SpO2: 97%   Weight: 198 lb (89.8 kg)   Height: 5' 7\" (1.702 m)                                              BP Readings from Last 3 Encounters:   11/16/21 (!) 195/91   10/06/21 130/77   06/10/21 136/74       NPO Status: Time of last liquid consumption: 0730                        Time of last solid consumption: 1800                        Date of last liquid consumption: 11/16/21                        Date of last solid food consumption: 11/14/21    BMI:   Wt Readings from Last 3 Encounters:   11/16/21 198 lb (89.8 kg)   10/06/21 200 lb (90.7 kg)   06/10/21 199 lb (90.3 kg)     Body mass index is 31.01 kg/m². CBC:   Lab Results   Component Value Date    WBC 9.6 07/21/2021    RBC 4.71 07/21/2021    RBC 4.22 01/31/2012    HGB 14.9 07/21/2021    HCT 44.7 07/21/2021    MCV 94.9 07/21/2021    RDW 12.8 07/21/2021     07/21/2021       CMP:   Lab Results   Component Value Date     07/21/2021    K 4.4 07/21/2021     07/21/2021    CO2 22 07/21/2021    BUN 12 07/21/2021    CREATININE 0.81 07/21/2021    GFRAA >60.0 07/21/2021    LABGLOM >60.0 07/21/2021    GLUCOSE 105 07/21/2021    GLUCOSE 96 01/14/2012    PROT 6.9 07/21/2021    CALCIUM 9.4 07/21/2021    BILITOT 0.4 07/21/2021    ALKPHOS 84 07/21/2021    AST 26 07/21/2021    ALT 24 07/21/2021       POC Tests: No results for input(s): POCGLU, POCNA, POCK, POCCL, POCBUN, POCHEMO, POCHCT in the last 72 hours.     Coags:   Lab Results   Component Value Date    PROTIME 13.5 04/30/2021    PROTIME 11.2 01/10/2012    INR 1.0 04/30/2021    APTT 25.9 04/30/2021       HCG (If Applicable): No results found for: PREGTESTUR, PREGSERUM, HCG, HCGQUANT     ABGs: No results found for: PHART, PO2ART, EEH9FFM, SVQ0WOL, BEART, B7JMFKLX     Type & Screen (If Applicable):  No results found for: LABABO, LABRH    Drug/Infectious Status (If Applicable):  No results found for: HIV, HEPCAB    COVID-19 Screening (If Applicable):   Lab Results   Component Value Date    COVID19 Not Detected 04/30/2021           Anesthesia Evaluation    Airway:         Dental:          Pulmonary:   (+) sleep apnea:                             Cardiovascular:    (+) hypertension: no interval change, CAD: no interval change, CABG/stent: no interval change,       ECG reviewed               Beta Blocker:  Dose within 24 Hrs         Neuro/Psych:               GI/Hepatic/Renal:   (+) bowel prep,           Endo/Other:                     Abdominal:             Vascular: Other Findings:             Anesthesia Plan      MAC     ASA 3       Induction: intravenous. Anesthetic plan and risks discussed with patient. Use of blood products discussed with patient whom consented to blood products.    Plan discussed with surgical team.                  SWAPNA Berrios - CRNA   11/16/2021

## 2021-11-16 NOTE — ANESTHESIA POSTPROCEDURE EVALUATION
Department of Anesthesiology  Postprocedure Note    Patient: Krzysztof Hunter  MRN: [id number]  [place]: 1950  Date of evaluation: 11/16/2021  Time:  11:39 AM     Procedure Summary     Date: 11/16/21 Room / [facility]  [facility]    Anesthesia Start: 1117 Anesthesia Stop: 1139    Procedure: COLORECTAL CANCER SCREENING, HIGH RISK (N/A ) Diagnosis: (History of colon polyps Z86.010)    Surgeons: Jair Cm MD Responsible Provider: SWAPNA Shea CRNA    Anesthesia Type: MAC ASA Status: 3          Anesthesia Type: MAC    Marion Phase I:      Marion Phase II:      Last vitals: Reviewed and per EMR flowsheets.        Anesthesia Post Evaluation    Patient location during evaluation: bedside  Patient participation: complete - patient participated  Level of consciousness: awake and awake and alert  Airway patency: patent  Nausea & Vomiting: no nausea, no vomiting and vomiting  Complications: no  Cardiovascular status: blood pressure returned to baseline and hemodynamically stable  Respiratory status: acceptable  Hydration status: euvolemic

## 2022-01-04 ENCOUNTER — NURSE ONLY (OUTPATIENT)
Dept: PRIMARY CARE CLINIC | Age: 72
End: 2022-01-04

## 2022-01-04 ENCOUNTER — VIRTUAL VISIT (OUTPATIENT)
Dept: FAMILY MEDICINE CLINIC | Age: 72
End: 2022-01-04
Payer: MEDICARE

## 2022-01-04 DIAGNOSIS — Z20.822 ENCOUNTER FOR LABORATORY TESTING FOR COVID-19 VIRUS: Primary | ICD-10-CM

## 2022-01-04 LAB
Lab: NORMAL
PERFORMING INSTRUMENT: NORMAL
QC PASS/FAIL: NORMAL
SARS-COV-2, POC: NORMAL

## 2022-01-04 PROCEDURE — 99441 PR PHYS/QHP TELEPHONE EVALUATION 5-10 MIN: CPT | Performed by: NURSE PRACTITIONER

## 2022-01-04 PROCEDURE — 87426 SARSCOV CORONAVIRUS AG IA: CPT | Performed by: NURSE PRACTITIONER

## 2022-01-04 ASSESSMENT — ENCOUNTER SYMPTOMS
VOMITING: 0
RHINORRHEA: 0
NAUSEA: 0
WHEEZING: 0
COUGH: 0
SHORTNESS OF BREATH: 0
SORE THROAT: 0
TROUBLE SWALLOWING: 0
DIARRHEA: 0

## 2022-01-04 NOTE — PROGRESS NOTES
TELEHEALTH EVALUATION -- Audio/Visual (During NQMZP-28 public health emergency)    -   Jaylin Jaimes is a 73 y.o. male being evaluated by a Virtual Visit (video visit) encounter to address concerns as mentioned above. A caregiver was present when appropriate. Due to this being a TeleHealth encounter (During BNW-83 public health emergency), evaluation of the following organ systems was limited: Vitals/Constitutional/EENT/Resp/CV/GI//MS/Neuro/Skin/Heme-Lymph-Imm. Pursuant to the emergency declaration under the [facility], [address] [place] [address] authority and the [employer] Act, this Virtual Visit was conducted with patient's (and/or legal guardian's) consent, to reduce the patient's risk of exposure to COVID-19 and provide necessary medical care. The patient (and/or legal guardian) has also been advised to contact this office for worsening conditions or problems, and seek emergency medical treatment and/or call 911 if deemed necessary. Patient was contacted and agreed to proceed with a virtual visit via Telephone Visit  The risks and benefits of converting to a virtual visit were discussed in light of the current infectious disease epidemic. Patient also understood that insurance coverage and co-pays are up to their individual insurance plans. Patient was located at their home. Provider was located at their office. 2022  Jaylin Jaimes (:  1950) has requested an audio/video evaluation for the following concern(s):    HPI      He is not sick  Becausee they are together   His wife is sick and shes been sick for about 10 days   He is vaccinated for covid       Review of Systems   Constitutional: Negative for chills, fatigue and fever. HENT: Negative for congestion, rhinorrhea, sore throat and trouble swallowing.     Respiratory: Negative for cough, shortness of breath and wheezing. Gastrointestinal: Negative for diarrhea, nausea and vomiting. Musculoskeletal: Negative for myalgias. Skin: Negative for rash. Neurological: Negative for dizziness, light-headedness and headaches. Psychiatric/Behavioral: Negative for agitation, confusion and hallucinations. Prior to Visit Medications    Medication Sig Taking? Authorizing Provider   traZODone (DESYREL) 50 MG tablet Take 1 tablet by mouth nightly Yes Jonathan Chong MD   metoprolol succinate (TOPROL XL) 50 MG extended release tablet TAKE 1 TABLET BY MOUTH EVERY DAY Yes Marielle Parra MD   lisinopril (PRINIVIL;ZESTRIL) 20 MG tablet Take 1 tablet by mouth 2 times daily. Yes Marielle Parra MD   atorvastatin (LIPITOR) 80 MG tablet Take 1 tablet by mouth daily. Yes Marielle Parra MD   aspirin 81 MG EC tablet Take 81 mg by mouth daily. Yes Historical Provider, MD   tadalafil (CIALIS) 10 MG tablet Take 1 tablet by mouth as needed for Erectile Dysfunction Don't fill until 10/20/2021.  Max 1 tablet po once daily  Jonathan Chong MD   nitroGLYCERIN (NITROSTAT) 0.4 MG SL tablet DISSOLVE 1 TABLET UNDER THE TONGUE AS NEEDED FOR CHEST PAIN- MAY REPEAT EVERY 5 MINUTES IF NEEDED ( MAX 3 DOSES.- IF NO RELIEF CALL 911)  Historical Provider, MD       Past Medical History:   Diagnosis Date    CAD (coronary artery disease) 2012    had CABG    HIGH CHOLESTEROL     Hypercholesterolemia     Hyperlipidemia     Hypertension     Keratosis     Neuropathy     Psoriasis     Sleep apnea      Past Surgical History:   Procedure Laterality Date    CARDIAC SURGERY      CARPAL TUNNEL RELEASE Left     COLONOSCOPY  9-10-12    dr Julio Ramirez /2 polyps, diverticulosis, int hemmorrhoids    COLONOSCOPY N/A 11/16/2021    COLORECTAL CANCER SCREENING, HIGH RISK performed by Sol Seymour MD at [address] [place] [address] Rd  1/9/12    CABG X 3    LAMINECTOMY Bilateral 5/5/2021    BILATERAL L 3-4 MICRODECOMPRESSION performed by Felicitas Cruz MD at [address] [place] [address] N/CARPAL TUNNEL SURG Right 10/30/2018    RIGHT WRIST CARPAL TUNNEL RELEASE SUPINE performed by Jacobo Carroll MD at [facility] History     Socioeconomic History    Marital status:      Spouse name: Not on file    Number of children: Not on file    Years of education: Not on file    Highest education level: Not on file   Occupational History    Not on file   Tobacco Use    Smoking status: Light Tobacco Smoker     Packs/day: 0.50     Years: 20.00     Pack years: 10.00     Types: Cigarettes     Last attempt to quit: 1/6/2012     Years since quitting: 10.0    Smokeless tobacco: Never Used    Tobacco comment: mentions only when he is drinking    Vaping Use    Vaping Use: Never used   Substance and Sexual Activity    Alcohol use: Yes     Comment: social     Drug use: No    Sexual activity: Not Currently   Other Topics Concern    Not on file   Social History Narrative    Not on file     Social Determinants of Health     Financial Resource Strain: Low Risk     Difficulty of Paying Living Expenses: Not hard at all   Food Insecurity: No Food Insecurity    Worried About Running Out of Food in the Last Year: Never true    [place] of Food in the Last Year: Never true   Transportation Needs: No Transportation Needs    Lack of Transportation (Medical): No    Lack of Transportation (Non-Medical):  No   Physical Activity:     Days of Exercise per Week: Not on file    Minutes of Exercise per Session: Not on file   Stress:     Feeling of Stress : Not on file   Social Connections:     Frequency of Communication with Friends and Family: Not on file    Frequency of Social Gatherings with Friends and Family: Not on file    Attends Confucianism Services: Not on file    Active Member of Clubs or Organizations: Not on file    Attends Club or Organization Meetings: Not on file    Marital Status: Not on file Intimate Partner Violence:     Fear of Current or Ex-Partner: Not on file    Emotionally Abused: Not on file    Physically Abused: Not on file    Sexually Abused: Not on file   Housing Stability:     Unable to Pay for Housing in the Last Year: Not on file    Number of Places Lived in the Last Year: Not on file    Unstable Housing in the Last Year: Not on file     Family History   Problem Relation Age of Onset    Coronary Art Dis Mother     Heart Failure Mother     Heart Disease Mother     Heart Surgery Sister     Cancer Sister         breast    Heart Disease Sister     Heart Disease Father     Heart Disease Brother     Colon Cancer Neg Hx      No Known Allergies    PMH, Surgical Hx, Family Hx, and Social Hx reviewed and updated. Health Maintenance reviewed. PHYSICAL EXAMINATION:  \"[x]\" Indicates a positive item  \"[]\" Indicates a negative item    Vital Signs: (As obtained by patient/caregiver or practitioner observation)    Blood pressure-  Heart rate-    Respiratory rate-    Temperature-  Pulse oximetry-     Constitutional: [] Appears well-developed and well-nourished [x] No apparent distress      [] Abnormal-   Mental status  [x] Alert and awake  [x] Oriented to person/place/time [x]Able to follow commands      Eyes:  EOM    []  Normal  [] Abnormal-  Sclera  []  Normal  [] Abnormal -         Discharge []  None visible  [] Abnormal -    HENT:   [] Normocephalic, atraumatic.   [] Abnormal   [] Mouth/Throat: Mucous membranes are moist.     External Ears [] Normal  [] Abnormal-     Neck: [x] No visualized mass     Pulmonary/Chest: [x] Respiratory effort normal.  [x] No heard signs of difficulty breathing or respiratory distress        [] Abnormal-      Musculoskeletal:   [] Normal gait with no signs of ataxia         [] Normal range of motion of neck        [] Abnormal-       Neurological:       [] No Facial Asymmetry (Cranial nerve 7 motor function) (limited exam to video visit)          [] No gaze palsy        [] Abnormal-         Skin:        [] No significant exanthematous lesions or discoloration noted on facial skin         [] Abnormal-            Psychiatric:       [x] Normal Affect [x] No Hallucinations        [] Abnormal-     Other pertinent observable physical exam findings-   Results for orders placed or performed in visit on 07/21/21   Hemoglobin A1C   Result Value Ref Range    Hemoglobin A1C 5.1 4.8 - 5.9 %       ASSESSMENT/PLAN:  Assessment & Plan   Lopez Yanes was seen today for covid testing. Diagnoses and all orders for this visit:    Encounter for laboratory testing for COVID-19 virus  -     POCT COVID-19, Antigen      Orders Placed This Encounter   Procedures    POCT COVID-19, Antigen     Order Specific Question:   Is this test for diagnosis or screening? Answer:   Screening     Order Specific Question:   Symptomatic for COVID-19 as defined by CDC? Answer:   No     Order Specific Question:   Date of Symptom Onset     Answer:   N/A     Order Specific Question:   Hospitalized for COVID-19? Answer:   No     Order Specific Question:   Admitted to ICU for COVID-19? Answer:   No     Order Specific Question:   Employed in healthcare setting? Answer:   Unknown     Order Specific Question:   Resident in a congregate (group) care setting? Answer:   Unknown     Order Specific Question:   Pregnant: Answer:   No     Order Specific Question:   Previously tested for COVID-19? Answer:   Yes     No orders of the defined types were placed in this encounter. There are no discontinued medications. Return if symptoms worsen or fail to improve. Reviewed with the patient: current clinical status, medications, activities and diet. Side effects, adverse effects of the medication prescribed today, as well as treatment plan/ rationale and result expectations have been discussed with the patient who expresses understanding and desires to proceed.     Close follow up to evaluate treatment results and for coordination of care. I have reviewed the patient's medical history in detail and updated the computerized patient record. Patient identification was verified at the start of the visit: Yes    Total time spent on this encounter: About 5 mins       --SWAPNA Short CNP on 1/4/2022 at 12:03 PM    An electronic signature was used to authenticate this note.

## 2022-08-08 ENCOUNTER — TELEPHONE (OUTPATIENT)
Dept: PRIMARY CARE CLINIC | Age: 72
End: 2022-08-08

## 2022-08-09 ENCOUNTER — HOSPITAL ENCOUNTER (OUTPATIENT)
Dept: LAB | Age: 72
Discharge: HOME | End: 2022-08-09
Payer: MEDICARE

## 2022-08-09 LAB
ALBUMIN SERPL-MCNC: 4.2 G/DL (ref 3.5–4.6)
ALP BLD-CCNC: 73 U/L (ref 35–104)
ALT SERPL-CCNC: 24 U/L (ref 0–41)
ANION GAP SERPL CALCULATED.3IONS-SCNC: 12 MEQ/L (ref 9–15)
AST SERPL-CCNC: 22 U/L (ref 0–40)
BILIRUB SERPL-MCNC: 0.4 MG/DL (ref 0.2–0.7)
BILIRUBIN DIRECT: <0.2 MG/DL (ref 0–0.4)
BILIRUBIN, INDIRECT: NORMAL MG/DL (ref 0–0.6)
BUN BLDV-MCNC: 11 MG/DL (ref 8–23)
CALCIUM SERPL-MCNC: 9.2 MG/DL (ref 8.5–9.9)
CHLORIDE BLD-SCNC: 103 MEQ/L (ref 95–107)
CHOLESTEROL, TOTAL: 112 MG/DL (ref 0–199)
CO2: 23 MEQ/L (ref 20–31)
CREAT SERPL-MCNC: 0.82 MG/DL (ref 0.7–1.2)
GFR AFRICAN AMERICAN: >60
GFR NON-AFRICAN AMERICAN: >60
GLUCOSE BLD-MCNC: 107 MG/DL (ref 70–99)
HCT VFR BLD CALC: 43.2 % (ref 42–52)
HDLC SERPL-MCNC: 36 MG/DL (ref 40–59)
HEMOGLOBIN: 14.5 G/DL (ref 14–18)
LDL CHOLESTEROL CALCULATED: 65 MG/DL (ref 0–129)
MAGNESIUM: 2 MG/DL (ref 1.7–2.4)
MCH RBC QN AUTO: 31.7 PG (ref 27–31.3)
MCHC RBC AUTO-ENTMCNC: 33.5 % (ref 33–37)
MCV RBC AUTO: 94.6 FL (ref 80–100)
PDW BLD-RTO: 12.7 % (ref 11.5–14.5)
PLATELET # BLD: 347 K/UL (ref 130–400)
POTASSIUM SERPL-SCNC: 4.7 MEQ/L (ref 3.4–4.9)
RBC # BLD: 4.56 M/UL (ref 4.7–6.1)
SODIUM BLD-SCNC: 138 MEQ/L (ref 135–144)
TOTAL PROTEIN: 6.8 G/DL (ref 6.3–8)
TRIGL SERPL-MCNC: 53 MG/DL (ref 0–150)
TSH SERPL DL<=0.05 MIU/L-ACNC: 1.86 UIU/ML (ref 0.44–3.86)
WBC # BLD: 9.9 K/UL (ref 4.8–10.8)

## 2022-08-09 PROCEDURE — 36415 COLL VENOUS BLD VENIPUNCTURE: CPT

## 2022-08-09 PROCEDURE — 85027 COMPLETE CBC AUTOMATED: CPT

## 2022-08-09 PROCEDURE — 80048 BASIC METABOLIC PNL TOTAL CA: CPT

## 2022-08-09 PROCEDURE — 80076 HEPATIC FUNCTION PANEL: CPT

## 2022-08-09 PROCEDURE — 83735 ASSAY OF MAGNESIUM: CPT

## 2022-08-09 PROCEDURE — 80061 LIPID PANEL: CPT

## 2022-08-09 PROCEDURE — 84443 ASSAY THYROID STIM HORMONE: CPT

## 2022-09-29 ENCOUNTER — TELEMEDICINE (OUTPATIENT)
Dept: PRIMARY CARE CLINIC | Age: 72
End: 2022-09-29
Payer: MEDICARE

## 2022-09-29 ENCOUNTER — TELEPHONE (OUTPATIENT)
Dept: PRIMARY CARE CLINIC | Age: 72
End: 2022-09-29

## 2022-09-29 DIAGNOSIS — Z00.00 MEDICARE ANNUAL WELLNESS VISIT, SUBSEQUENT: Primary | ICD-10-CM

## 2022-09-29 PROCEDURE — 3017F COLORECTAL CA SCREEN DOC REV: CPT | Performed by: NURSE PRACTITIONER

## 2022-09-29 PROCEDURE — G0439 PPPS, SUBSEQ VISIT: HCPCS | Performed by: NURSE PRACTITIONER

## 2022-09-29 PROCEDURE — 1123F ACP DISCUSS/DSCN MKR DOCD: CPT | Performed by: NURSE PRACTITIONER

## 2022-09-29 ASSESSMENT — PATIENT HEALTH QUESTIONNAIRE - PHQ9
SUM OF ALL RESPONSES TO PHQ QUESTIONS 1-9: 0
SUM OF ALL RESPONSES TO PHQ9 QUESTIONS 1 & 2: 0
1. LITTLE INTEREST OR PLEASURE IN DOING THINGS: 0
SUM OF ALL RESPONSES TO PHQ QUESTIONS 1-9: 0
2. FEELING DOWN, DEPRESSED OR HOPELESS: 0

## 2022-09-29 ASSESSMENT — LIFESTYLE VARIABLES
HOW OFTEN DO YOU HAVE A DRINK CONTAINING ALCOHOL: 2-3 TIMES A WEEK
HOW MANY STANDARD DRINKS CONTAINING ALCOHOL DO YOU HAVE ON A TYPICAL DAY: 1 OR 2

## 2022-09-29 NOTE — TELEPHONE ENCOUNTER
Called and left pt a  msg to call [facility] office back @ #[phone] to complete his AWV over phone.

## 2022-09-29 NOTE — PATIENT INSTRUCTIONS
Personalized Preventive Plan for [facility] - 9/29/2022  Medicare offers a range of preventive health benefits. Some of the tests and screenings are paid in full while other may be subject to a deductible, co-insurance, and/or copay. Some of these benefits include a comprehensive review of your medical history including lifestyle, illnesses that may run in your family, and various assessments and screenings as appropriate. After reviewing your medical record and screening and assessments performed today your provider may have ordered immunizations, labs, imaging, and/or referrals for you. A list of these orders (if applicable) as well as your Preventive Care list are included within your After Visit Summary for your review. Other Preventive Recommendations:    A preventive eye exam performed by an eye specialist is recommended every 1-2 years to screen for glaucoma; cataracts, macular degeneration, and other eye disorders. A preventive dental visit is recommended every 6 months. Try to get at least 150 minutes of exercise per week or 10,000 steps per day on a pedometer . Order or download the FREE \"Exercise & Physical Activity: Your Everyday Guide\" from [employer]. Call 2-708.406.5952 or search [employer] online. You need 3456-1024 mg of calcium and 7719-6738 IU of vitamin D per day. It is possible to meet your calcium requirement with diet alone, but a vitamin D supplement is usually necessary to meet this goal.  When exposed to the sun, use a sunscreen that protects against both UVA and UVB radiation with an SPF of 30 or greater. Reapply every 2 to 3 hours or after sweating, drying off with a towel, or swimming. Always wear a seat belt when traveling in a car. Always wear a helmet when riding a bicycle or motorcycle.

## 2022-09-29 NOTE — TELEPHONE ENCOUNTER
Pt had asked during his AWV for Trazadone refill. I advised him that I will call the [facility] office and see and the MA I spoke to advised me pt will need to call and make an appt as he has not been seen since last Oct. I called and advised pt to call the office and see a different provider or schedule an appt so he can get this medication. Pt aware and was given the [facility] office # today, #[phone] to schedule an appt. Pt verbalized understanding.

## 2022-09-29 NOTE — PROGRESS NOTES
Medicare Annual Wellness Visit    Alondra Garcia is here for Medicare AWV (2022)    Assessment & Plan   Medicare annual wellness visit, subsequent    Recommendations for Preventive Services Due: see orders and patient instructions/AVS.  Recommended screening schedule for the next 5-10 years is provided to the patient in written form: see Patient Instructions/AVS.     Return for Medicare Annual Wellness Visit in 1 year. Subjective       Patient's complete Health Risk Assessment and screening values have been reviewed and are found in Flowsheets. The following problems were reviewed today and where indicated follow up appointments were made and/or referrals ordered. Positive Risk Factor Screenings with Interventions:       Tobacco Use:  Tobacco Use: High Risk    Smoking Tobacco Use: Light Smoker    Smokeless Tobacco Use: Never     E-cigarette/Vaping       Questions Responses    E-cigarette/Vaping Use Never User    Start Date     Passive Exposure     Quit Date     Counseling Given     Comments           Substance Use - Tobacco Interventions:  Pt aware            Health Habits/Nutrition:  Physical Activity: Sufficiently Active    Days of Exercise per Week: 7 days    Minutes of Exercise per Session: 30 min     Have you lost any weight without trying in the past 3 months?: No     Have you seen the dentist within the past year?: Appointment is scheduled  Health Habits/Nutrition Interventions:  No issues noted      Hearing/Vision:  Do you or your family notice any trouble with your hearing that hasn't been managed with hearing aids?: (!) Yes  Do you have difficulty driving, watching TV, or doing any of your daily activities because of your eyesight?: No  Have you had an eye exam within the past year?: Appointment is scheduled  No results found.   Hearing/Vision Interventions:  Hearing concerns:  patient declines any further evaluation/treatment for hearing issues            Objective      Patient-Reported Vitals  BP Observations: No, remote/electronic monitoring device was not used or able to be verified  Patient-Reported Weight: 198 lbs  Patient-Reported Height: 5ft 7            No Known Allergies  Prior to Visit Medications    Medication Sig Taking? Authorizing Provider   traZODone (DESYREL) 50 MG tablet Take 1 tablet by mouth nightly Yes Chema Portillo MD   metoprolol succinate (TOPROL XL) 50 MG extended release tablet TAKE 1 TABLET BY MOUTH EVERY DAY Yes Sondra Gong MD   lisinopril (PRINIVIL;ZESTRIL) 20 MG tablet Take 1 tablet by mouth 2 times daily. Yes Sondra Gong MD   atorvastatin (LIPITOR) 80 MG tablet Take 1 tablet by mouth daily. Yes Sondra Gong MD   tadalafil (CIALIS) 10 MG tablet Take 1 tablet by mouth as needed for Erectile Dysfunction Don't fill until 10/20/2021. Max 1 tablet po once daily  Patient not taking: Reported on 9/29/2022  Chema Portillo MD   nitroGLYCERIN (NITROSTAT) 0.4 MG SL tablet DISSOLVE 1 TABLET UNDER THE TONGUE AS NEEDED FOR CHEST PAIN- MAY REPEAT EVERY 5 MINUTES IF NEEDED ( MAX 3 DOSES.- IF NO RELIEF CALL 911)  Patient not taking: Reported on 9/29/2022  Historical Provider, MD   aspirin 81 MG EC tablet Take 81 mg by mouth daily. Historical Provider, MD Jon (Including outside providers/suppliers regularly involved in providing care):   Patient Care Team:  Chema Portillo MD as PCP - General (Internal Medicine)  Chema Portillo MD as PCP - [address] [place], [place] [address]led Provider  Ky Montes MD (Cardiology)  All Solomon MD as Surgeon (Cardiothoracic Surgery)     Reviewed and updated this visit:  Allergies  Meds           Cruz Kendrick, was evaluated through a synchronous (real-time) audio-video encounter. The patient (or guardian if applicable) is aware that this is a billable service, which includes applicable co-pays. This Virtual Visit was conducted with patient's (and/or legal guardian's) consent.  The visit was conducted pursuant to the emergency declaration under the [facility], [facility] waiver authority and the [employer] and [employer] General Act. Patient identification was verified, and a caregiver was present when appropriate. The patient was located at [facility] ([address] [place] [address]): [address] [place] [address]  [address] [facility],  [address] [facility] [place]. Provider was located at Home ([place] [address]): [place].

## 2022-10-03 ENCOUNTER — TELEMEDICINE (OUTPATIENT)
Dept: FAMILY MEDICINE CLINIC | Age: 72
End: 2022-10-03
Payer: MEDICARE

## 2022-10-03 DIAGNOSIS — G47.00 INSOMNIA, UNSPECIFIED TYPE: ICD-10-CM

## 2022-10-03 PROCEDURE — 1123F ACP DISCUSS/DSCN MKR DOCD: CPT | Performed by: PHYSICIAN ASSISTANT

## 2022-10-03 PROCEDURE — 99212 OFFICE O/P EST SF 10 MIN: CPT | Performed by: PHYSICIAN ASSISTANT

## 2022-10-03 PROCEDURE — 3017F COLORECTAL CA SCREEN DOC REV: CPT | Performed by: PHYSICIAN ASSISTANT

## 2022-10-03 PROCEDURE — G8427 DOCREV CUR MEDS BY ELIG CLIN: HCPCS | Performed by: PHYSICIAN ASSISTANT

## 2022-10-03 PROCEDURE — G8417 CALC BMI ABV UP PARAM F/U: HCPCS | Performed by: PHYSICIAN ASSISTANT

## 2022-10-03 PROCEDURE — G8484 FLU IMMUNIZE NO ADMIN: HCPCS | Performed by: PHYSICIAN ASSISTANT

## 2022-10-03 PROCEDURE — 4004F PT TOBACCO SCREEN RCVD TLK: CPT | Performed by: PHYSICIAN ASSISTANT

## 2022-10-03 RX ORDER — TRAZODONE HYDROCHLORIDE 50 MG/1
50 TABLET ORAL NIGHTLY
Qty: 90 TABLET | Refills: 2 | Status: SHIPPED | OUTPATIENT
Start: 2022-10-03

## 2022-10-03 SDOH — ECONOMIC STABILITY: FOOD INSECURITY: WITHIN THE PAST 12 MONTHS, YOU WORRIED THAT YOUR FOOD WOULD RUN OUT BEFORE YOU GOT MONEY TO BUY MORE.: NEVER TRUE

## 2022-10-03 SDOH — ECONOMIC STABILITY: FOOD INSECURITY: WITHIN THE PAST 12 MONTHS, THE FOOD YOU BOUGHT JUST DIDN'T LAST AND YOU DIDN'T HAVE MONEY TO GET MORE.: NEVER TRUE

## 2022-10-03 ASSESSMENT — SOCIAL DETERMINANTS OF HEALTH (SDOH): HOW HARD IS IT FOR YOU TO PAY FOR THE VERY BASICS LIKE FOOD, HOUSING, MEDICAL CARE, AND HEATING?: NOT HARD AT ALL

## 2022-10-03 NOTE — PROGRESS NOTES
Cassy Gutierrez (:  1950) is a Established patient, here for evaluation of the following:    Assessment & Plan   Below is the assessment and plan developed based on review of pertinent history, physical exam, labs, studies, and medications. 1. Insomnia, unspecified type  The following orders have not been finalized:  -     traZODone (DESYREL) 50 MG tablet    No follow-ups on file. Subjective   HPI  Telemedicine telephone visit due to concern for exposure to COVID-19 (coronavirus). Patient being seen acutely for prescription refill trazodone which he states works really well for insomnia  Review of Systems   Psychiatric/Behavioral:  Positive for sleep disturbance. All other systems reviewed and are negative. Objective   Patient-Reported Vitals  No data recorded     Physical Exam  Pulmonary:      Effort: Pulmonary effort is normal.   Neurological:      Mental Status: He is oriented to person, place, and time. Psychiatric:         Mood and Affect: Mood normal.            Assessment & Plan    Diagnosis Orders   1. Insomnia, unspecified type  traZODone (DESYREL) 50 MG tablet            No orders of the defined types were placed in this encounter. Orders Placed This Encounter   Medications    traZODone (DESYREL) 50 MG tablet     Sig: Take 1 tablet by mouth nightly     Dispense:  90 tablet     Refill:  2     Medications Discontinued During This Encounter   Medication Reason    traZODone (DESYREL) 50 MG tablet REORDER     Return in about 6 months (around 4/3/2023), or if symptoms worsen or fail to improve. Desiree Nielsen PA-C    On this date 10/3/2022 I have reviewed previous notes, test results and face to face (virtual) with the patient discussing the diagnosis and importance of compliance with the treatment plan as well as documenting on the day of the visit. Cassy Gutierrez, was evaluated through a synchronous (real-time) audio-video encounter.  The patient (or guardian if applicable) is aware that this is a billable service, which includes applicable co-pays. This Virtual Visit was conducted with patient's (and/or legal guardian's) consent. The visit was conducted pursuant to the emergency declaration under the [facility], [facility] authority and the [employer] and [employer] General Act. Patient identification was verified, and a caregiver was present when appropriate. The patient was located at Home: [place] [address]. Provider was located at [facility] (Appt Dept): [address] [place] [address].         --Sapna Parkinson MA

## 2023-03-29 ENCOUNTER — OFFICE VISIT (OUTPATIENT)
Dept: FAMILY MEDICINE CLINIC | Age: 73
End: 2023-03-29

## 2023-03-29 ENCOUNTER — TELEPHONE (OUTPATIENT)
Dept: FAMILY MEDICINE CLINIC | Age: 73
End: 2023-03-29

## 2023-03-29 VITALS
DIASTOLIC BLOOD PRESSURE: 80 MMHG | BODY MASS INDEX: 32.96 KG/M2 | SYSTOLIC BLOOD PRESSURE: 128 MMHG | WEIGHT: 210 LBS | OXYGEN SATURATION: 97 % | TEMPERATURE: 97.6 F | HEIGHT: 67 IN | HEART RATE: 81 BPM

## 2023-03-29 DIAGNOSIS — Z12.5 PROSTATE CANCER SCREENING: ICD-10-CM

## 2023-03-29 DIAGNOSIS — E78.2 MIXED HYPERLIPIDEMIA: ICD-10-CM

## 2023-03-29 DIAGNOSIS — I25.10 CORONARY ARTERY DISEASE INVOLVING NATIVE CORONARY ARTERY OF NATIVE HEART WITHOUT ANGINA PECTORIS: ICD-10-CM

## 2023-03-29 DIAGNOSIS — I10 PRIMARY HYPERTENSION: ICD-10-CM

## 2023-03-29 DIAGNOSIS — I10 PRIMARY HYPERTENSION: Primary | ICD-10-CM

## 2023-03-29 DIAGNOSIS — G47.00 INSOMNIA, UNSPECIFIED TYPE: ICD-10-CM

## 2023-03-29 LAB
ALBUMIN SERPL-MCNC: 4.5 G/DL (ref 3.5–4.6)
ALP SERPL-CCNC: 73 U/L (ref 35–104)
ALT SERPL-CCNC: 33 U/L (ref 0–41)
ANION GAP SERPL CALCULATED.3IONS-SCNC: 12 MEQ/L (ref 9–15)
AST SERPL-CCNC: 32 U/L (ref 0–40)
BASOPHILS # BLD: 0 K/UL (ref 0–0.2)
BASOPHILS NFR BLD: 0.5 %
BILIRUB SERPL-MCNC: 0.3 MG/DL (ref 0.2–0.7)
BUN SERPL-MCNC: 16 MG/DL (ref 8–23)
CALCIUM SERPL-MCNC: 9.5 MG/DL (ref 8.5–9.9)
CHLORIDE SERPL-SCNC: 105 MEQ/L (ref 95–107)
CHOLEST SERPL-MCNC: 128 MG/DL (ref 0–199)
CO2 SERPL-SCNC: 25 MEQ/L (ref 20–31)
CREAT SERPL-MCNC: 0.83 MG/DL (ref 0.7–1.2)
EOSINOPHIL # BLD: 0.3 K/UL (ref 0–0.7)
EOSINOPHIL NFR BLD: 3.3 %
ERYTHROCYTE [DISTWIDTH] IN BLOOD BY AUTOMATED COUNT: 13.1 % (ref 11.5–14.5)
GLOBULIN SER CALC-MCNC: 2.9 G/DL (ref 2.3–3.5)
GLUCOSE SERPL-MCNC: 105 MG/DL (ref 70–99)
HCT VFR BLD AUTO: 46.4 % (ref 42–52)
HDLC SERPL-MCNC: 36 MG/DL (ref 40–59)
HGB BLD-MCNC: 15.7 G/DL (ref 14–18)
LDL CHOLESTEROL CALCULATED: 69 MG/DL (ref 0–129)
LYMPHOCYTES # BLD: 1.8 K/UL (ref 1–4.8)
LYMPHOCYTES NFR BLD: 17.5 %
MCH RBC QN AUTO: 31.5 PG (ref 27–31.3)
MCHC RBC AUTO-ENTMCNC: 33.9 % (ref 33–37)
MCV RBC AUTO: 92.9 FL (ref 79–92.2)
MONOCYTES # BLD: 0.7 K/UL (ref 0.2–0.8)
MONOCYTES NFR BLD: 7.1 %
NEUTROPHILS # BLD: 7.4 K/UL (ref 1.4–6.5)
NEUTS SEG NFR BLD: 71.6 %
PLATELET # BLD AUTO: 368 K/UL (ref 130–400)
POTASSIUM SERPL-SCNC: 4.7 MEQ/L (ref 3.4–4.9)
PROT SERPL-MCNC: 7.4 G/DL (ref 6.3–8)
PSA SERPL-MCNC: 1.62 NG/ML (ref 0–4)
RBC # BLD AUTO: 5 M/UL (ref 4.7–6.1)
SODIUM SERPL-SCNC: 142 MEQ/L (ref 135–144)
TRIGLYCERIDE, FASTING: 115 MG/DL (ref 0–150)
WBC # BLD AUTO: 10.4 K/UL (ref 4.8–10.8)

## 2023-03-29 SDOH — ECONOMIC STABILITY: HOUSING INSECURITY
IN THE LAST 12 MONTHS, WAS THERE A TIME WHEN YOU DID NOT HAVE A STEADY PLACE TO SLEEP OR SLEPT IN A SHELTER (INCLUDING NOW)?: NO

## 2023-03-29 SDOH — ECONOMIC STABILITY: FOOD INSECURITY: WITHIN THE PAST 12 MONTHS, YOU WORRIED THAT YOUR FOOD WOULD RUN OUT BEFORE YOU GOT MONEY TO BUY MORE.: NEVER TRUE

## 2023-03-29 SDOH — ECONOMIC STABILITY: FOOD INSECURITY: WITHIN THE PAST 12 MONTHS, THE FOOD YOU BOUGHT JUST DIDN'T LAST AND YOU DIDN'T HAVE MONEY TO GET MORE.: NEVER TRUE

## 2023-03-29 SDOH — ECONOMIC STABILITY: INCOME INSECURITY: HOW HARD IS IT FOR YOU TO PAY FOR THE VERY BASICS LIKE FOOD, HOUSING, MEDICAL CARE, AND HEATING?: NOT HARD AT ALL

## 2023-03-29 ASSESSMENT — PATIENT HEALTH QUESTIONNAIRE - PHQ9
SUM OF ALL RESPONSES TO PHQ QUESTIONS 1-9: 0
2. FEELING DOWN, DEPRESSED OR HOPELESS: 0
SUM OF ALL RESPONSES TO PHQ QUESTIONS 1-9: 0
SUM OF ALL RESPONSES TO PHQ9 QUESTIONS 1 & 2: 0
SUM OF ALL RESPONSES TO PHQ QUESTIONS 1-9: 0
SUM OF ALL RESPONSES TO PHQ QUESTIONS 1-9: 0
1. LITTLE INTEREST OR PLEASURE IN DOING THINGS: 0

## 2023-03-29 NOTE — PROGRESS NOTES
Differential      2. Coronary artery disease involving native coronary artery of native heart without angina pectoris  Comprehensive Metabolic Panel    Lipid, Fasting      3. Insomnia, unspecified type        4. Prostate cancer screening  PSA Screening      5. Mixed hyperlipidemia  Comprehensive Metabolic Panel    Lipid, Fasting            Orders Placed This Encounter   Procedures    Comprehensive Metabolic Panel     Standing Status:   Future     Number of Occurrences:   1     Standing Expiration Date:   3/29/2024    Lipid, Fasting     Standing Status:   Future     Number of Occurrences:   1     Standing Expiration Date:   3/29/2024    CBC with Auto Differential     Standing Status:   Future     Number of Occurrences:   1     Standing Expiration Date:   3/28/2024    PSA Screening     Standing Status:   Future     Number of Occurrences:   1     Standing Expiration Date:   3/29/2024       No orders of the defined types were placed in this encounter. Medications Discontinued During This Encounter   Medication Reason    tadalafil (CIALIS) 10 MG tablet LIST CLEANUP    nitroGLYCERIN (NITROSTAT) 0.4 MG SL tablet LIST CLEANUP     Return in about 1 year (around 3/29/2024).     Desiree Nielsen PA-C

## 2023-03-29 NOTE — TELEPHONE ENCOUNTER
Patient was here for an appointment today. He was ordered lab work , he is requesting that lab work results also get forwarded to his cardiologist Dr. Linda Rueda.  (He said like we always do)

## 2023-06-29 DIAGNOSIS — G47.00 INSOMNIA, UNSPECIFIED TYPE: ICD-10-CM

## 2023-06-29 NOTE — TELEPHONE ENCOUNTER
patient requesting medication refill.  Please approve or deny this request.    Rx requested:  Requested Prescriptions     Pending Prescriptions Disp Refills    traZODone (DESYREL) 50 MG tablet 90 tablet 2     Sig: Take 1 tablet by mouth nightly         Last Office Visit:   3/29/2023      Next Visit Date:  Future Appointments   Date Time Provider [address]   3/29/2024  9:00 AM Desiree Nielsen PA-C [facility]

## 2023-06-30 RX ORDER — TRAZODONE HYDROCHLORIDE 50 MG/1
50 TABLET ORAL NIGHTLY
Qty: 90 TABLET | Refills: 3 | Status: SHIPPED | OUTPATIENT
Start: 2023-06-30

## 2023-08-01 LAB
LV EF: 37 %
LVEF MODALITY: NORMAL

## 2023-10-13 ENCOUNTER — TELEPHONE (OUTPATIENT)
Dept: FAMILY MEDICINE CLINIC | Age: 73
End: 2023-10-13

## 2023-10-20 ENCOUNTER — TELEMEDICINE (OUTPATIENT)
Dept: FAMILY MEDICINE CLINIC | Age: 73
End: 2023-10-20

## 2023-10-20 DIAGNOSIS — Z00.00 MEDICARE ANNUAL WELLNESS VISIT, SUBSEQUENT: Primary | ICD-10-CM

## 2023-10-20 ASSESSMENT — PATIENT HEALTH QUESTIONNAIRE - PHQ9
SUM OF ALL RESPONSES TO PHQ QUESTIONS 1-9: 0
2. FEELING DOWN, DEPRESSED OR HOPELESS: 0
SUM OF ALL RESPONSES TO PHQ9 QUESTIONS 1 & 2: 0
SUM OF ALL RESPONSES TO PHQ QUESTIONS 1-9: 0
SUM OF ALL RESPONSES TO PHQ QUESTIONS 1-9: 0
1. LITTLE INTEREST OR PLEASURE IN DOING THINGS: 0
SUM OF ALL RESPONSES TO PHQ QUESTIONS 1-9: 0

## 2023-10-20 ASSESSMENT — LIFESTYLE VARIABLES
HOW OFTEN DO YOU HAVE A DRINK CONTAINING ALCOHOL: MONTHLY OR LESS
HOW MANY STANDARD DRINKS CONTAINING ALCOHOL DO YOU HAVE ON A TYPICAL DAY: 1 OR 2

## 2023-10-20 NOTE — PROGRESS NOTES
Medicare Annual Wellness Visit    Mika Garcia is here for Medicare AWV    Assessment & Plan   Medicare annual wellness visit, subsequent  Recommendations for Preventive Services Due: see orders and patient instructions/AVS.  Recommended screening schedule for the next 5-10 years is provided to the patient in written form: see Patient Instructions/AVS.     No follow-ups on file. Subjective       Patient's complete Health Risk Assessment and screening values have been reviewed and are found in Flowsheets. The following problems were reviewed today and where indicated follow up appointments were made and/or referrals ordered. Positive Risk Factor Screenings with Interventions:                 Weight and Activity:  Physical Activity: Inactive (10/20/2023)    Exercise Vital Sign     Days of Exercise per Week: 0 days     Minutes of Exercise per Session: 0 min     On average, how many days per week do you engage in moderate to strenuous exercise (like a brisk walk)?: 0 days  Have you lost any weight without trying in the past 3 months?: No  There is no height or weight on file to calculate BMI. (!) Abnormal    Inactivity Interventions:  Patient declined any further interventions or treatment  Obesity Interventions:  Patient declines any further evaluation or treatment                Tobacco Use:  Tobacco Use: High Risk (10/20/2023)    Patient History     Smoking Tobacco Use: Light Smoker     Smokeless Tobacco Use: Never     Passive Exposure: Never     E-cigarette/Vaping       Questions Responses    E-cigarette/Vaping Use Never User    Start Date     Passive Exposure     Quit Date     Counseling Given     Comments           Interventions:  Patient declined any further intervention or treatment                        Objective      Patient-Reported Vitals  No data recorded            No Known Allergies  Prior to Visit Medications    Medication Sig Taking?  Authorizing Provider   traZODone (DESYREL) 50 MG tablet Take

## 2023-10-20 NOTE — PATIENT INSTRUCTIONS
by [facility] ([facility]). If you have questions about a medical condition or this instruction, always ask your healthcare professional. Hirenmaricruz Fermín any warranty or liability for your use of this information. Personalized Preventive Plan for Luigi Oar - 10/20/2023  Medicare offers a range of preventive health benefits. Some of the tests and screenings are paid in full while other may be subject to a deductible, co-insurance, and/or copay. Some of these benefits include a comprehensive review of your medical history including lifestyle, illnesses that may run in your family, and various assessments and screenings as appropriate. After reviewing your medical record and screening and assessments performed today your provider may have ordered immunizations, labs, imaging, and/or referrals for you. A list of these orders (if applicable) as well as your Preventive Care list are included within your After Visit Summary for your review. Other Preventive Recommendations:    A preventive eye exam performed by an eye specialist is recommended every 1-2 years to screen for glaucoma; cataracts, macular degeneration, and other eye disorders. A preventive dental visit is recommended every 6 months. Try to get at least 150 minutes of exercise per week or 10,000 steps per day on a pedometer . Order or download the FREE \"Exercise & Physical Activity: Your Everyday Guide\" from The "[employer]. Call 5-689.889.4202 or search The [employer] online. You need 7054-4669 mg of calcium and 3037-2066 IU of vitamin D per day. It is possible to meet your calcium requirement with diet alone, but a vitamin D supplement is usually necessary to meet this goal.  When exposed to the sun, use a sunscreen that protects against both UVA and UVB radiation with an SPF of 30 or greater. Reapply every 2 to 3 hours or after sweating, drying off with a towel, or swimming.   Always wear a seat

## 2023-10-30 ENCOUNTER — ANCILLARY PROCEDURE (OUTPATIENT)
Dept: RADIOLOGY | Facility: CLINIC | Age: 73
End: 2023-10-30
Payer: MEDICARE

## 2023-10-30 VITALS — BODY MASS INDEX: 31.23 KG/M2 | HEIGHT: 67 IN | WEIGHT: 199 LBS

## 2023-10-30 DIAGNOSIS — I25.2 OLD MYOCARDIAL INFARCTION: ICD-10-CM

## 2023-10-30 DIAGNOSIS — I25.5 ISCHEMIC CARDIOMYOPATHY: ICD-10-CM

## 2023-10-30 DIAGNOSIS — I25.10 ATHEROSCLEROTIC HEART DISEASE OF NATIVE CORONARY ARTERY WITHOUT ANGINA PECTORIS: ICD-10-CM

## 2023-10-30 PROCEDURE — 75561 CARDIAC MRI FOR MORPH W/DYE: CPT

## 2023-10-30 PROCEDURE — 75561 CARDIAC MRI FOR MORPH W/DYE: CPT | Performed by: INTERNAL MEDICINE

## 2023-10-30 PROCEDURE — A9575 INJ GADOTERATE MEGLUMI 0.1ML: HCPCS | Mod: MUE | Performed by: INTERNAL MEDICINE

## 2023-10-30 PROCEDURE — 2550000001 HC RX 255 CONTRASTS: Mod: MUE | Performed by: INTERNAL MEDICINE

## 2023-10-30 PROCEDURE — 75565 CARD MRI VELOC FLOW MAPPING: CPT | Performed by: INTERNAL MEDICINE

## 2023-10-30 PROCEDURE — 75565 CARD MRI VELOC FLOW MAPPING: CPT

## 2023-10-30 RX ORDER — GADOTERATE MEGLUMINE 376.9 MG/ML
36 INJECTION INTRAVENOUS
Status: COMPLETED | OUTPATIENT
Start: 2023-10-30 | End: 2023-10-30

## 2023-10-30 RX ADMIN — GADOTERATE MEGLUMINE 36 ML: 376.9 INJECTION INTRAVENOUS at 14:07

## 2023-10-31 PROBLEM — L30.1 DYSHIDROTIC ECZEMA: Status: ACTIVE | Noted: 2018-08-23

## 2023-10-31 PROBLEM — I25.5 ISCHEMIC CARDIOMYOPATHY: Status: ACTIVE | Noted: 2023-10-31

## 2023-10-31 PROBLEM — G56.00 CARPAL TUNNEL SYNDROME: Status: ACTIVE | Noted: 2018-10-22

## 2023-10-31 PROBLEM — I83.93 VARICOSE VEINS OF BOTH LOWER EXTREMITIES: Status: ACTIVE | Noted: 2018-07-31

## 2023-10-31 PROBLEM — I25.2 PAST MYOCARDIAL INFARCTION: Status: ACTIVE | Noted: 2023-10-31

## 2023-10-31 PROBLEM — I73.9 PERIPHERAL VASCULAR DISEASE (CMS-HCC): Status: ACTIVE | Noted: 2023-10-31

## 2023-10-31 PROBLEM — I25.10 ASHD (ARTERIOSCLEROTIC HEART DISEASE): Status: ACTIVE | Noted: 2023-10-31

## 2023-10-31 PROBLEM — F17.200 CURRENT SMOKER ON SOME DAYS: Status: ACTIVE | Noted: 2021-04-24

## 2023-10-31 PROBLEM — J44.9 COPD (CHRONIC OBSTRUCTIVE PULMONARY DISEASE) (MULTI): Status: ACTIVE | Noted: 2023-10-31

## 2023-10-31 PROBLEM — M51.26 HERNIATED NUCLEUS PULPOSUS, LUMBAR: Status: ACTIVE | Noted: 2021-04-24

## 2023-10-31 PROBLEM — D12.6 ADENOMATOUS POLYP OF COLON: Status: ACTIVE | Noted: 2021-11-16

## 2023-10-31 PROBLEM — R73.9 HYPERGLYCEMIA: Status: ACTIVE | Noted: 2023-10-31

## 2023-10-31 PROBLEM — Z95.1 S/P CABG (CORONARY ARTERY BYPASS GRAFT): Status: ACTIVE | Noted: 2023-10-31

## 2023-10-31 PROBLEM — M48.062 SPINAL STENOSIS, LUMBAR REGION WITH NEUROGENIC CLAUDICATION: Status: ACTIVE | Noted: 2021-04-24

## 2023-10-31 RX ORDER — METOPROLOL SUCCINATE 100 MG/1
100 TABLET, EXTENDED RELEASE ORAL DAILY
COMMUNITY
Start: 2023-10-22 | End: 2023-11-10 | Stop reason: SDUPTHER

## 2023-10-31 RX ORDER — TRAZODONE HYDROCHLORIDE 50 MG/1
50 TABLET ORAL NIGHTLY
COMMUNITY
Start: 2023-09-28

## 2023-10-31 RX ORDER — ASPIRIN 81 MG/1
1 TABLET ORAL DAILY
COMMUNITY

## 2023-10-31 RX ORDER — ATORVASTATIN CALCIUM 80 MG/1
80 TABLET, FILM COATED ORAL DAILY
COMMUNITY
Start: 2023-10-10 | End: 2023-11-10 | Stop reason: SDUPTHER

## 2023-10-31 RX ORDER — LISINOPRIL 20 MG/1
20 TABLET ORAL DAILY
COMMUNITY
Start: 2022-01-18 | End: 2023-11-10 | Stop reason: ALTCHOICE

## 2023-11-02 LAB
ALBUMIN SERPL-MCNC: 4.1 G/DL (ref 3.5–4.6)
ALP SERPL-CCNC: 72 U/L (ref 35–104)
ALT SERPL-CCNC: 30 U/L (ref 0–41)
ANION GAP SERPL CALCULATED.3IONS-SCNC: 12 MEQ/L (ref 9–15)
AST SERPL-CCNC: 28 U/L (ref 0–40)
BASOPHILS # BLD: 0.1 K/UL (ref 0–0.2)
BASOPHILS NFR BLD: 0.7 %
BILIRUB DIRECT SERPL-MCNC: <0.2 MG/DL (ref 0–0.4)
BILIRUB INDIRECT SERPL-MCNC: NORMAL MG/DL (ref 0–0.6)
BILIRUB SERPL-MCNC: <0.2 MG/DL (ref 0.2–0.7)
BUN SERPL-MCNC: 16 MG/DL (ref 8–23)
CALCIUM SERPL-MCNC: 9.2 MG/DL (ref 8.5–9.9)
CHLORIDE SERPL-SCNC: 106 MEQ/L (ref 95–107)
CHOLEST SERPL-MCNC: 129 MG/DL (ref 0–199)
CO2 SERPL-SCNC: 24 MEQ/L (ref 20–31)
CREAT SERPL-MCNC: 0.84 MG/DL (ref 0.7–1.2)
EOSINOPHIL # BLD: 0.8 K/UL (ref 0–0.7)
EOSINOPHIL NFR BLD: 7.4 %
ERYTHROCYTE [DISTWIDTH] IN BLOOD BY AUTOMATED COUNT: 12.4 % (ref 11.5–14.5)
GLUCOSE SERPL-MCNC: 113 MG/DL (ref 70–99)
HBA1C MFR BLD: 5.8 % (ref 4.8–5.9)
HCT VFR BLD AUTO: 46 % (ref 42–52)
HDLC SERPL-MCNC: 39 MG/DL (ref 40–59)
HGB BLD-MCNC: 15.1 G/DL (ref 14–18)
LDLC SERPL CALC-MCNC: 75 MG/DL (ref 0–129)
LYMPHOCYTES # BLD: 1.8 K/UL (ref 1–4.8)
LYMPHOCYTES NFR BLD: 16.5 %
MAGNESIUM SERPL-MCNC: 2 MG/DL (ref 1.7–2.4)
MCH RBC QN AUTO: 31.2 PG (ref 27–31.3)
MCHC RBC AUTO-ENTMCNC: 32.8 % (ref 33–37)
MCV RBC AUTO: 95 FL (ref 79–92.2)
MONOCYTES # BLD: 1.3 K/UL (ref 0.2–0.8)
MONOCYTES NFR BLD: 11.9 %
NEUTROPHILS # BLD: 6.9 K/UL (ref 1.4–6.5)
NEUTS SEG NFR BLD: 63 %
PLATELET # BLD AUTO: 370 K/UL (ref 130–400)
POTASSIUM SERPL-SCNC: 5 MEQ/L (ref 3.4–4.9)
PROT SERPL-MCNC: 6.7 G/DL (ref 6.3–8)
RBC # BLD AUTO: 4.84 M/UL (ref 4.7–6.1)
SODIUM SERPL-SCNC: 142 MEQ/L (ref 135–144)
TRIGL SERPL-MCNC: 77 MG/DL (ref 0–150)
TSH SERPL-MCNC: 2.39 UIU/ML (ref 0.44–3.86)
WBC # BLD AUTO: 10.9 K/UL (ref 4.8–10.8)

## 2023-11-10 ENCOUNTER — OFFICE VISIT (OUTPATIENT)
Dept: CARDIOLOGY | Facility: CLINIC | Age: 73
End: 2023-11-10
Payer: MEDICARE

## 2023-11-10 VITALS
SYSTOLIC BLOOD PRESSURE: 134 MMHG | WEIGHT: 209 LBS | HEART RATE: 76 BPM | DIASTOLIC BLOOD PRESSURE: 82 MMHG | BODY MASS INDEX: 32.73 KG/M2

## 2023-11-10 DIAGNOSIS — I25.10 ASHD (ARTERIOSCLEROTIC HEART DISEASE): ICD-10-CM

## 2023-11-10 DIAGNOSIS — G56.03 BILATERAL CARPAL TUNNEL SYNDROME: ICD-10-CM

## 2023-11-10 DIAGNOSIS — R73.9 HYPERGLYCEMIA: ICD-10-CM

## 2023-11-10 DIAGNOSIS — J44.9 CHRONIC OBSTRUCTIVE PULMONARY DISEASE, UNSPECIFIED COPD TYPE (MULTI): ICD-10-CM

## 2023-11-10 DIAGNOSIS — I10 BENIGN ESSENTIAL HYPERTENSION: ICD-10-CM

## 2023-11-10 DIAGNOSIS — I25.810 CORONARY ARTERY DISEASE INVOLVING CORONARY BYPASS GRAFT OF NATIVE HEART WITHOUT ANGINA PECTORIS: ICD-10-CM

## 2023-11-10 DIAGNOSIS — Z95.1 S/P CABG (CORONARY ARTERY BYPASS GRAFT): Primary | ICD-10-CM

## 2023-11-10 DIAGNOSIS — E78.2 MIXED HYPERLIPIDEMIA: ICD-10-CM

## 2023-11-10 DIAGNOSIS — G47.33 OBSTRUCTIVE SLEEP APNEA SYNDROME: ICD-10-CM

## 2023-11-10 DIAGNOSIS — I25.5 ISCHEMIC CARDIOMYOPATHY: ICD-10-CM

## 2023-11-10 DIAGNOSIS — I25.2 PAST MYOCARDIAL INFARCTION: ICD-10-CM

## 2023-11-10 DIAGNOSIS — F17.200 CURRENT SMOKER ON SOME DAYS: ICD-10-CM

## 2023-11-10 PROCEDURE — 1159F MED LIST DOCD IN RCRD: CPT | Performed by: INTERNAL MEDICINE

## 2023-11-10 PROCEDURE — 3079F DIAST BP 80-89 MM HG: CPT | Performed by: INTERNAL MEDICINE

## 2023-11-10 PROCEDURE — 99214 OFFICE O/P EST MOD 30 MIN: CPT | Performed by: INTERNAL MEDICINE

## 2023-11-10 PROCEDURE — 3075F SYST BP GE 130 - 139MM HG: CPT | Performed by: INTERNAL MEDICINE

## 2023-11-10 PROCEDURE — 93000 ELECTROCARDIOGRAM COMPLETE: CPT | Performed by: INTERNAL MEDICINE

## 2023-11-10 PROCEDURE — 1036F TOBACCO NON-USER: CPT | Performed by: INTERNAL MEDICINE

## 2023-11-10 RX ORDER — METOPROLOL SUCCINATE 100 MG/1
100 TABLET, EXTENDED RELEASE ORAL DAILY
Qty: 90 TABLET | Refills: 1 | Status: SHIPPED | OUTPATIENT
Start: 2023-11-10 | End: 2024-05-10 | Stop reason: SDUPTHER

## 2023-11-10 RX ORDER — LISINOPRIL 40 MG/1
40 TABLET ORAL DAILY
Qty: 90 TABLET | Refills: 1 | Status: SHIPPED | OUTPATIENT
Start: 2023-11-10 | End: 2024-04-19

## 2023-11-10 RX ORDER — SPIRONOLACTONE 25 MG/1
25 TABLET ORAL DAILY
Qty: 90 TABLET | Refills: 1 | Status: SHIPPED | OUTPATIENT
Start: 2023-11-10 | End: 2024-04-19

## 2023-11-10 RX ORDER — ATORVASTATIN CALCIUM 80 MG/1
80 TABLET, FILM COATED ORAL DAILY
Qty: 90 TABLET | Refills: 1 | Status: SHIPPED | OUTPATIENT
Start: 2023-11-10 | End: 2024-05-10 | Stop reason: SDUPTHER

## 2023-11-10 ASSESSMENT — ENCOUNTER SYMPTOMS
OCCASIONAL FEELINGS OF UNSTEADINESS: 0
LOSS OF SENSATION IN FEET: 0
DEPRESSION: 0

## 2023-11-10 NOTE — PROGRESS NOTES
CARDIOLOGY OFFICE NOTE    Date:   11/10/2023    Patient:    Gorge Ruvalcaba    YOB: 1950    Primary Physician: Jerica Wahl PA-C       Reason for Visit: 3-month follow-up with testing results.      HPI:     Gorge Ruvalcaba was seen in cardiac evaluation at the  Cardiology office November 10, 2023.      The patients problems are listed as in the impression below.    Electronic medical records reviewed.    Patient returns.  3-month follow-up for cardiomyopathy and history of coronary artery bypass surgery.    Patient states he is doing well overall.  Cardiac MRI was performed which noted ejection fraction 51% with severe basilar hypokinesia as noted below.    Patient feels well otherwise.  Is asymptomatic.    Patient denies Chest Pain, SOB, Lightheadedness, Dizziness, TIA or CVA symptoms.  No CHF or Edema.  No Palpitations.  No GI,  or Bleeding Issues. No Recent Fever or Chills.     Cardiovascular and general review of systems is otherwise negative.    A 14-system review is otherwise negative, other than noted.     PHYSICAL EXAMINATION:      Vitals:    11/10/23 0836   BP: 134/82   Pulse: 76     General: No acute distress. Vital signs as noted. Alert and oriented.  Head And Neck Examination: No jugular venous distention, no carotid bruits, no mass. Carotid upstrokes preserved. Oral mucosa moist.  No xanthelasma. Head and neck examination otherwise unremarkable.  Lungs: Clear to auscultation and percussion. No wheezes, no rales,  and no rhonchi.  Chest: Excursion appeared to be normal. No chest wall tenderness on palpation.  Heart: Normal S1 and S2. No S3. No S4. No rub. Grade 1/6 systolic murmur, best heard at the left sternal border. Point of maximal impulse was within normal limits.  Abdomen: Soft. Nontender. No organomegaly. No bruits. No masses. Obese.  Extremities: No bipedal edema. No clubbing. No cyanosis.  Pulses are strong throughout. No bruits.  Musculoskeletal Exam: No ulcers,  otherwise unremarkable.  Neuro: Neurologically appeared grossly intact.  .  IMPRESSION:      Cardiovascular status stable  Asymptomatic  Coronary artery disease history prior coronary bypass surgery  Negative Myoview perfusion study for ischemia 8/23  Ischemic cardiomyopathy, LVEF 45-50%, Cardiac MR 10/23 EF 51% with basal severe Hypokinesis.  Diastolic dysfunction  Prior myocardial infarction  Abnormal EKG  Hypertension  Hyperlipidemia  Hyperglycemia  Peripheral vascular disease  Lower extremity varicose veins, symptomatic.  COPD  Obesity  Former smoker  Otherwise as per assessment below.    RECOMMENDATIONS:      Patient was reassured of the above findings.  He does have evidence of prior myocardial infarction and history of coronary bypass surgery.  Would suggest given his a symptomatology that we treat him conservatively at this time.  We will reserve cardiac catheterization for later if symptoms worsen.    Would suggest patient adjust medications with taking Toprol- mg daily, Aldactone 25 mg daily, lisinopril 40 mg daily.  Patient will continue other medications.  Refills were provided.    Exercise dietary program was encouraged.  Hydration.    [employer] portal use was encouraged.    We will plan to see back in 6 months with Laboratory Studies and ECG as ordered.     Patient will follow up with their primary physician for general care.    The patient knows to contact medical care earlier if need be.      ALLERGIES:     No Known Allergies     MEDICATIONS:     Current Outpatient Medications   Medication Instructions    aspirin 81 mg EC tablet 1 tablet, oral, Daily    atorvastatin (LIPITOR) 80 mg, Daily    lisinopril 20 mg, Daily    metoprolol succinate XL (TOPROL-XL) 100 mg, Daily    traZODone (DESYREL) 50 mg, Nightly       ELECTROCARDIOGRAM:      Sinus rhythm, nonspecific ST wave changes.  Rate 75.    CARDIAC TESTING:      Cardiac MRI: Cardiomegaly with ejection fraction 51% and severe basilar hypokinesia  consistent with prior microinfarction.  Left atrial enlargement.  Right ventricle normal.  No shunting.  No significant valvular heart disease.    LABORATORY DATA:      Chem-7, CBC, liver functions, TSH, magnesium normal except for potassium 5.0 and glucose 113.  Hemoglobin A1c 5.8.  Cholesterol 129, triglyceride 77, HDL cholesterol 39, LDL 75.  Otherwise      PROBLEM LIST:     Patient Active Problem List   Diagnosis    Adenomatous polyp of colon    Anxiety    ASHD (arteriosclerotic heart disease)    CAD (coronary artery disease) of artery bypass graft    Benign essential hypertension    Carpal tunnel syndrome    COPD (chronic obstructive pulmonary disease) (CMS/[facility])    Current smoker on some days    Dyshidrotic eczema    Herniated nucleus pulposus, lumbar    Hyperglycemia    Hyperlipidemia    Ischemic cardiomyopathy    Keratosis    Local neurodermatitis    Past myocardial infarction    Peripheral vascular disease (CMS/[facility])    S/P CABG (coronary artery bypass graft)    Sleep apnea    Spinal stenosis, lumbar region with neurogenic claudication    Varicose veins of both lower extremities             Louie Aranda MD, [facility]   [facility] /  Cardiology      Of Note:  [employer] voice recognition dictation software was utilized partially in the preparation of this note, therefore, inaccuracies in spelling, word choice and punctuation may have occurred which were not recognized the time of signing.    Patient was seen and examined with total time of visit including chart preparation, rooming, and chart completion exceeding 40 minutes.      ----   Yes

## 2024-03-08 ENCOUNTER — OFFICE VISIT (OUTPATIENT)
Dept: FAMILY MEDICINE CLINIC | Age: 74
End: 2024-03-08

## 2024-03-08 VITALS
SYSTOLIC BLOOD PRESSURE: 124 MMHG | OXYGEN SATURATION: 100 % | BODY MASS INDEX: 31.08 KG/M2 | HEART RATE: 76 BPM | TEMPERATURE: 97.2 F | WEIGHT: 198 LBS | DIASTOLIC BLOOD PRESSURE: 62 MMHG | HEIGHT: 67 IN

## 2024-03-08 DIAGNOSIS — J40 BRONCHITIS: Primary | ICD-10-CM

## 2024-03-08 RX ORDER — METOPROLOL SUCCINATE 100 MG/1
100 TABLET, EXTENDED RELEASE ORAL DAILY
COMMUNITY
Start: 2023-12-28

## 2024-03-08 RX ORDER — AZITHROMYCIN 250 MG/1
TABLET, FILM COATED ORAL
Qty: 6 TABLET | Refills: 0 | Status: SHIPPED | OUTPATIENT
Start: 2024-03-08 | End: 2024-03-18

## 2024-03-08 RX ORDER — SPIRONOLACTONE 25 MG/1
25 TABLET ORAL DAILY
COMMUNITY
Start: 2023-11-10

## 2024-03-08 RX ORDER — LISINOPRIL 40 MG/1
40 TABLET ORAL DAILY
COMMUNITY
Start: 2024-01-20

## 2024-03-08 ASSESSMENT — PATIENT HEALTH QUESTIONNAIRE - PHQ9
SUM OF ALL RESPONSES TO PHQ9 QUESTIONS 1 & 2: 0
2. FEELING DOWN, DEPRESSED OR HOPELESS: 0
SUM OF ALL RESPONSES TO PHQ QUESTIONS 1-9: 0
1. LITTLE INTEREST OR PLEASURE IN DOING THINGS: 0

## 2024-03-08 ASSESSMENT — ENCOUNTER SYMPTOMS
ABDOMINAL PAIN: 0
EYE DISCHARGE: 0
CHEST TIGHTNESS: 1
COUGH: 1
STRIDOR: 0
WHEEZING: 0
DIARRHEA: 0
TROUBLE SWALLOWING: 0
EYE PAIN: 0
SINUS PRESSURE: 0
RHINORRHEA: 0
NAUSEA: 0
EYE REDNESS: 0
SINUS PAIN: 0
SHORTNESS OF BREATH: 0
EYE ITCHING: 0
SORE THROAT: 0
VOMITING: 0
VOICE CHANGE: 0

## 2024-03-08 NOTE — PROGRESS NOTES
Subjective:      Patient ID: Fredy Youssef is a 73 y.o. male who presents today for:  Chief Complaint   Patient presents with    URI     Pt states cough, congestion, change of voice, diarrhea x 7 days. Has taken dayquil, throat lozenges.        HPI  Patient is here with cough and chest congestion for the last 7 days.  Says he has no SOB.   Says the cough is dry.   Says he has no fever.  Mild laryngitis.   Cough is not keeping him up at night.   Says he has no CP or tightness.   Says he has some wheezing.   Says he is taking cold med for sx.   Report he does not want a CXR.  Past Medical History:   Diagnosis Date    CAD (coronary artery disease) 2012    had CABG    HIGH CHOLESTEROL     Hypercholesterolemia     Hyperlipidemia     Hypertension     Keratosis     Neuropathy     Psoriasis     Sleep apnea      Past Surgical History:   Procedure Laterality Date    CARDIAC SURGERY      CARPAL TUNNEL RELEASE Left     CATARACT EXTRACTION      1/2023    COLONOSCOPY  09/10/2012    dr deng /2 polyps, diverticulosis, int hemmorrhoids    COLONOSCOPY N/A 11/16/2021    COLORECTAL CANCER SCREENING, HIGH RISK performed by Chapis Deng MD at [facility]    CORONARY ARTERY BYPASS GRAFT  01/09/2012    CABG X 3    LAMINECTOMY Bilateral 05/05/2021    BILATERAL L 3-4 MICRODECOMPRESSION performed by Luh Ramsey MD [facility]    IA NEUROPLASTY &/TRANSPOS MEDIAN NRV CARPAL TUNNE Right 10/30/2018    RIGHT WRIST CARPAL TUNNEL RELEASE SUPINE performed by Christopher Alaniz MD at [facility]    TONSILLECTOMY      VASECTOMY      1993     Social History     Socioeconomic History    Marital status:      Spouse name: Not on file    Number of children: Not on file    Years of education: Not on file    Highest education level: Not on file   Occupational History    Not on file   Tobacco Use    Smoking status: Light Smoker     Current packs/day: 0.00     Average packs/day: 0.5 packs/day for 20.0 years (10.0 ttl pk-yrs)     Types: Cigarettes

## 2024-04-01 ENCOUNTER — OFFICE VISIT (OUTPATIENT)
Dept: FAMILY MEDICINE CLINIC | Age: 74
End: 2024-04-01

## 2024-04-01 VITALS
WEIGHT: 206 LBS | DIASTOLIC BLOOD PRESSURE: 70 MMHG | SYSTOLIC BLOOD PRESSURE: 132 MMHG | TEMPERATURE: 97.4 F | HEIGHT: 67 IN | HEART RATE: 78 BPM | OXYGEN SATURATION: 98 % | BODY MASS INDEX: 32.33 KG/M2

## 2024-04-01 DIAGNOSIS — Z12.5 PROSTATE CANCER SCREENING: ICD-10-CM

## 2024-04-01 DIAGNOSIS — H65.93 MIDDLE EAR EFFUSION, BILATERAL: ICD-10-CM

## 2024-04-01 DIAGNOSIS — I25.10 CORONARY ARTERY DISEASE INVOLVING NATIVE CORONARY ARTERY OF NATIVE HEART WITHOUT ANGINA PECTORIS: Primary | ICD-10-CM

## 2024-04-01 RX ORDER — LORATADINE 10 MG/1
10 TABLET ORAL DAILY
Qty: 30 TABLET | Refills: 2 | Status: SHIPPED | OUTPATIENT
Start: 2024-04-01

## 2024-04-01 SDOH — ECONOMIC STABILITY: FOOD INSECURITY: WITHIN THE PAST 12 MONTHS, THE FOOD YOU BOUGHT JUST DIDN'T LAST AND YOU DIDN'T HAVE MONEY TO GET MORE.: NEVER TRUE

## 2024-04-01 SDOH — ECONOMIC STABILITY: FOOD INSECURITY: WITHIN THE PAST 12 MONTHS, YOU WORRIED THAT YOUR FOOD WOULD RUN OUT BEFORE YOU GOT MONEY TO BUY MORE.: NEVER TRUE

## 2024-04-01 SDOH — ECONOMIC STABILITY: INCOME INSECURITY: HOW HARD IS IT FOR YOU TO PAY FOR THE VERY BASICS LIKE FOOD, HOUSING, MEDICAL CARE, AND HEATING?: NOT VERY HARD

## 2024-04-01 NOTE — PROGRESS NOTES
Subjective  Fredy Youssef, 74 y.o. male presents today with:  Chief Complaint   Patient presents with    1 Year Follow Up     Patient presents today for a yearly check up.        HPI      Review of Systems      Past Medical History:   Diagnosis Date    CAD (coronary artery disease)     had CABG    HIGH CHOLESTEROL     Hypercholesterolemia     Hyperlipidemia     Hypertension     Keratosis     Neuropathy     Psoriasis     Sleep apnea      Past Surgical History:   Procedure Laterality Date    CARDIAC SURGERY      CARPAL TUNNEL RELEASE Left     CATARACT EXTRACTION      2023    COLONOSCOPY  09/10/2012    dr deng /2 polyps, diverticulosis, int hemmorrhoids    COLONOSCOPY N/A 2021    COLORECTAL CANCER SCREENING, HIGH RISK performed by Chapis [facility]eng MD at [facility]    CORONARY ARTERY BYPASS GRAFT  2012    CABG X 3    LAMINECTOMY Bilateral 2021    BILATERAL L 3-4 MICRODECOMPRESSION performed by Luh Ramsey MD at [facility]    MA NEUROPLASTY &/TRANSPOS MEDIAN NRV CARPAL TUNNE Right 10/30/2018    RIGHT WRIST CARPAL TUNNEL RELEASE SUPINE performed by Christopher Alaniz MD at [facility] OR    TONSILLECTOMY      VASECTOMY           Social History     Socioeconomic History    Marital status:      Spouse name: Not on file    Number of children: Not on file    Years of education: Not on file    Highest education level: Not on file   Occupational History    Not on file   Tobacco Use    Smoking status: Light Smoker     Current packs/day: 0.00     Average packs/day: 0.5 packs/day for 20.0 years (10.0 ttl pk-yrs)     Types: Cigarettes     Start date: 1992     Last attempt to quit: 2012     Years since quittin.2     Passive exposure: Never    Smokeless tobacco: Never    Tobacco comments:     mentions only when he is drinking    Vaping Use    Vaping Use: Never used   Substance and Sexual Activity    Alcohol use: Yes     Comment: social     Drug use: No    Sexual activity: Not Currently

## 2024-05-03 ENCOUNTER — LAB (OUTPATIENT)
Dept: LAB | Facility: LAB | Age: 74
End: 2024-05-03
Payer: MEDICARE

## 2024-05-03 DIAGNOSIS — I25.10 ATHEROSCLEROTIC HEART DISEASE OF NATIVE CORONARY ARTERY WITHOUT ANGINA PECTORIS: ICD-10-CM

## 2024-05-03 DIAGNOSIS — I25.5 ISCHEMIC CARDIOMYOPATHY: ICD-10-CM

## 2024-05-03 DIAGNOSIS — J44.9 CHRONIC OBSTRUCTIVE PULMONARY DISEASE, UNSPECIFIED COPD TYPE (MULTI): ICD-10-CM

## 2024-05-03 DIAGNOSIS — Z95.1 S/P CABG (CORONARY ARTERY BYPASS GRAFT): ICD-10-CM

## 2024-05-03 DIAGNOSIS — E78.2 MIXED HYPERLIPIDEMIA: ICD-10-CM

## 2024-05-03 DIAGNOSIS — I25.10 ASHD (ARTERIOSCLEROTIC HEART DISEASE): ICD-10-CM

## 2024-05-03 DIAGNOSIS — G47.33 OBSTRUCTIVE SLEEP APNEA SYNDROME: ICD-10-CM

## 2024-05-03 DIAGNOSIS — I10 BENIGN ESSENTIAL HYPERTENSION: ICD-10-CM

## 2024-05-03 DIAGNOSIS — Z12.5 ENCOUNTER FOR SCREENING FOR MALIGNANT NEOPLASM OF PROSTATE: Primary | ICD-10-CM

## 2024-05-03 DIAGNOSIS — I25.2 PAST MYOCARDIAL INFARCTION: ICD-10-CM

## 2024-05-03 DIAGNOSIS — G56.03 BILATERAL CARPAL TUNNEL SYNDROME: ICD-10-CM

## 2024-05-03 DIAGNOSIS — I25.810 CORONARY ARTERY DISEASE INVOLVING CORONARY BYPASS GRAFT OF NATIVE HEART WITHOUT ANGINA PECTORIS: ICD-10-CM

## 2024-05-03 DIAGNOSIS — F17.200 CURRENT SMOKER ON SOME DAYS: ICD-10-CM

## 2024-05-03 DIAGNOSIS — R73.9 HYPERGLYCEMIA: ICD-10-CM

## 2024-05-03 LAB
ALBUMIN SERPL BCP-MCNC: 4.3 G/DL (ref 3.4–5)
ALP SERPL-CCNC: 66 U/L (ref 33–136)
ALT SERPL W P-5'-P-CCNC: 21 U/L (ref 10–52)
ANION GAP SERPL CALC-SCNC: 11 MMOL/L (ref 10–20)
AST SERPL W P-5'-P-CCNC: 19 U/L (ref 9–39)
BASOPHILS # BLD AUTO: 0.06 X10*3/UL (ref 0–0.1)
BASOPHILS NFR BLD AUTO: 0.6 %
BILIRUB DIRECT SERPL-MCNC: 0.1 MG/DL (ref 0–0.3)
BILIRUB SERPL-MCNC: 0.5 MG/DL (ref 0–1.2)
BUN SERPL-MCNC: 18 MG/DL (ref 6–23)
CALCIUM SERPL-MCNC: 9.4 MG/DL (ref 8.6–10.3)
CHLORIDE SERPL-SCNC: 107 MMOL/L (ref 98–107)
CHOLEST SERPL-MCNC: 111 MG/DL (ref 0–199)
CHOLESTEROL/HDL RATIO: 3.1
CO2 SERPL-SCNC: 25 MMOL/L (ref 21–32)
CREAT SERPL-MCNC: 1.02 MG/DL (ref 0.5–1.3)
EGFRCR SERPLBLD CKD-EPI 2021: 78 ML/MIN/1.73M*2
EOSINOPHIL # BLD AUTO: 0.74 X10*3/UL (ref 0–0.4)
EOSINOPHIL NFR BLD AUTO: 7.4 %
ERYTHROCYTE [DISTWIDTH] IN BLOOD BY AUTOMATED COUNT: 12.3 % (ref 11.5–14.5)
GLUCOSE SERPL-MCNC: 102 MG/DL (ref 74–99)
HCT VFR BLD AUTO: 41.2 % (ref 41–52)
HDLC SERPL-MCNC: 36 MG/DL
HGB BLD-MCNC: 13.9 G/DL (ref 13.5–17.5)
IMM GRANULOCYTES # BLD AUTO: 0.04 X10*3/UL (ref 0–0.5)
IMM GRANULOCYTES NFR BLD AUTO: 0.4 % (ref 0–0.9)
LDLC SERPL CALC-MCNC: 63 MG/DL
LYMPHOCYTES # BLD AUTO: 1.62 X10*3/UL (ref 0.8–3)
LYMPHOCYTES NFR BLD AUTO: 16.1 %
MAGNESIUM SERPL-MCNC: 1.99 MG/DL (ref 1.6–2.4)
MCH RBC QN AUTO: 32.5 PG (ref 26–34)
MCHC RBC AUTO-ENTMCNC: 33.7 G/DL (ref 32–36)
MCV RBC AUTO: 96 FL (ref 80–100)
MONOCYTES # BLD AUTO: 0.97 X10*3/UL (ref 0.05–0.8)
MONOCYTES NFR BLD AUTO: 9.7 %
NEUTROPHILS # BLD AUTO: 6.62 X10*3/UL (ref 1.6–5.5)
NEUTROPHILS NFR BLD AUTO: 65.8 %
NON HDL CHOLESTEROL: 75 MG/DL (ref 0–149)
NRBC BLD-RTO: 0 /100 WBCS (ref 0–0)
PLATELET # BLD AUTO: 381 X10*3/UL (ref 150–450)
POTASSIUM SERPL-SCNC: 4.7 MMOL/L (ref 3.5–5.3)
PROT SERPL-MCNC: 6.8 G/DL (ref 6.4–8.2)
PSA SERPL-MCNC: 0.61 NG/ML
RBC # BLD AUTO: 4.28 X10*6/UL (ref 4.5–5.9)
SODIUM SERPL-SCNC: 138 MMOL/L (ref 136–145)
TRIGL SERPL-MCNC: 59 MG/DL (ref 0–149)
TSH SERPL-ACNC: 1.94 MIU/L (ref 0.44–3.98)
VLDL: 12 MG/DL (ref 0–40)
WBC # BLD AUTO: 10.1 X10*3/UL (ref 4.4–11.3)

## 2024-05-03 PROCEDURE — 80061 LIPID PANEL: CPT

## 2024-05-03 PROCEDURE — 36415 COLL VENOUS BLD VENIPUNCTURE: CPT

## 2024-05-03 PROCEDURE — 83036 HEMOGLOBIN GLYCOSYLATED A1C: CPT

## 2024-05-03 PROCEDURE — 82248 BILIRUBIN DIRECT: CPT

## 2024-05-03 PROCEDURE — 84443 ASSAY THYROID STIM HORMONE: CPT

## 2024-05-03 PROCEDURE — 85025 COMPLETE CBC W/AUTO DIFF WBC: CPT

## 2024-05-03 PROCEDURE — G0103 PSA SCREENING: HCPCS

## 2024-05-03 PROCEDURE — 80053 COMPREHEN METABOLIC PANEL: CPT

## 2024-05-03 PROCEDURE — 83735 ASSAY OF MAGNESIUM: CPT

## 2024-05-04 LAB
EST. AVERAGE GLUCOSE BLD GHB EST-MCNC: 94 MG/DL
HBA1C MFR BLD: 4.9 %

## 2024-05-10 ENCOUNTER — OFFICE VISIT (OUTPATIENT)
Dept: CARDIOLOGY | Facility: CLINIC | Age: 74
End: 2024-05-10
Payer: MEDICARE

## 2024-05-10 VITALS
HEIGHT: 67 IN | SYSTOLIC BLOOD PRESSURE: 136 MMHG | DIASTOLIC BLOOD PRESSURE: 72 MMHG | BODY MASS INDEX: 31.71 KG/M2 | WEIGHT: 202 LBS | HEART RATE: 78 BPM

## 2024-05-10 DIAGNOSIS — I25.5 ISCHEMIC CARDIOMYOPATHY: ICD-10-CM

## 2024-05-10 DIAGNOSIS — R73.9 HYPERGLYCEMIA: ICD-10-CM

## 2024-05-10 DIAGNOSIS — Z95.1 S/P CABG (CORONARY ARTERY BYPASS GRAFT): ICD-10-CM

## 2024-05-10 DIAGNOSIS — G47.33 OBSTRUCTIVE SLEEP APNEA SYNDROME: ICD-10-CM

## 2024-05-10 DIAGNOSIS — J44.9 CHRONIC OBSTRUCTIVE PULMONARY DISEASE, UNSPECIFIED COPD TYPE (MULTI): ICD-10-CM

## 2024-05-10 DIAGNOSIS — I25.2 PAST MYOCARDIAL INFARCTION: ICD-10-CM

## 2024-05-10 DIAGNOSIS — I25.10 ASHD (ARTERIOSCLEROTIC HEART DISEASE): ICD-10-CM

## 2024-05-10 DIAGNOSIS — G56.03 BILATERAL CARPAL TUNNEL SYNDROME: ICD-10-CM

## 2024-05-10 DIAGNOSIS — E78.2 MIXED HYPERLIPIDEMIA: ICD-10-CM

## 2024-05-10 DIAGNOSIS — I10 BENIGN ESSENTIAL HYPERTENSION: ICD-10-CM

## 2024-05-10 DIAGNOSIS — F17.200 CURRENT SMOKER ON SOME DAYS: ICD-10-CM

## 2024-05-10 DIAGNOSIS — I25.810 CORONARY ARTERY DISEASE INVOLVING CORONARY BYPASS GRAFT OF NATIVE HEART WITHOUT ANGINA PECTORIS: ICD-10-CM

## 2024-05-10 PROCEDURE — 3075F SYST BP GE 130 - 139MM HG: CPT | Performed by: INTERNAL MEDICINE

## 2024-05-10 PROCEDURE — 1036F TOBACCO NON-USER: CPT | Performed by: INTERNAL MEDICINE

## 2024-05-10 PROCEDURE — 99214 OFFICE O/P EST MOD 30 MIN: CPT | Performed by: INTERNAL MEDICINE

## 2024-05-10 PROCEDURE — 3078F DIAST BP <80 MM HG: CPT | Performed by: INTERNAL MEDICINE

## 2024-05-10 PROCEDURE — 1159F MED LIST DOCD IN RCRD: CPT | Performed by: INTERNAL MEDICINE

## 2024-05-10 RX ORDER — METOPROLOL SUCCINATE 100 MG/1
100 TABLET, EXTENDED RELEASE ORAL DAILY
Qty: 90 TABLET | Refills: 3 | Status: SHIPPED | OUTPATIENT
Start: 2024-05-10 | End: 2025-05-10

## 2024-05-10 RX ORDER — LISINOPRIL 40 MG/1
40 TABLET ORAL DAILY
Qty: 90 TABLET | Refills: 3 | Status: SHIPPED | OUTPATIENT
Start: 2024-05-10 | End: 2025-05-10

## 2024-05-10 RX ORDER — ATORVASTATIN CALCIUM 80 MG/1
80 TABLET, FILM COATED ORAL DAILY
Qty: 90 TABLET | Refills: 3 | Status: SHIPPED | OUTPATIENT
Start: 2024-05-10 | End: 2025-05-10

## 2024-05-10 RX ORDER — LORATADINE 10 MG/1
1 TABLET ORAL DAILY
COMMUNITY
Start: 2024-04-01

## 2024-05-10 RX ORDER — SPIRONOLACTONE 25 MG/1
25 TABLET ORAL DAILY
Qty: 90 TABLET | Refills: 3 | Status: SHIPPED | OUTPATIENT
Start: 2024-05-10 | End: 2025-05-10

## 2024-05-10 NOTE — PROGRESS NOTES
CARDIOLOGY OFFICE NOTE     Date:   5/10/2024    Patient:    Gorge Ruvalcaba    YOB: 1950    Primary Physician: Jerica Wahl PA-C       Reason for Visit: 6-month cardiology follow-up.    HPI:     Gorge Ruvalcaba was seen in cardiac evaluation at the  Cardiology office May 10, 2024.      The patients problems are listed as in the impression below.    Electronic medical records reviewed.    Patient returns.  He states that he is well.  He is active.  He has several acres of property that he is maintaining.  He planted 11 blocks with trees yesterday without symptoms.  He is active without limitations.    No new complaints.    Patient denies Chest Pain, SOB, Lightheadedness, Dizziness, TIA or CVA symptoms.  No CHF or Edema.  No Palpitations.  No GI,  or Bleeding Issues. No Recent Fever or Chills.     Cardiovascular and general review of systems is otherwise negative.    A 14-system review is otherwise negative, other than noted.     PHYSICAL EXAMINATION:      Vitals:    05/10/24 0957   BP: 136/72   Pulse: 78     General: No acute distress. Vital signs as noted. Alert and oriented.  Head And Neck Examination: No jugular venous distention, no carotid bruits, no mass. Carotid upstrokes preserved. Oral mucosa moist.  No xanthelasma. Head and neck examination otherwise unremarkable.  Lungs: Clear to auscultation and percussion. No wheezes, no rales,  and no rhonchi.  Chest: Excursion appeared to be normal. No chest wall tenderness on palpation.  Heart: Normal S1 and S2. No S3. No S4. No rub. Grade 1/6 systolic murmur, best heard at the left sternal border. Point of maximal impulse was within normal limits.  Abdomen: Soft. Nontender. No organomegaly. No bruits. No masses. Obese.  Extremities: No bipedal edema. No clubbing. No cyanosis.  Pulses are strong throughout. No bruits.  Musculoskeletal Exam: No ulcers, otherwise unremarkable.  Neuro: Neurologically appeared grossly intact.  .  IMPRESSION:        Cardiovascular status stable  Asymptomatic  Coronary artery disease history prior coronary bypass surgery  Negative Myoview perfusion study for ischemia 8/23  Ischemic cardiomyopathy, LVEF 45-50%, Cardiac MR 10/23 EF 51% with basal severe Hypokinesis.  Diastolic dysfunction  Prior myocardial infarction  Abnormal EKG  Hypertension  Hyperlipidemia  Hyperglycemia  Peripheral vascular disease  Lower extremity varicose veins, symptomatic.  COPD  Obesity  Former smoker  Otherwise as per assessment below    RECOMMENDATIONS:      Would suggest patient continue his current medications.  Refills were provided.    Exercise dietary program was encouraged.    Hydration.    [employer] portal use was encouraged.    We will plan to see back in 1 year with Laboratory Studies and ECG as ordered.     Patient will follow up with their primary physician for general care.    The patient knows to contact medical care earlier if need be.      ALLERGIES:     No Known Allergies     MEDICATIONS:     Current Outpatient Medications   Medication Instructions    aspirin 81 mg EC tablet 1 tablet, oral, Daily    atorvastatin (LIPITOR) 80 mg, oral, Daily    lisinopril 40 mg, oral, Daily    loratadine (Claritin) 10 mg tablet 1 tablet, oral, Daily    metoprolol succinate XL (TOPROL-XL) 100 mg, oral, Daily    spironolactone (ALDACTONE) 25 mg, oral, Daily    traZODone (DESYREL) 50 mg, oral, Nightly       ELECTROCARDIOGRAM:      None this visit    CARDIAC TESTING:      None this visit    LABORATORY DATA:      CBC:   Lab Results   Component Value Date    WBC 10.1 05/03/2024    RBC 4.28 (L) 05/03/2024    HGB 13.9 05/03/2024    HCT 41.2 05/03/2024     05/03/2024        CMP:    Lab Results   Component Value Date     05/03/2024    K 4.7 05/03/2024     05/03/2024    CO2 25 05/03/2024    BUN 18 05/03/2024    CREATININE 1.02 05/03/2024    GLUCOSE 102 (H) 05/03/2024    CALCIUM 9.4 05/03/2024       Magnesium:    Lab Results   Component Value Date     MG 1.99 05/03/2024       Lipid Profile:    Lab Results   Component Value Date    CHOL 111 05/03/2024    TRIG 59 05/03/2024    HDL 36.0 05/03/2024       Hepatic Function Panel:    Lab Results   Component Value Date    ALKPHOS 66 05/03/2024    ALT 21 05/03/2024    AST 19 05/03/2024    PROT 6.8 05/03/2024    BILITOT 0.5 05/03/2024    BILIDIR 0.1 05/03/2024       TSH:    Lab Results   Component Value Date    TSH 1.94 05/03/2024       HgBA1c:    Lab Results   Component Value Date    HGBA1C 4.9 05/03/2024                   PROBLEM LIST:     Patient Active Problem List   Diagnosis    Adenomatous polyp of colon    Anxiety    ASHD (arteriosclerotic heart disease)    CAD (coronary artery disease) of artery bypass graft    Benign essential hypertension    Carpal tunnel syndrome    COPD (chronic obstructive pulmonary disease) (Multi)    Current smoker on some days    Dyshidrotic eczema    Herniated nucleus pulposus, lumbar    Hyperglycemia    Hyperlipidemia    Ischemic cardiomyopathy    Keratosis    Local neurodermatitis    Past myocardial infarction    Peripheral vascular disease (CMS-[facility])    S/P CABG (coronary artery bypass graft)    Sleep apnea    Spinal stenosis, lumbar region with neurogenic claudication    Varicose veins of both lower extremities             Louie Aranda MD, FACC   [facility] / NO /  Cardiology      Of Note:  [employer] voice recognition dictation software was utilized partially in the preparation of this note, therefore, inaccuracies in spelling, word choice and punctuation may have occurred which were not recognized the time of signing.    Patient was seen and examined with total time of visit including chart preparation, rooming, and chart completion exceeding 40 minutes.      ----

## 2024-06-28 DIAGNOSIS — G47.00 INSOMNIA, UNSPECIFIED TYPE: ICD-10-CM

## 2024-06-28 RX ORDER — TRAZODONE HYDROCHLORIDE 50 MG/1
50 TABLET ORAL NIGHTLY
Qty: 90 TABLET | Refills: 3 | Status: SHIPPED | OUTPATIENT
Start: 2024-06-28

## 2024-06-28 NOTE — TELEPHONE ENCOUNTER
Rx request   Requested Prescriptions     Pending Prescriptions Disp Refills    traZODone (DESYREL) 50 MG tablet 90 tablet 3     Sig: Take 1 tablet by mouth nightly     LOV 4/1/2024  Next Visit Date:  Future Appointments   Date Time Provider Department Center   4/1/2025  9:00 AM Desiree Nielsen PA-C [facility]

## 2024-10-24 ENCOUNTER — CLINICAL DOCUMENTATION (OUTPATIENT)
Dept: SPIRITUAL SERVICES | Age: 74
End: 2024-10-24

## 2024-10-24 NOTE — ACP (ADVANCE CARE PLANNING)
Advance Care Planning     Advance Care Planning Clinical Specialist  Conversation Note      Date of ACP Conversation: 10/24/2024    Conversation Conducted with: Patient with Decision Making Capacity    ACP Clinical Specialist: LAURA Merlos  Healthcare Decision Maker:     Current Designated Healthcare Decision Maker:     Primary Decision Maker: Rita Arndt - Spouse - [phone]    Secondary Decision Maker: Lata Esparza - Child - [phone]  Click here to complete Healthcare Decision Makers including section of the Healthcare Decision Maker Relationship (ie \"Primary\")  Today we completed the ACP conversation, nomination of HCDM's and completed pt's AD documents via [employer].     Care Preferences    Hospitalization:  \"If your health worsens and it becomes clear that your chance of recovery is unlikely, what would your preference be regarding hospitalization?\"    Choice:  [x] The patient wants hospitalization.  [] The patient prefers comfort-focused treatment without hospitalization.    Ventilation:  \"If you were in your present state of health and suddenly became very ill and were unable to breathe on your own, what would your preference be about the use of a ventilator (breathing machine) if it were available to you?\"      If the patient would desire the use of ventilator (breathing machine), answer \"yes\".  If not, \"no\": yes    \"If your health worsens and it becomes clear that your chance of recovery is unlikely, what would your preference be about the use of a ventilator (breathing machine) if it were available to you?\"     Would the patient desire the use of ventilator (breathing machine)?: No      Resuscitation  \"CPR works best to restart the heart when there is a sudden event, like a heart attack, in someone who is otherwise healthy. Unfortunately, CPR does not typically restart the heart for people who have serious health conditions or who are very sick.\"    \"In the event your heart stopped as a result

## 2025-05-01 LAB
ALBUMIN SERPL-MCNC: 4.3 G/DL (ref 3.6–5.1)
ALBUMIN/GLOB SERPL: 2 (CALC) (ref 1–2.5)
ALP SERPL-CCNC: 57 U/L (ref 35–144)
ALT SERPL-CCNC: 24 U/L (ref 9–46)
AST SERPL-CCNC: 23 U/L (ref 10–35)
BILIRUB DIRECT SERPL-MCNC: 0.1 MG/DL
BILIRUB INDIRECT SERPL-MCNC: 0.3 MG/DL (CALC) (ref 0.2–1.2)
BILIRUB SERPL-MCNC: 0.4 MG/DL (ref 0.2–1.2)
GLOBULIN SER CALC-MCNC: 2.2 G/DL (CALC) (ref 1.9–3.7)
PROT SERPL-MCNC: 6.5 G/DL (ref 6.1–8.1)

## 2025-05-06 ENCOUNTER — OFFICE VISIT (OUTPATIENT)
Dept: CARDIOLOGY | Facility: CLINIC | Age: 75
End: 2025-05-06
Payer: MEDICARE

## 2025-05-06 VITALS
DIASTOLIC BLOOD PRESSURE: 66 MMHG | SYSTOLIC BLOOD PRESSURE: 120 MMHG | HEIGHT: 67 IN | HEART RATE: 72 BPM | WEIGHT: 198.4 LBS | BODY MASS INDEX: 31.14 KG/M2

## 2025-05-06 DIAGNOSIS — I10 BENIGN ESSENTIAL HYPERTENSION: ICD-10-CM

## 2025-05-06 DIAGNOSIS — G56.03 BILATERAL CARPAL TUNNEL SYNDROME: ICD-10-CM

## 2025-05-06 DIAGNOSIS — R73.9 HYPERGLYCEMIA: ICD-10-CM

## 2025-05-06 DIAGNOSIS — I25.10 ASHD (ARTERIOSCLEROTIC HEART DISEASE): ICD-10-CM

## 2025-05-06 DIAGNOSIS — E78.2 MIXED HYPERLIPIDEMIA: ICD-10-CM

## 2025-05-06 DIAGNOSIS — F17.200 CURRENT SMOKER ON SOME DAYS: ICD-10-CM

## 2025-05-06 DIAGNOSIS — I25.810 CORONARY ARTERY DISEASE INVOLVING CORONARY BYPASS GRAFT OF NATIVE HEART WITHOUT ANGINA PECTORIS: ICD-10-CM

## 2025-05-06 DIAGNOSIS — I25.2 PAST MYOCARDIAL INFARCTION: ICD-10-CM

## 2025-05-06 DIAGNOSIS — Z95.1 S/P CABG (CORONARY ARTERY BYPASS GRAFT): ICD-10-CM

## 2025-05-06 DIAGNOSIS — G47.33 OBSTRUCTIVE SLEEP APNEA SYNDROME: ICD-10-CM

## 2025-05-06 DIAGNOSIS — G47.33 OBSTRUCTIVE SLEEP APNEA: ICD-10-CM

## 2025-05-06 DIAGNOSIS — I25.5 ISCHEMIC CARDIOMYOPATHY: ICD-10-CM

## 2025-05-06 DIAGNOSIS — J44.9 CHRONIC OBSTRUCTIVE PULMONARY DISEASE, UNSPECIFIED COPD TYPE (MULTI): ICD-10-CM

## 2025-05-06 PROCEDURE — 1159F MED LIST DOCD IN RCRD: CPT | Performed by: INTERNAL MEDICINE

## 2025-05-06 PROCEDURE — 99214 OFFICE O/P EST MOD 30 MIN: CPT | Performed by: INTERNAL MEDICINE

## 2025-05-06 PROCEDURE — 3008F BODY MASS INDEX DOCD: CPT | Performed by: INTERNAL MEDICINE

## 2025-05-06 PROCEDURE — 3078F DIAST BP <80 MM HG: CPT | Performed by: INTERNAL MEDICINE

## 2025-05-06 PROCEDURE — 3074F SYST BP LT 130 MM HG: CPT | Performed by: INTERNAL MEDICINE

## 2025-05-06 RX ORDER — ATORVASTATIN CALCIUM 80 MG/1
80 TABLET, FILM COATED ORAL DAILY
Qty: 90 TABLET | Refills: 3 | Status: SHIPPED | OUTPATIENT
Start: 2025-05-06 | End: 2026-05-06

## 2025-05-06 RX ORDER — LISINOPRIL 40 MG/1
40 TABLET ORAL DAILY
Qty: 90 TABLET | Refills: 3 | Status: SHIPPED | OUTPATIENT
Start: 2025-05-06 | End: 2026-05-06

## 2025-05-06 RX ORDER — SPIRONOLACTONE 25 MG/1
25 TABLET ORAL DAILY
Qty: 90 TABLET | Refills: 3 | Status: SHIPPED | OUTPATIENT
Start: 2025-05-06 | End: 2026-05-06

## 2025-05-06 RX ORDER — METOPROLOL SUCCINATE 100 MG/1
100 TABLET, EXTENDED RELEASE ORAL DAILY
Qty: 90 TABLET | Refills: 3 | Status: SHIPPED | OUTPATIENT
Start: 2025-05-06 | End: 2026-05-06

## 2025-05-06 NOTE — PATIENT INSTRUCTIONS
Dr. Aranda would like you to watch your diet, exercise and hydrate   FASTING LABS PRIOR TO NEXT OFFICE VISIT   FOLLOW UP IN 1 YEAR      DID YOU KNOW  We have a pharmacy here in the [facility].  They can fill all prescriptions, not just cardiac medications.  Prescriptions from other pharmacies can easily be transferred to the  pharmacy by the  pharmacist on site.   pharmacies offer FREE HOME DELIVERY on medications to anywhere in [place]. They can sync your medications. Typically prescriptions can be ready in 10 - 15 minutes. If pharmacy is unable to fill your  prescription or if cost is more than your paying now the Pharmacist can easily transfer back to your Pharmacy of choice. Pharmacy phone # [phone].      Please bring all medicines, vitamins, and herbal supplements with you in original bottles to every appointment  Prescriptions will not be filled unless you are compliant with your follow up appointments or have a follow up appointment scheduled as per instruction of your physician.   Refills should be requested at the time of your visit.

## 2025-05-06 NOTE — PROGRESS NOTES
CARDIOLOGY OFFICE NOTE     Date:   5/6/2025    Patient:    Gorge Ruvalcaba    YOB: 1950    Primary Physician: Jerica Wahl PA-C         REASON FOR VISIT / CHIEF COMPLAINT:     1 year follow-up.    HPI:     Gorge Ruvalcaba was seen in cardiac evaluation at the [facility] office May 6, 2025.      The patients problems are listed as in the impression below.    Electronic medical records reviewed.    Patient returns.  He has a history of prior coronary bypass surgery with negative Myoview stress test 2023.  Ischemic cardiomyopathy ejection fraction 45 to 50%.  Hypertension.  Hyperlipidemia and hyperglycemia.    He states that he is very active.  He has no cardiac symptomatology.    He does at times have some right great toe discomfort which does not sound to be claudication related.    Patient denies Chest Pain, SOB, Lightheadedness, Dizziness, TIA or CVA symptoms.  No CHF or Edema.  No Palpitations.  No GI,  or Bleeding Issues. No Recent Fever or Chills.     Cardiovascular and general review of systems is otherwise negative.    A 14-system review is otherwise negative, other than noted.     PHYSICAL EXAMINATION:      Vitals:    05/06/25 0859   BP: 120/66   Pulse: 72     General: No acute distress. Vital signs as noted. Alert and oriented.  Head And Neck Examination: No jugular venous distention, no carotid bruits, no mass. Carotid upstrokes preserved. Oral mucosa moist.  No xanthelasma. Head and neck examination otherwise unremarkable.  Lungs: Clear to auscultation and percussion. No wheezes, no rales,  and no rhonchi.  Chest: Excursion appeared to be normal. No chest wall tenderness on palpation.  Heart: Normal S1 and S2. No S3. No S4. No rub. Grade 1/6 systolic murmur, best heard at the left sternal border. Point of maximal impulse was within normal limits.  Abdomen: Soft. Nontender. No organomegaly. No bruits. No masses. Obese.  Extremities: No bipedal edema. No clubbing. No  cyanosis.  Pulses are strong throughout. No bruits.  Musculoskeletal Exam: No ulcers, otherwise unremarkable.  Neuro: Neurologically appeared grossly intact.  .  IMPRESSION:       Cardiovascular status stable  Asymptomatic  Coronary artery disease history prior coronary bypass surgery  Negative Myoview perfusion study for ischemia 8/23  Ischemic cardiomyopathy, LVEF 45-50%, Cardiac MR 10/23 EF 51% with basal severe Hypokinesis.  Diastolic dysfunction  Prior myocardial infarction  Abnormal EKG  Hypertension  Hyperlipidemia  Hyperglycemia  Peripheral vascular disease  Lower extremity varicose veins, symptomatic.  COPD  Obstructive sleep apnea on CPAP.  Obesity  Former smoker  Otherwise as per assessment below      RECOMMENDATIONS:      Patient continues to do well.  He has no symptoms from a cardiovascular standpoint.    Would suggest continuing current medications.  Refills were provided.    Exercise dietary program.    Hydration.    [employer] portal use was encouraged.    We will plan to see back in 1 year with Laboratory Studies and ECG as ordered.     Patient will follow up with their primary physician for general care.    The patient knows to contact medical care earlier if need be.      ALLERGIES:     Patient has no known allergies.     MEDICATIONS:     Current Outpatient Medications   Medication Instructions    aspirin 81 mg EC tablet 1 tablet, Daily    atorvastatin (LIPITOR) 80 mg, oral, Daily    lisinopril 40 mg, oral, Daily    loratadine (Claritin) 10 mg tablet 1 tablet, Daily    metoprolol succinate XL (TOPROL-XL) 100 mg, oral, Daily    spironolactone (ALDACTONE) 25 mg, oral, Daily    traZODone (DESYREL) 50 mg, Nightly       ELECTROCARDIOGRAM:      None this visit    CARDIAC TESTING:      None this visit    LABORATORY DATA:      CBC:   Lab Results   Component Value Date    WBC 10.1 05/03/2024    RBC 4.28 (L) 05/03/2024    HGB 13.9 05/03/2024    HCT 41.2 05/03/2024     05/03/2024        CMP:    Lab  Results   Component Value Date     05/03/2024    K 4.7 05/03/2024     05/03/2024    CO2 25 05/03/2024    BUN 18 05/03/2024    CREATININE 1.02 05/03/2024    GLUCOSE 102 (H) 05/03/2024    CALCIUM 9.4 05/03/2024       Magnesium:    Lab Results   Component Value Date    MG 1.99 05/03/2024       Lipid Profile:    Lab Results   Component Value Date    CHOL 111 05/03/2024    TRIG 59 05/03/2024    HDL 36.0 05/03/2024    LDLCALC 63 05/03/2024       Hepatic Function Panel:    Lab Results   Component Value Date    ALKPHOS 57 05/01/2025    ALT 24 05/01/2025    AST 23 05/01/2025    PROT 6.5 05/01/2025    BILITOT 0.4 05/01/2025    BILIDIR 0.1 05/01/2025       TSH:    Lab Results   Component Value Date    TSH 1.94 05/03/2024       HgBA1c:    Lab Results   Component Value Date    HGBA1C 4.9 05/03/2024             PROBLEM LIST:     Problem List[1]          Louie Aranda MD, FACC   [facility] /  Cardiology      Of Note:  [employer] voice recognition dictation software was utilized partially in the preparation of this note, therefore, inaccuracies in spelling, word choice and punctuation may have occurred which were not recognized at the time of signing.    Patient was seen and examined with total time of visit including chart preparation, rooming, and chart completion exceeding 40 minutes.      I, Alda Meredith am scribing for, and in the presence of Dr. Louie Aranda MD, FACC.    I, Dr. Louie Aranda MD, FACC, personally performed the services described in the documentation as scribed by Alda Meredith in my presence, and confirm it is both accurate and complete.            [1]   Patient Active Problem List  Diagnosis    Adenomatous polyp of colon    Anxiety    ASHD (arteriosclerotic heart disease)    CAD (coronary artery disease) of artery bypass graft    Benign essential hypertension    Carpal tunnel syndrome    COPD (chronic obstructive pulmonary disease) (Multi)    Current smoker on some days     Dyshidrotic eczema    Herniated nucleus pulposus, lumbar    Hyperglycemia    Hyperlipidemia    Ischemic cardiomyopathy    Keratosis    Local neurodermatitis    Past myocardial infarction    Peripheral vascular disease (CMS-[facility])    S/P CABG (coronary artery bypass graft)    Sleep apnea    Spinal stenosis, lumbar region with neurogenic claudication    Varicose veins of both lower extremities    Obstructive sleep apnea

## 2025-05-09 ENCOUNTER — APPOINTMENT (OUTPATIENT)
Dept: CARDIOLOGY | Facility: CLINIC | Age: 75
End: 2025-05-09
Payer: MEDICARE

## 2025-06-19 DIAGNOSIS — G47.00 INSOMNIA, UNSPECIFIED TYPE: ICD-10-CM

## 2025-06-20 RX ORDER — TRAZODONE HYDROCHLORIDE 50 MG/1
50 TABLET ORAL NIGHTLY
Qty: 90 TABLET | Refills: 3 | OUTPATIENT
Start: 2025-06-20

## 2025-06-23 DIAGNOSIS — G47.00 INSOMNIA, UNSPECIFIED TYPE: ICD-10-CM

## 2025-06-24 RX ORDER — TRAZODONE HYDROCHLORIDE 50 MG/1
50 TABLET ORAL NIGHTLY
Qty: 90 TABLET | Refills: 3 | OUTPATIENT
Start: 2025-06-24

## 2025-06-25 ENCOUNTER — OFFICE VISIT (OUTPATIENT)
Age: 75
End: 2025-06-25
Payer: MEDICARE

## 2025-06-25 ENCOUNTER — RESULTS FOLLOW-UP (OUTPATIENT)
Age: 75
End: 2025-06-25

## 2025-06-25 VITALS
SYSTOLIC BLOOD PRESSURE: 110 MMHG | DIASTOLIC BLOOD PRESSURE: 64 MMHG | BODY MASS INDEX: 30.76 KG/M2 | HEIGHT: 67 IN | HEART RATE: 66 BPM | OXYGEN SATURATION: 99 % | TEMPERATURE: 97.6 F | WEIGHT: 196 LBS

## 2025-06-25 DIAGNOSIS — Z86.0101 H/O ADENOMATOUS POLYP OF COLON: ICD-10-CM

## 2025-06-25 DIAGNOSIS — Z12.5 PROSTATE CANCER SCREENING: ICD-10-CM

## 2025-06-25 DIAGNOSIS — G47.00 INSOMNIA, UNSPECIFIED TYPE: ICD-10-CM

## 2025-06-25 DIAGNOSIS — I25.10 CORONARY ARTERY DISEASE INVOLVING NATIVE CORONARY ARTERY OF NATIVE HEART WITHOUT ANGINA PECTORIS: Primary | ICD-10-CM

## 2025-06-25 DIAGNOSIS — I25.10 CORONARY ARTERY DISEASE INVOLVING NATIVE CORONARY ARTERY OF NATIVE HEART WITHOUT ANGINA PECTORIS: ICD-10-CM

## 2025-06-25 LAB
ANION GAP SERPL CALCULATED.3IONS-SCNC: 13 MEQ/L (ref 9–15)
BASOPHILS # BLD: 0.1 K/UL (ref 0–0.2)
BASOPHILS NFR BLD: 0.5 %
BUN SERPL-MCNC: 15 MG/DL (ref 8–23)
CALCIUM SERPL-MCNC: 9.6 MG/DL (ref 8.5–9.9)
CHLORIDE SERPL-SCNC: 103 MEQ/L (ref 95–107)
CHOLEST SERPL-MCNC: 99 MG/DL (ref 0–199)
CO2 SERPL-SCNC: 22 MEQ/L (ref 20–31)
CREAT SERPL-MCNC: 1.01 MG/DL (ref 0.7–1.2)
EOSINOPHIL # BLD: 0.3 K/UL (ref 0–0.7)
EOSINOPHIL NFR BLD: 3.7 %
ERYTHROCYTE [DISTWIDTH] IN BLOOD BY AUTOMATED COUNT: 12 % (ref 11.5–14.5)
GLUCOSE SERPL-MCNC: 102 MG/DL (ref 70–99)
HCT VFR BLD AUTO: 38.9 % (ref 42–52)
HDLC SERPL-MCNC: 32 MG/DL (ref 40–59)
HGB BLD-MCNC: 13.2 G/DL (ref 14–18)
LDL CHOLESTEROL: 49 MG/DL (ref 0–129)
LYMPHOCYTES # BLD: 1.4 K/UL (ref 1–4.8)
LYMPHOCYTES NFR BLD: 15.1 %
MCH RBC QN AUTO: 31.6 PG (ref 27–31.3)
MCHC RBC AUTO-ENTMCNC: 33.9 % (ref 33–37)
MCV RBC AUTO: 93.1 FL (ref 79–92.2)
MONOCYTES # BLD: 0.8 K/UL (ref 0.2–0.8)
MONOCYTES NFR BLD: 8.7 %
NEUTROPHILS # BLD: 6.5 K/UL (ref 1.4–6.5)
NEUTS SEG NFR BLD: 71.6 %
PLATELET # BLD AUTO: 398 K/UL (ref 130–400)
POTASSIUM SERPL-SCNC: 4.7 MEQ/L (ref 3.4–4.9)
PSA SERPL-MCNC: 0.72 NG/ML (ref 0–4)
RBC # BLD AUTO: 4.18 M/UL (ref 4.7–6.1)
SODIUM SERPL-SCNC: 138 MEQ/L (ref 135–144)
TRIGLYCERIDE, FASTING: 89 MG/DL (ref 0–150)
WBC # BLD AUTO: 9.1 K/UL (ref 4.8–10.8)

## 2025-06-25 PROCEDURE — 3017F COLORECTAL CA SCREEN DOC REV: CPT | Performed by: PHYSICIAN ASSISTANT

## 2025-06-25 PROCEDURE — 99214 OFFICE O/P EST MOD 30 MIN: CPT | Performed by: PHYSICIAN ASSISTANT

## 2025-06-25 PROCEDURE — 3074F SYST BP LT 130 MM HG: CPT | Performed by: PHYSICIAN ASSISTANT

## 2025-06-25 PROCEDURE — 3078F DIAST BP <80 MM HG: CPT | Performed by: PHYSICIAN ASSISTANT

## 2025-06-25 PROCEDURE — G8427 DOCREV CUR MEDS BY ELIG CLIN: HCPCS | Performed by: PHYSICIAN ASSISTANT

## 2025-06-25 PROCEDURE — 1159F MED LIST DOCD IN RCRD: CPT | Performed by: PHYSICIAN ASSISTANT

## 2025-06-25 PROCEDURE — 4004F PT TOBACCO SCREEN RCVD TLK: CPT | Performed by: PHYSICIAN ASSISTANT

## 2025-06-25 PROCEDURE — G8417 CALC BMI ABV UP PARAM F/U: HCPCS | Performed by: PHYSICIAN ASSISTANT

## 2025-06-25 PROCEDURE — 1123F ACP DISCUSS/DSCN MKR DOCD: CPT | Performed by: PHYSICIAN ASSISTANT

## 2025-06-25 PROCEDURE — 99213 OFFICE O/P EST LOW 20 MIN: CPT | Performed by: PHYSICIAN ASSISTANT

## 2025-06-25 RX ORDER — TRAZODONE HYDROCHLORIDE 50 MG/1
50 TABLET ORAL NIGHTLY
Qty: 30 TABLET | Refills: 0 | Status: SHIPPED | OUTPATIENT
Start: 2025-06-25

## 2025-06-25 RX ORDER — TRAZODONE HYDROCHLORIDE 50 MG/1
50 TABLET ORAL NIGHTLY
Qty: 90 TABLET | Refills: 3 | Status: SHIPPED | OUTPATIENT
Start: 2025-06-25 | End: 2025-06-25 | Stop reason: SDUPTHER

## 2025-06-25 SDOH — ECONOMIC STABILITY: FOOD INSECURITY: WITHIN THE PAST 12 MONTHS, THE FOOD YOU BOUGHT JUST DIDN'T LAST AND YOU DIDN'T HAVE MONEY TO GET MORE.: NEVER TRUE

## 2025-06-25 SDOH — ECONOMIC STABILITY: FOOD INSECURITY: WITHIN THE PAST 12 MONTHS, YOU WORRIED THAT YOUR FOOD WOULD RUN OUT BEFORE YOU GOT MONEY TO BUY MORE.: NEVER TRUE

## 2025-06-25 ASSESSMENT — PATIENT HEALTH QUESTIONNAIRE - PHQ9
SUM OF ALL RESPONSES TO PHQ QUESTIONS 1-9: 0
2. FEELING DOWN, DEPRESSED OR HOPELESS: NOT AT ALL
SUM OF ALL RESPONSES TO PHQ QUESTIONS 1-9: 0
1. LITTLE INTEREST OR PLEASURE IN DOING THINGS: NOT AT ALL

## 2025-06-25 NOTE — PROGRESS NOTES
Subjective  Fredy Youssef, 75 y.o. male presents today with:  Chief Complaint   Patient presents with    1 Year Follow Up     Patient presets today for a yearly check up.        HPI      Review of Systems      Past Medical History:   Diagnosis Date    CAD (coronary artery disease)     had CABG    HIGH CHOLESTEROL     Hypercholesterolemia     Hyperlipidemia     Hypertension     Keratosis     Neuropathy     Psoriasis     Sleep apnea      Past Surgical History:   Procedure Laterality Date    CARDIAC SURGERY      CARPAL TUNNEL RELEASE Left     CATARACT EXTRACTION      2023    COLONOSCOPY  09/10/2012    dr degn /2 polyps, diverticulosis, int hemmorrhoids    COLONOSCOPY N/A 2021    COLORECTAL CANCER SCREENING, HIGH RISK performed by Chapis Deng MD at [facility]    CORONARY ARTERY BYPASS GRAFT  2012    CABG X 3    LAMINECTOMY Bilateral 2021    BILATERAL L 3-4 MICRODECOMPRESSION performed by Luh Ramsey MD at [facility]    [place] NEUROPLASTY &/TRANSPOS MEDIAN NRV CARPAL TUNNE Right 10/30/2018    RIGHT WRIST CARPAL TUNNEL RELEASE SUPINE performed by Christopher Alaniz MD at [facility] OR    TONSILLECTOMY      VASECTOMY           Social History     Socioeconomic History    Marital status:      Spouse name: Not on file    Number of children: Not on file    Years of education: Not on file    Highest education level: Not on file   Occupational History    Not on file   Tobacco Use    Smoking status: Light Smoker     Current packs/day: 0.00     Average packs/day: 0.5 packs/day for 20.0 years (10.0 ttl pk-yrs)     Types: Cigarettes     Start date: 1992     Last attempt to quit: 2012     Years since quittin.4     Passive exposure: Never    Smokeless tobacco: Never    Tobacco comments:     mentions only when he is drinking    Vaping Use    Vaping status: Never Used   Substance and Sexual Activity    Alcohol use: Yes     Comment: social     Drug use: No    Sexual activity: Not Currently

## 2025-08-27 DIAGNOSIS — G47.00 INSOMNIA, UNSPECIFIED TYPE: ICD-10-CM

## 2025-08-27 RX ORDER — TRAZODONE HYDROCHLORIDE 50 MG/1
50 TABLET ORAL NIGHTLY
Qty: 14 TABLET | Refills: 0 | Status: SHIPPED | OUTPATIENT
Start: 2025-08-27 | End: 2025-09-10

## 2025-08-27 RX ORDER — TRAZODONE HYDROCHLORIDE 50 MG/1
50 TABLET ORAL NIGHTLY
Qty: 90 TABLET | Refills: 0 | Status: SHIPPED | OUTPATIENT
Start: 2025-08-27 | End: 2025-08-27 | Stop reason: SDUPTHER

## 2025-08-27 RX ORDER — TRAZODONE HYDROCHLORIDE 50 MG/1
50 TABLET ORAL NIGHTLY
Qty: 90 TABLET | Refills: 3 | Status: SHIPPED | OUTPATIENT
Start: 2025-08-27 | End: 2025-11-25

## 2026-05-05 ENCOUNTER — APPOINTMENT (OUTPATIENT)
Dept: CARDIOLOGY | Facility: CLINIC | Age: 76
End: 2026-05-05
Payer: MEDICARE

## (undated) DEVICE — 3M™ IOBAN™ 2 ANTIMICROBIAL INCISE DRAPE 6650EZ: Brand: IOBAN™ 2

## (undated) DEVICE — APPLICATOR MEDICATED 10.5 CC SOLUTION HI LT ORNG CHLORAPREP

## (undated) DEVICE — INTENDED FOR TISSUE SEPARATION, AND OTHER PROCEDURES THAT REQUIRE A SHARP SURGICAL BLADE TO PUNCTURE OR CUT.: Brand: BARD-PARKER ® CARBON RIB-BACK BLADES

## (undated) DEVICE — CONTEX LF ST 3X5 10/CS: Brand: CONTEX LF ST 3X5 10/CS

## (undated) DEVICE — FORCEPS ENDOSCP BX OVL CUP SERR W/NEEDLE 2.3MM DIA 160CML

## (undated) DEVICE — MARKER SURG SKIN GENTIAN VLT REG TIP W/ 6IN RUL

## (undated) DEVICE — SINGLE PORT MANIFOLD: Brand: NEPTUNE 2

## (undated) DEVICE — GLOVE ORANGE PI 7 1/2   MSG9075

## (undated) DEVICE — Device: Brand: ENDO SMARTCAP

## (undated) DEVICE — GOWN,AURORA,NONREINFORCED,LARGE: Brand: MEDLINE

## (undated) DEVICE — SYRINGE MED 30ML STD CLR PLAS LUERLOCK TIP N CTRL DISP

## (undated) DEVICE — SYRINGE MED 10ML LUERLOCK TIP W/O SFTY DISP

## (undated) DEVICE — CORD,CAUTERY,BIPOLAR,STERILE: Brand: MEDLINE

## (undated) DEVICE — GAUZE,SPONGE,4"X4",16PLY,XRAY,STRL,LF: Brand: MEDLINE

## (undated) DEVICE — GAUZE,SPONGE,4"X4",12PLY,STERILE,LF,2'S: Brand: MEDLINE

## (undated) DEVICE — SNARE ENDOSCP AD L240CM LOOP W10MM SHTH DIA2.4MM RND INSUL

## (undated) DEVICE — GAUZE,SPONGE,2"X2",8PLY,STERILE,LF,2'S: Brand: MEDLINE

## (undated) DEVICE — 1010 S-DRAPE TOWEL DRAPE 10/BX: Brand: STERI-DRAPE™

## (undated) DEVICE — 3.0MM PRECISION NEURO (MATCH HEAD)

## (undated) DEVICE — COUNTER NDL 40 COUNT HLD 70 FOAM BLK ADH W/ MAG

## (undated) DEVICE — CODMAN® SURGICAL PATTIES 1/2" X 1/2" (1.27CM X 1.27CM): Brand: CODMAN®

## (undated) DEVICE — SHEET,DRAPE,53X77,STERILE: Brand: MEDLINE

## (undated) DEVICE — COVER LT HNDL BLU PLAS

## (undated) DEVICE — BANDAGE COMPR W4INXL9FT BLU 1 LAYR SYNTH POLYMER ESMARCH

## (undated) DEVICE — WAX SURG 2.5GM HEMSTAT BNE BEESWAX PARAFFIN ISO PALMITATE

## (undated) DEVICE — GLOVE ORANGE PI 8   MSG9080

## (undated) DEVICE — GLOVE SURG SZ 75 L12IN FNGR THK94MIL STD WHT LTX FREE

## (undated) DEVICE — NEEDLE HYPO 22GA L1 1/2IN PIVOTING SHLD FOR LUERLOCK SYR

## (undated) DEVICE — KIT EVAC 400CC PVC RADPQ Y CONN

## (undated) DEVICE — GLOVE ORANGE PI 8 1/2   MSG9085

## (undated) DEVICE — ALCON SURGICAL BLADE 64: Brand: ALCON

## (undated) DEVICE — GLOVE ORTHO 8   MSG9480

## (undated) DEVICE — C-ARM: Brand: UNBRANDED

## (undated) DEVICE — ELECTRODE PT RET AD L9FT HI MOIST COND ADH HYDRGEL CORDED

## (undated) DEVICE — TUBE SET 96 MM 64 MM H2O PERISTALTIC STD AUX CHANNEL

## (undated) DEVICE — 3M™ STERI-DRAPE™ INSTRUMENT POUCH 1018: Brand: STERI-DRAPE™

## (undated) DEVICE — SLING ARM M L15 1/2IN D8IN COT POLY DLX W/ SHLDR PD

## (undated) DEVICE — UNDERCAST PADDING: Brand: DEROYAL

## (undated) DEVICE — TRAP POLYP BALEEN

## (undated) DEVICE — BRUSH ENDO CLN L90.5IN SHTH DIA1.7MM BRIST DIA5-7MM 2-6MM

## (undated) DEVICE — DRAPE EQUIP TRNSPRT CONTAINMENT FOR BK TAB

## (undated) DEVICE — SUTURE ETHLN SZ 4-0 L18IN NONABSORBABLE BLK L19MM PS-2 3/8 1667H

## (undated) DEVICE — HYPODERMIC SAFETY NEEDLE: Brand: MAGELLAN

## (undated) DEVICE — TUBING, SUCTION, 1/4" X 10', STRAIGHT: Brand: MEDLINE

## (undated) DEVICE — SUTURE VCRL SZ 3-0 L18IN ABSRB UD PS-2 L19MM 3/8 CRV PRIM J497H

## (undated) DEVICE — TUBING, SUCTION, 9/32" X 20', STRAIGHT: Brand: MEDLINE INDUSTRIES, INC.

## (undated) DEVICE — ENDO CARRY-ON PROCEDURE KIT: Brand: ENDO CARRY-ON PROCEDURE KIT

## (undated) DEVICE — DRAPE SURG EXT FEN REINF ST W O FLD PCH STD

## (undated) DEVICE — CATHETER IV 16 GAX2 IN STR INTROCAN SAFETY

## (undated) DEVICE — 3M™ TEGADERM™ TRANSPARENT FILM DRESSING FRAME STYLE, 1626W, 4 IN X 4-3/4 IN (10 CM X 12 CM), 50/CT 4CT/CASE: Brand: 3M™ TEGADERM™

## (undated) DEVICE — NEPTUNE E-SEP SMOKE EVACUATION PENCIL, COATED, 70MM BLADE, PUSH BUTTON SWITCH: Brand: NEPTUNE E-SEP

## (undated) DEVICE — LABEL MED MINI W/ MARKER

## (undated) DEVICE — APPLICATOR MEDICATED 26 CC SOLUTION HI LT ORNG CHLORAPREP

## (undated) DEVICE — KAIRISON TUBING SET PNEUMATIC, (3000 MM), STERILE, DISPOSABLE, TO BE USED WITH: FK898R, PACKAGE OF 10 PIECES: Brand: KAIRISON

## (undated) DEVICE — SUTURE VCRL SZ 2-0 L27IN ABSRB UD L36MM CP-1 1/2 CIR REV J266H

## (undated) DEVICE — SYRINGE IRRIG 60ML SFT PLIABLE BLB EZ TO GRP 1 HND USE W/

## (undated) DEVICE — 3M™ STERI-DRAPE™ U-DRAPE 1015: Brand: STERI-DRAPE™

## (undated) DEVICE — SUTURE VCRL SZ 0 L27IN ABSRB UD L26MM CP-2 1/2 CIR SGL J870H

## (undated) DEVICE — DRAPE EQUIP MICSCP 120X48 IN ANGLED GLS LENS HSNG FOR LEICA

## (undated) DEVICE — CHLORAPREP 26ML ORANGE

## (undated) DEVICE — SUTURE VCRL SZ 4-0 L18IN ABSRB UD L19MM PS-2 3/8 CIR PRIM J496H

## (undated) DEVICE — GLOVE ORANGE PI 7   MSG9070

## (undated) DEVICE — NEEDLE SPINAL 22GA L3.5IN SPINOCAN

## (undated) DEVICE — TOWEL,OR,DSP,ST,BLUE,STD,4/PK,20PK/CS: Brand: MEDLINE

## (undated) DEVICE — PACK,LAPAROTOMY,NO GOWNS: Brand: MEDLINE

## (undated) DEVICE — CODMAN® SURGICAL PATTIES 1/2" X 3" (1.27CM X 7.62CM): Brand: CODMAN®